# Patient Record
Sex: MALE | Race: WHITE | NOT HISPANIC OR LATINO | Employment: OTHER | URBAN - METROPOLITAN AREA
[De-identification: names, ages, dates, MRNs, and addresses within clinical notes are randomized per-mention and may not be internally consistent; named-entity substitution may affect disease eponyms.]

---

## 2017-04-04 ENCOUNTER — OFFICE VISIT - RIVER FALLS (OUTPATIENT)
Dept: FAMILY MEDICINE | Facility: CLINIC | Age: 72
End: 2017-04-04

## 2017-04-04 ASSESSMENT — MIFFLIN-ST. JEOR: SCORE: 1590.13

## 2017-04-05 ENCOUNTER — OFFICE VISIT - RIVER FALLS (OUTPATIENT)
Dept: FAMILY MEDICINE | Facility: CLINIC | Age: 72
End: 2017-04-05

## 2017-04-05 ASSESSMENT — MIFFLIN-ST. JEOR: SCORE: 1595.57

## 2017-06-15 ENCOUNTER — OFFICE VISIT - RIVER FALLS (OUTPATIENT)
Dept: FAMILY MEDICINE | Facility: CLINIC | Age: 72
End: 2017-06-15

## 2017-06-15 ENCOUNTER — COMMUNICATION - RIVER FALLS (OUTPATIENT)
Dept: FAMILY MEDICINE | Facility: CLINIC | Age: 72
End: 2017-06-15

## 2017-06-15 ASSESSMENT — MIFFLIN-ST. JEOR: SCORE: 1575.61

## 2017-06-16 LAB
CREAT SERPL-MCNC: 1.31 MG/DL (ref 0.7–1.18)
GLUCOSE BLD-MCNC: 104 MG/DL (ref 65–99)

## 2017-08-14 ENCOUNTER — AMBULATORY - RIVER FALLS (OUTPATIENT)
Dept: FAMILY MEDICINE | Facility: CLINIC | Age: 72
End: 2017-08-14

## 2017-08-15 LAB
CREAT SERPL-MCNC: 1.07 MG/DL (ref 0.7–1.18)
GLUCOSE BLD-MCNC: 104 MG/DL (ref 65–99)

## 2017-12-07 ENCOUNTER — OFFICE VISIT - RIVER FALLS (OUTPATIENT)
Dept: FAMILY MEDICINE | Facility: CLINIC | Age: 72
End: 2017-12-07

## 2017-12-07 ASSESSMENT — MIFFLIN-ST. JEOR: SCORE: 1588.31

## 2017-12-08 LAB
CREAT SERPL-MCNC: 1.23 MG/DL (ref 0.7–1.18)
GLUCOSE BLD-MCNC: 91 MG/DL (ref 65–99)
PSA SERPL-MCNC: 2.4 NG/ML

## 2018-03-09 ENCOUNTER — OFFICE VISIT - RIVER FALLS (OUTPATIENT)
Dept: FAMILY MEDICINE | Facility: CLINIC | Age: 73
End: 2018-03-09

## 2018-03-09 ASSESSMENT — MIFFLIN-ST. JEOR: SCORE: 1616.43

## 2018-03-29 ENCOUNTER — OFFICE VISIT - RIVER FALLS (OUTPATIENT)
Dept: FAMILY MEDICINE | Facility: CLINIC | Age: 73
End: 2018-03-29

## 2018-03-29 ASSESSMENT — MIFFLIN-ST. JEOR: SCORE: 1623.69

## 2018-04-18 ENCOUNTER — OFFICE VISIT - RIVER FALLS (OUTPATIENT)
Dept: FAMILY MEDICINE | Facility: CLINIC | Age: 73
End: 2018-04-18

## 2018-04-18 ASSESSMENT — MIFFLIN-ST. JEOR: SCORE: 1616.43

## 2018-04-19 LAB
CREAT SERPL-MCNC: 1.73 MG/DL (ref 0.7–1.18)
GLUCOSE BLD-MCNC: 83 MG/DL (ref 65–99)

## 2018-04-20 ENCOUNTER — OFFICE VISIT - RIVER FALLS (OUTPATIENT)
Dept: FAMILY MEDICINE | Facility: CLINIC | Age: 73
End: 2018-04-20

## 2018-04-20 ASSESSMENT — MIFFLIN-ST. JEOR: SCORE: 1610.99

## 2018-05-22 ENCOUNTER — OFFICE VISIT - RIVER FALLS (OUTPATIENT)
Dept: FAMILY MEDICINE | Facility: CLINIC | Age: 73
End: 2018-05-22

## 2018-05-29 ENCOUNTER — OFFICE VISIT - RIVER FALLS (OUTPATIENT)
Dept: FAMILY MEDICINE | Facility: CLINIC | Age: 73
End: 2018-05-29

## 2018-05-29 ASSESSMENT — MIFFLIN-ST. JEOR: SCORE: 1552.93

## 2018-06-07 ENCOUNTER — AMBULATORY - RIVER FALLS (OUTPATIENT)
Dept: FAMILY MEDICINE | Facility: CLINIC | Age: 73
End: 2018-06-07

## 2018-07-27 ENCOUNTER — OFFICE VISIT - RIVER FALLS (OUTPATIENT)
Dept: FAMILY MEDICINE | Facility: CLINIC | Age: 73
End: 2018-07-27

## 2018-07-27 ASSESSMENT — MIFFLIN-ST. JEOR: SCORE: 1581.96

## 2018-07-28 LAB
CHOLEST SERPL-MCNC: 113 MG/DL
CHOLEST/HDLC SERPL: 2.3 {RATIO}
CREAT SERPL-MCNC: 1.12 MG/DL (ref 0.7–1.18)
GLUCOSE BLD-MCNC: 102 MG/DL (ref 65–99)
HDLC SERPL-MCNC: 49 MG/DL
LDLC SERPL CALC-MCNC: 50 MG/DL
NONHDLC SERPL-MCNC: 64 MG/DL
TRIGL SERPL-MCNC: 65 MG/DL

## 2018-08-02 ENCOUNTER — OFFICE VISIT - RIVER FALLS (OUTPATIENT)
Dept: FAMILY MEDICINE | Facility: CLINIC | Age: 73
End: 2018-08-02

## 2018-09-04 ENCOUNTER — OFFICE VISIT - RIVER FALLS (OUTPATIENT)
Dept: FAMILY MEDICINE | Facility: CLINIC | Age: 73
End: 2018-09-04

## 2018-09-04 ASSESSMENT — MIFFLIN-ST. JEOR: SCORE: 1575.61

## 2018-12-14 ENCOUNTER — OFFICE VISIT - RIVER FALLS (OUTPATIENT)
Dept: FAMILY MEDICINE | Facility: CLINIC | Age: 73
End: 2018-12-14

## 2018-12-14 ASSESSMENT — MIFFLIN-ST. JEOR: SCORE: 1613.71

## 2019-01-03 ENCOUNTER — OFFICE VISIT - RIVER FALLS (OUTPATIENT)
Dept: FAMILY MEDICINE | Facility: CLINIC | Age: 74
End: 2019-01-03

## 2019-01-03 ASSESSMENT — MIFFLIN-ST. JEOR: SCORE: 1620.06

## 2019-01-25 ENCOUNTER — OFFICE VISIT - RIVER FALLS (OUTPATIENT)
Dept: FAMILY MEDICINE | Facility: CLINIC | Age: 74
End: 2019-01-25

## 2019-01-25 ASSESSMENT — MIFFLIN-ST. JEOR: SCORE: 1601.01

## 2019-02-11 ENCOUNTER — OFFICE VISIT - RIVER FALLS (OUTPATIENT)
Dept: FAMILY MEDICINE | Facility: CLINIC | Age: 74
End: 2019-02-11

## 2019-05-21 ENCOUNTER — OFFICE VISIT - RIVER FALLS (OUTPATIENT)
Dept: FAMILY MEDICINE | Facility: CLINIC | Age: 74
End: 2019-05-21

## 2019-06-11 ENCOUNTER — OFFICE VISIT - RIVER FALLS (OUTPATIENT)
Dept: FAMILY MEDICINE | Facility: CLINIC | Age: 74
End: 2019-06-11

## 2019-07-25 ENCOUNTER — OFFICE VISIT - RIVER FALLS (OUTPATIENT)
Dept: FAMILY MEDICINE | Facility: CLINIC | Age: 74
End: 2019-07-25

## 2019-07-25 ASSESSMENT — MIFFLIN-ST. JEOR: SCORE: 1603.73

## 2019-07-26 ENCOUNTER — AMBULATORY - RIVER FALLS (OUTPATIENT)
Dept: FAMILY MEDICINE | Facility: CLINIC | Age: 74
End: 2019-07-26

## 2019-07-26 ENCOUNTER — COMMUNICATION - RIVER FALLS (OUTPATIENT)
Dept: FAMILY MEDICINE | Facility: CLINIC | Age: 74
End: 2019-07-26

## 2019-07-26 LAB
BUN SERPL-MCNC: 27 MG/DL (ref 7–25)
BUN/CREAT RATIO - HISTORICAL: 21 (ref 6–22)
CALCIUM SERPL-MCNC: 9.7 MG/DL (ref 8.6–10.3)
CHLORIDE BLD-SCNC: 107 MMOL/L (ref 98–110)
CHOLEST SERPL-MCNC: 122 MG/DL
CHOLEST/HDLC SERPL: 2.3 {RATIO}
CO2 SERPL-SCNC: 28 MMOL/L (ref 20–32)
CREAT SERPL-MCNC: 1.28 MG/DL (ref 0.7–1.18)
EGFRCR SERPLBLD CKD-EPI 2021: 54 ML/MIN/1.73M2
ERYTHROCYTE [DISTWIDTH] IN BLOOD BY AUTOMATED COUNT: 12.7 % (ref 11–15)
GLUCOSE BLD-MCNC: 105 MG/DL (ref 65–99)
HCT VFR BLD AUTO: 46.4 % (ref 38.5–50)
HDLC SERPL-MCNC: 52 MG/DL
HEMOCCULT STL QL IA: NEGATIVE
HGB BLD-MCNC: 15.1 GM/DL (ref 13.2–17.1)
LDLC SERPL CALC-MCNC: 56 MG/DL
MCH RBC QN AUTO: 28.7 PG (ref 27–33)
MCHC RBC AUTO-ENTMCNC: 32.5 GM/DL (ref 32–36)
MCV RBC AUTO: 88 FL (ref 80–100)
NONHDLC SERPL-MCNC: 70 MG/DL
PLATELET # BLD AUTO: 265 10*3/UL (ref 140–400)
PMV BLD: 11.7 FL (ref 7.5–12.5)
POTASSIUM BLD-SCNC: 4.9 MMOL/L (ref 3.5–5.3)
RBC # BLD AUTO: 5.27 10*6/UL (ref 4.2–5.8)
SODIUM SERPL-SCNC: 142 MMOL/L (ref 135–146)
TRIGL SERPL-MCNC: 65 MG/DL
WBC # BLD AUTO: 7.2 10*3/UL (ref 3.8–10.8)

## 2019-08-29 ENCOUNTER — OFFICE VISIT - RIVER FALLS (OUTPATIENT)
Dept: FAMILY MEDICINE | Facility: CLINIC | Age: 74
End: 2019-08-29

## 2019-10-02 ENCOUNTER — OFFICE VISIT - RIVER FALLS (OUTPATIENT)
Dept: FAMILY MEDICINE | Facility: CLINIC | Age: 74
End: 2019-10-02

## 2019-10-02 ASSESSMENT — MIFFLIN-ST. JEOR: SCORE: 1560.19

## 2019-11-05 ENCOUNTER — OFFICE VISIT - RIVER FALLS (OUTPATIENT)
Dept: FAMILY MEDICINE | Facility: CLINIC | Age: 74
End: 2019-11-05

## 2020-02-04 ENCOUNTER — OFFICE VISIT - RIVER FALLS (OUTPATIENT)
Dept: FAMILY MEDICINE | Facility: CLINIC | Age: 75
End: 2020-02-04

## 2020-04-30 ENCOUNTER — OFFICE VISIT - RIVER FALLS (OUTPATIENT)
Dept: FAMILY MEDICINE | Facility: CLINIC | Age: 75
End: 2020-04-30

## 2020-05-01 ENCOUNTER — OFFICE VISIT - RIVER FALLS (OUTPATIENT)
Dept: FAMILY MEDICINE | Facility: CLINIC | Age: 75
End: 2020-05-01

## 2020-05-01 ASSESSMENT — MIFFLIN-ST. JEOR: SCORE: 1562

## 2020-05-08 ENCOUNTER — AMBULATORY - RIVER FALLS (OUTPATIENT)
Dept: FAMILY MEDICINE | Facility: CLINIC | Age: 75
End: 2020-05-08

## 2020-05-14 ENCOUNTER — AMBULATORY - RIVER FALLS (OUTPATIENT)
Dept: FAMILY MEDICINE | Facility: CLINIC | Age: 75
End: 2020-05-14

## 2020-08-28 ENCOUNTER — OFFICE VISIT - RIVER FALLS (OUTPATIENT)
Dept: FAMILY MEDICINE | Facility: CLINIC | Age: 75
End: 2020-08-28

## 2020-08-28 ENCOUNTER — TRANSFERRED RECORDS (OUTPATIENT)
Dept: MULTI SPECIALTY CLINIC | Facility: CLINIC | Age: 75
End: 2020-08-28

## 2020-08-28 ASSESSMENT — MIFFLIN-ST. JEOR: SCORE: 1566.54

## 2020-08-30 LAB
ANA SER QL IA: NEGATIVE
C REACTIVE PROTEIN LHE: 2.6 MG/L
CYCLIC CITRULLINATED PEPTIDE CCP AB IGG - QUEST: <16
RHEUMATOID FACT SER NEPH-ACNC: <14 IU/ML

## 2020-08-31 ENCOUNTER — COMMUNICATION - RIVER FALLS (OUTPATIENT)
Dept: FAMILY MEDICINE | Facility: CLINIC | Age: 75
End: 2020-08-31

## 2020-09-28 ENCOUNTER — OFFICE VISIT - RIVER FALLS (OUTPATIENT)
Dept: FAMILY MEDICINE | Facility: CLINIC | Age: 75
End: 2020-09-28

## 2020-09-28 ASSESSMENT — MIFFLIN-ST. JEOR: SCORE: 1571.07

## 2020-10-30 ENCOUNTER — OFFICE VISIT - RIVER FALLS (OUTPATIENT)
Dept: FAMILY MEDICINE | Facility: CLINIC | Age: 75
End: 2020-10-30

## 2020-10-30 ASSESSMENT — MIFFLIN-ST. JEOR: SCORE: 1562

## 2020-11-17 ENCOUNTER — OFFICE VISIT - RIVER FALLS (OUTPATIENT)
Dept: FAMILY MEDICINE | Facility: CLINIC | Age: 75
End: 2020-11-17

## 2020-11-18 ENCOUNTER — COMMUNICATION - RIVER FALLS (OUTPATIENT)
Dept: FAMILY MEDICINE | Facility: CLINIC | Age: 75
End: 2020-11-18

## 2020-11-27 ENCOUNTER — COMMUNICATION - RIVER FALLS (OUTPATIENT)
Dept: FAMILY MEDICINE | Facility: CLINIC | Age: 75
End: 2020-11-27

## 2021-03-09 ENCOUNTER — OFFICE VISIT - RIVER FALLS (OUTPATIENT)
Dept: FAMILY MEDICINE | Facility: CLINIC | Age: 76
End: 2021-03-09

## 2021-06-29 ENCOUNTER — OFFICE VISIT - RIVER FALLS (OUTPATIENT)
Dept: FAMILY MEDICINE | Facility: CLINIC | Age: 76
End: 2021-06-29

## 2021-06-29 ASSESSMENT — MIFFLIN-ST. JEOR: SCORE: 1567.68

## 2021-08-24 ENCOUNTER — OFFICE VISIT - RIVER FALLS (OUTPATIENT)
Dept: FAMILY MEDICINE | Facility: CLINIC | Age: 76
End: 2021-08-24

## 2021-12-07 ENCOUNTER — AMBULATORY - RIVER FALLS (OUTPATIENT)
Dept: FAMILY MEDICINE | Facility: CLINIC | Age: 76
End: 2021-12-07

## 2021-12-21 ENCOUNTER — OFFICE VISIT - RIVER FALLS (OUTPATIENT)
Dept: FAMILY MEDICINE | Facility: CLINIC | Age: 76
End: 2021-12-21

## 2021-12-21 ASSESSMENT — MIFFLIN-ST. JEOR: SCORE: 1560.42

## 2021-12-22 ENCOUNTER — COMMUNICATION - RIVER FALLS (OUTPATIENT)
Dept: FAMILY MEDICINE | Facility: CLINIC | Age: 76
End: 2021-12-22

## 2021-12-22 LAB
BUN SERPL-MCNC: 32 MG/DL (ref 7–25)
BUN/CREAT RATIO - HISTORICAL: 27 (ref 6–22)
CALCIUM SERPL-MCNC: 9.5 MG/DL (ref 8.6–10.3)
CHLORIDE BLD-SCNC: 105 MMOL/L (ref 98–110)
CHOLEST SERPL-MCNC: 159 MG/DL
CHOLEST/HDLC SERPL: 3.1 {RATIO}
CO2 SERPL-SCNC: 26 MMOL/L (ref 20–32)
CREAT SERPL-MCNC: 1.17 MG/DL (ref 0.7–1.18)
EGFRCR SERPLBLD CKD-EPI 2021: 59 ML/MIN/1.73M2
ERYTHROCYTE [DISTWIDTH] IN BLOOD BY AUTOMATED COUNT: 12.3 % (ref 11–15)
GLUCOSE BLD-MCNC: 103 MG/DL (ref 65–99)
HCT VFR BLD AUTO: 46 % (ref 38.5–50)
HDLC SERPL-MCNC: 52 MG/DL
HGB BLD-MCNC: 15.3 GM/DL (ref 13.2–17.1)
LDLC SERPL CALC-MCNC: 92 MG/DL
MCH RBC QN AUTO: 28.6 PG (ref 27–33)
MCHC RBC AUTO-ENTMCNC: 33.3 GM/DL (ref 32–36)
MCV RBC AUTO: 86 FL (ref 80–100)
NONHDLC SERPL-MCNC: 107 MG/DL
PLATELET # BLD AUTO: 283 10*3/UL (ref 140–400)
PMV BLD: 11.3 FL (ref 7.5–12.5)
POTASSIUM BLD-SCNC: 4.5 MMOL/L (ref 3.5–5.3)
PSA SERPL-MCNC: 1.94 NG/ML
RBC # BLD AUTO: 5.35 10*6/UL (ref 4.2–5.8)
SODIUM SERPL-SCNC: 140 MMOL/L (ref 135–146)
TRIGL SERPL-MCNC: 64 MG/DL
WBC # BLD AUTO: 7.7 10*3/UL (ref 3.8–10.8)

## 2022-02-12 VITALS
OXYGEN SATURATION: 98 % | WEIGHT: 194 LBS | WEIGHT: 195 LBS | DIASTOLIC BLOOD PRESSURE: 82 MMHG | SYSTOLIC BLOOD PRESSURE: 126 MMHG | TEMPERATURE: 97.1 F | SYSTOLIC BLOOD PRESSURE: 138 MMHG | BODY MASS INDEX: 29.4 KG/M2 | OXYGEN SATURATION: 95 % | HEIGHT: 68 IN | SYSTOLIC BLOOD PRESSURE: 120 MMHG | HEART RATE: 56 BPM | WEIGHT: 193 LBS | HEART RATE: 61 BPM | BODY MASS INDEX: 29.25 KG/M2 | TEMPERATURE: 99.1 F | HEART RATE: 60 BPM | DIASTOLIC BLOOD PRESSURE: 72 MMHG | HEIGHT: 68 IN | BODY MASS INDEX: 29.55 KG/M2 | DIASTOLIC BLOOD PRESSURE: 80 MMHG | HEIGHT: 68 IN

## 2022-02-12 VITALS
BODY MASS INDEX: 30.71 KG/M2 | DIASTOLIC BLOOD PRESSURE: 85 MMHG | SYSTOLIC BLOOD PRESSURE: 135 MMHG | WEIGHT: 199 LBS | OXYGEN SATURATION: 98 % | HEART RATE: 120 BPM | TEMPERATURE: 98.6 F

## 2022-02-12 VITALS
BODY MASS INDEX: 30.19 KG/M2 | HEART RATE: 72 BPM | DIASTOLIC BLOOD PRESSURE: 88 MMHG | SYSTOLIC BLOOD PRESSURE: 138 MMHG | TEMPERATURE: 97.6 F | WEIGHT: 200.4 LBS | TEMPERATURE: 98.8 F | WEIGHT: 199.2 LBS | SYSTOLIC BLOOD PRESSURE: 134 MMHG | HEIGHT: 68 IN | HEART RATE: 84 BPM | BODY MASS INDEX: 30.37 KG/M2 | HEIGHT: 68 IN | DIASTOLIC BLOOD PRESSURE: 86 MMHG

## 2022-02-12 VITALS
SYSTOLIC BLOOD PRESSURE: 134 MMHG | HEART RATE: 60 BPM | HEIGHT: 68 IN | BODY MASS INDEX: 29.92 KG/M2 | WEIGHT: 197.4 LBS | DIASTOLIC BLOOD PRESSURE: 78 MMHG | TEMPERATURE: 97.7 F

## 2022-02-12 VITALS
BODY MASS INDEX: 29.7 KG/M2 | SYSTOLIC BLOOD PRESSURE: 132 MMHG | HEIGHT: 68 IN | TEMPERATURE: 97.9 F | HEART RATE: 64 BPM | WEIGHT: 196 LBS | DIASTOLIC BLOOD PRESSURE: 78 MMHG

## 2022-02-12 VITALS
BODY MASS INDEX: 30.98 KG/M2 | HEIGHT: 68 IN | SYSTOLIC BLOOD PRESSURE: 140 MMHG | HEART RATE: 64 BPM | DIASTOLIC BLOOD PRESSURE: 78 MMHG | WEIGHT: 204.4 LBS | DIASTOLIC BLOOD PRESSURE: 80 MMHG | HEIGHT: 68 IN | BODY MASS INDEX: 31.19 KG/M2 | SYSTOLIC BLOOD PRESSURE: 140 MMHG | SYSTOLIC BLOOD PRESSURE: 136 MMHG | HEART RATE: 60 BPM | DIASTOLIC BLOOD PRESSURE: 78 MMHG | WEIGHT: 201.6 LBS | HEART RATE: 60 BPM | BODY MASS INDEX: 30.55 KG/M2 | HEIGHT: 68 IN | WEIGHT: 205.8 LBS

## 2022-02-12 VITALS
DIASTOLIC BLOOD PRESSURE: 80 MMHG | BODY MASS INDEX: 29.78 KG/M2 | HEART RATE: 58 BPM | TEMPERATURE: 97.9 F | DIASTOLIC BLOOD PRESSURE: 82 MMHG | HEIGHT: 68 IN | SYSTOLIC BLOOD PRESSURE: 158 MMHG | HEART RATE: 80 BPM | BODY MASS INDEX: 29.25 KG/M2 | HEIGHT: 68 IN | DIASTOLIC BLOOD PRESSURE: 78 MMHG | HEIGHT: 68 IN | SYSTOLIC BLOOD PRESSURE: 136 MMHG | HEART RATE: 54 BPM | WEIGHT: 193 LBS | BODY MASS INDEX: 29.78 KG/M2 | SYSTOLIC BLOOD PRESSURE: 142 MMHG

## 2022-02-12 VITALS
DIASTOLIC BLOOD PRESSURE: 70 MMHG | HEIGHT: 67 IN | WEIGHT: 194.4 LBS | TEMPERATURE: 98.2 F | SYSTOLIC BLOOD PRESSURE: 135 MMHG | BODY MASS INDEX: 30.51 KG/M2 | HEART RATE: 76 BPM

## 2022-02-12 VITALS
DIASTOLIC BLOOD PRESSURE: 82 MMHG | TEMPERATURE: 98 F | BODY MASS INDEX: 29.7 KG/M2 | HEART RATE: 92 BPM | HEIGHT: 68 IN | WEIGHT: 196 LBS | SYSTOLIC BLOOD PRESSURE: 130 MMHG

## 2022-02-12 VITALS
BODY MASS INDEX: 30.13 KG/M2 | TEMPERATURE: 98.2 F | HEART RATE: 88 BPM | WEIGHT: 198.8 LBS | DIASTOLIC BLOOD PRESSURE: 82 MMHG | SYSTOLIC BLOOD PRESSURE: 138 MMHG | HEIGHT: 68 IN

## 2022-02-12 VITALS
BODY MASS INDEX: 28.95 KG/M2 | BODY MASS INDEX: 31.07 KG/M2 | WEIGHT: 191 LBS | SYSTOLIC BLOOD PRESSURE: 136 MMHG | BODY MASS INDEX: 30.89 KG/M2 | TEMPERATURE: 98.1 F | SYSTOLIC BLOOD PRESSURE: 130 MMHG | DIASTOLIC BLOOD PRESSURE: 72 MMHG | TEMPERATURE: 98.4 F | DIASTOLIC BLOOD PRESSURE: 85 MMHG | HEIGHT: 68 IN | HEIGHT: 68 IN | HEART RATE: 64 BPM | DIASTOLIC BLOOD PRESSURE: 80 MMHG | HEIGHT: 68 IN | WEIGHT: 203.8 LBS | HEIGHT: 68 IN | SYSTOLIC BLOOD PRESSURE: 112 MMHG | HEIGHT: 68 IN | BODY MASS INDEX: 31.31 KG/M2 | DIASTOLIC BLOOD PRESSURE: 60 MMHG | HEART RATE: 76 BPM | TEMPERATURE: 98.5 F | HEART RATE: 79 BPM | WEIGHT: 205 LBS | SYSTOLIC BLOOD PRESSURE: 130 MMHG | WEIGHT: 205 LBS | SYSTOLIC BLOOD PRESSURE: 114 MMHG | TEMPERATURE: 98.1 F | HEART RATE: 92 BPM | DIASTOLIC BLOOD PRESSURE: 68 MMHG | HEART RATE: 80 BPM | WEIGHT: 206.6 LBS | BODY MASS INDEX: 31.07 KG/M2

## 2022-02-12 VITALS
TEMPERATURE: 97.9 F | DIASTOLIC BLOOD PRESSURE: 74 MMHG | BODY MASS INDEX: 30.76 KG/M2 | WEIGHT: 196 LBS | SYSTOLIC BLOOD PRESSURE: 146 MMHG | HEART RATE: 64 BPM | HEIGHT: 67 IN

## 2022-02-12 VITALS
BODY MASS INDEX: 29.19 KG/M2 | WEIGHT: 192.6 LBS | DIASTOLIC BLOOD PRESSURE: 72 MMHG | HEIGHT: 68 IN | SYSTOLIC BLOOD PRESSURE: 116 MMHG | OXYGEN SATURATION: 98 % | HEART RATE: 95 BPM

## 2022-02-12 VITALS
DIASTOLIC BLOOD PRESSURE: 62 MMHG | WEIGHT: 208.8 LBS | TEMPERATURE: 97.6 F | OXYGEN SATURATION: 98 % | HEART RATE: 61 BPM | SYSTOLIC BLOOD PRESSURE: 124 MMHG | BODY MASS INDEX: 32.22 KG/M2

## 2022-02-12 VITALS
TEMPERATURE: 98.6 F | DIASTOLIC BLOOD PRESSURE: 80 MMHG | WEIGHT: 202.2 LBS | SYSTOLIC BLOOD PRESSURE: 132 MMHG | BODY MASS INDEX: 30.65 KG/M2 | HEART RATE: 80 BPM | HEIGHT: 68 IN

## 2022-02-15 ENCOUNTER — OFFICE VISIT - RIVER FALLS (OUTPATIENT)
Dept: FAMILY MEDICINE | Facility: CLINIC | Age: 77
End: 2022-02-15
Payer: COMMERCIAL

## 2022-02-16 NOTE — LETTER
(Inserted Image. Unable to display)   [address] [place]  [address]  [phone]    July 26, 2019      ZHANE HOLBROOK      [address] [place], [place] [id number]        Dear ZHANE,    Thank you for selecting [facility] for your healthcare needs. Below you will find the result of your recent test(s) done at our clinic.     This was negative. We can discontinue colon screenings unless you have symptoms.      Result Name Current Result   FIT Fecal Occult Blood  Negative 7/26/2019       Please contact my practice at [phone] if you have any questions or concerns.      Sincerely,        Mike Strong MD ,   Nydia Fofana MD,   Rizwan Noe PA-C

## 2022-02-16 NOTE — PROGRESS NOTES
Chief Complaint    Patient presents with CORBY Dx had sleep study in 2014, no machine, and referral from [facility].  History of Present Illness      Patient is here with his wife.  He was diagnosed with severe sleep apnea in 2014 but did not follow-up or have it treated ever.  He snores heroically and has witnessed apneas.  No sleepwalking.  He is retired.  Has profound daytime sleepiness.  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  He has been feeling the orthopnea or edema.  No chest pain.  Denies heartburn or headaches but does have nocturia.  Review of Systems      No recent weight gain.  No symptoms of hypothyroidism.  Physical Exam   Vitals & Measurements    T: 98.5(Tympanic)  HR: 80(Peripheral)  BP: 130/60     HT: 67.5 in  WT: 203.8 lb  BMI: 31.45       Patient is an overweight older gentleman in no distress.  He appears comfortable.  Alert and oriented.  HEENT exam is Mallampati 3.  Neck is without adenopathy or thyromegaly.  Chest is clear.  Cardiac exam is regular no murmur.  No edema.  Cranial nerves normal.  Assessment/Plan       Nonischemic cardiomyopathy (I42.8)         Stable.         Orders:          09328 office outpatient new 30 minutes (Charge), Quantity: 1, CORBY (Obstructive Sleep Apnea)  Nonischemic cardiomyopathy  Obese          Referral (Request), 04/20/18 14:36:00 CDT, Referred to: Other, Referred to: Sleep Center, Reason for referral: Polysomnogram for CPAP titration in a patient with severe CORBY and recent nonischemic cardiomyopathy due to myocarditis, CORBY (Obstructive Sleep Apnea)  Obese ...                Obese (E66.9)         Weight loss is encouraged.         Orders:          00353 office outpatient new 30 minutes (Charge), Quantity: 1, CORBY (Obstructive Sleep Apnea)  Nonischemic cardiomyopathy  Obese          Referral (Request), 04/20/18 14:36:00 CDT, Referred to: Other, Referred to: Sleep Center, Reason for referral:  Polysomnogram for CPAP titration in a patient with severe CORBY and recent nonischemic cardiomyopathy due to myocarditis, CORBY (Obstructive Sleep Apnea)  Obese ...                CORBY (Obstructive Sleep Apnea) (G47.33)         Patient needs an in lab CPAP titration study.  2014 polysomnogram reveals an EKG         Orders:          [id number] office outpatient new 30 minutes (Charge), Quantity: 1, CORBY (Obstructive Sleep Apnea)  Nonischemic cardiomyopathy  Obese          Referral (Request), 04/20/18 14:36:00 CDT, Referred to: Other, Referred to: Sleep Center, Reason for referral: Polysomnogram for CPAP titration in a patient with severe CORBY and recent nonischemic cardiomyopathy due to myocarditis, CORBY (Obstructive Sleep Apnea)  Obese ...           Patient Information     Name:ZHANE HOLBROOK      Address:      [address] [address]     Sex:Male     YOB: 1945     [phone]     MRN:[id number]     FIN:[id number]     Location:[facility]     Date of Service:04/20/2018      Primary Care Physician:       Rios Strong MD, [phone]      Attending Physician:       Jose Ham MD, [phone]  Problem List/Past Medical History    Ongoing     Benign Essential Hypertension     Chronic low back pain     Colon Adenomas     Nonischemic cardiomyopathy     Obese     CORBY (Obstructive Sleep Apnea)       Comments: On 4/30/14 severe CORBY with AHI 81 ans oximetry naidir 64%. Optimal PAP presure not found.    Historical     Inpatient stay       Comments: [facility] - Chest pain.  Procedure/Surgical History     Facetectomy of vertebra (07/10/2017)       Comments: Right L3-4.     Phacoemulsification (02/12/2015)       Comments: Left.     Phacoemulsification (01/30/2015)       Comments: Right.     Colonoscopy (10/23/2013)       Comments: Pedunculated polyp 25 cm, snared, not retrieved. Severe left-sided diverticulosis. Repeat 5 years.     Colonoscopy  (10/22/2008)       Comments: 1 adenoma., left-sided diverticulosis, rectal AVM. Repeat 5 years.     Colonoscopy (2002)     Lower Back Surgery  Medications        aspirin 81 mg oral tablet: 81 mg, 1 tab(s), po, daily, Per [facility] 4/12/2018, 0 Refill(s).        latanoprost 0.005% ophthalmic solution: 1 drop(s), both eyes, qpm, 0 Refill(s).        metoprolol succinate 100 mg oral tablet, extended release: 100 mg, 1 tab(s), po, daily, Per [facility] 4/12/2018, 90 tab(s), 3 Refill(s).        atorvastatin 40 mg oral tablet: 40 mg, 1 tab(s), po, daily, Per [facility] 4/12/2018, 90 tab(s), 3 Refill(s).        lisinopril 10 mg oral tablet: 10 mg, 1 tab(s), PO, Daily, 90 tab(s), 3 Refill(s).        meclizine 25 mg oral tablet: 25 mg, 1 tab(s), po, tid, PRN: as needed for dizziness, 30 tab(s), 2 Refill(s).                Allergies    No Known Medication Allergies  Social History    Smoking Status - 04/20/2018     Former smoker     Alcohol - Past, 02/15/2012      Past, Beer (12 oz), 02/15/2012     Employment and Education      Retired, Work/School description: [employer] 06/16/2017     Exercise and Physical Activity      Exercise frequency: Never., 02/15/2012     Home and Environment      Marital status: . Spouse/Partner name: Josey. Lives with Spouse. 2 children. Living situation: Home/Independent. Smoker in household: No., 02/15/2012     Nutrition and Health      Type of diet: Regular., 02/15/2012     Sexual      Sexually active: No. Sexual orientation: Heterosexual., 11/18/2014     Substance Abuse - Denies Substance Abuse, 11/18/2014      Never, 11/18/2014     Tobacco - Past, 03/28/2013      Past, Pipe, Started age 12 Years. Stopped age 43 Years., 02/15/2012  Family History    Cancer: Brother.  Immunizations      Vaccine Date Status      ZOS, shingles 09/28/2015 Recorded      pneumococcal (PCV13) 08/18/2015 Given      influenza virus vaccine, inactivated 09/25/2009 Recorded      Td 05/06/2005 Recorded  Lab Results           Lab Results (Last 4 results within 90 days)           Sodium Level: 137 mmol/L [135 mmol/L - 146 mmol/L] (04/18/18 15:56:00)          Potassium Level: 4.4 mmol/L [3.5 mmol/L - 5.3 mmol/L] (04/18/18 15:56:00)          Potassium Level TR: 4 mEq/L [6  - 22] (04/12/18 09:36:00)          Chloride Level: 102 mmol/L [98 mmol/L - 110 mmol/L] (04/18/18 15:56:00)          CO2 Level: 28 mmol/L [20 mmol/L - 31 mmol/L] (04/18/18 15:56:00)          Glucose Level: 83 mg/dL [65 mg/dL - 99 mg/dL] (04/18/18 15:56:00)          BUN: 28 mg/dL High [7 mg/dL - 25 mg/dL] (04/18/18 15:56:00)          Creatinine Level: 1.73 mg/dL High [0.7 mg/dL - 1.18 mg/dL] (04/18/18 15:56:00)          BUN/Creat Ratio: 16 [6  - 22] (04/18/18 15:56:00)          eGFR: 38 mL/min/1.73m2 Low [8.6 mg/dL - 10.3 mg/dL] (04/18/18 15:56:00)          eGFR : 44 mL/min/1.73m2 Low [6  - 22] (04/18/18 15:56:00)          Calcium Level: 9.5 mg/dL [8.6 mg/dL - 10.3 mg/dL] (04/18/18 15:56:00)          Magnesium Level TR: 2.1 mg/dL [6  - 22] (04/12/18 09:36:00)          B-Natriuretic Peptide (BNP): 123 pg/mL High [6  - 22] (04/18/18 15:56:00)          Hgb TR: 14.5 g/dL [6  - 22] (04/12/18 09:36:00)          MCV TR: 87 fL [6  - 22] (04/12/18 09:36:00)          Platelet TR: 227 x10^3/uL [6  - 22] (04/12/18 09:36:00)          MPV (Mean Platelet Volume) TR: 11.1 fL [20 mmol/L - 31 mmol/L] (04/12/18 09:36:00)  Diagnostic Results   2014 polysomnogram reveals an AHI of 81 and oximetry erica of 64%.  Pap titration was unsuccessful.  His nose gets so damn long-term today

## 2022-02-16 NOTE — TELEPHONE ENCOUNTER
"---------------------  From: Karlie Tovar CMA (Phone Messages Pool ([id number]_[facility]))   To: Phone Messages Pool ([id number]_[place]);     Sent: 11/27/2020 10:26:30 AM CST  Subject: Phone Message     Phone Message    PCP:   CHT      Time of Call:  0940       Person Calling:  Pt  Phone number:  [phone]    Returned call at: 1015    Note:   Calls for refill of Lisinopril 20mg because the dose was doubled per patient. Checked med list instructions 1 tab daily #90 3 refill sent 8/28/20/ Called [facility] patient has refills available. Called patient left a message for return call to see how many Lisinopril he's taking and let him know he still has refills at the pharmacy.     Last office visit and reason:  10/30/20 CORBY JDL---------------------  From: Silvia Saenz RN (Phone Messages Pool ([id number]_[facility]))   To: [facility] Message Pool (10124_[facility]);     Sent: 11/27/2020 1:04:34 PM CST  Subject: FW: Phone Message     Pt LVM at 1236 stating \"just have Dr. Strong call the pharmacy and fit this with the heart pills because I'm almost out.\"    Call to pt at 1255    Pt state [facility] or [facility] told him \"a few months ago\" to take 2 tabs of Lisinopril daily, so he has.  I looked through charting from August to now and do not see any mention of pt being told to increase dose.    Pt has been taking 40 mg of Lisinopril daily and denies any ill effects.    He currently has 15 pills left (7 days of med at 40 mg daily).  Please advise what dose of med pt is to take.    Pt wants to know be end of day.    OK to LVM on home phone: [phone]---------------------  From: Kailey Quijano CMA ([facility] Message Pool ([id number]_[facility]))   To: Mike Strong MD;     Sent: 11/27/2020 1:11:49 PM CST  Subject: FW: Phone Message  ** Submitted: **  Order:lisinopril (lisinopril 20 mg oral tablet)  2 tab(s)  Oral  daily  Qty:  180 tab(s)        Duration:  90 day(s)        Refills:  3          Substitutions Allowed     " Route To Pharmacy - [facility]    Signed by Mike Strong MD  11/27/2020 8:43:00 PM [facility]---------------------  From: Mike Strong MD   To: [facility] Message Pool ([id number]_[facility]);     Sent: 11/27/2020 2:43:41 PM CST  Subject: RE: Phone Message     KARLEE on identified vm

## 2022-02-16 NOTE — LETTER
(Inserted Image. Unable to display)           [address] [place] [address] [phone]    December 22, 2021      ZHANE YUSEF      [address] [address]        Dear ZHANE,    Thank you for selecting [facility] for your healthcare needs. Below you will find the result of your recent test(s) done at our clinic.     These are stable. Please keep regular visits at the clinic.      Result Name Current Result Previous Result Reference Range   Cholesterol (mg/dL)  159 12/21/2021  105 8/28/2020  - <200   HDL (mg/dL)  52 12/21/2021  49 8/28/2020 > OR = 40 -    Triglyceride (mg/dL)  64 12/21/2021  52 8/28/2020  - <150   LDL  92 12/21/2021  43 8/28/2020    Cholesterol/HDL Ratio  3.1 12/21/2021  2.1 8/28/2020  - <5.0   Non-HDL Cholesterol  107 12/21/2021  56 8/28/2020  - <130   Glucose Level (mg/dL) ((H)) 103 12/21/2021 ((H)) 106 8/28/2020 65 - 99   BUN (mg/dL) ((H)) 32 12/21/2021  20 8/28/2020 7 - 25   Creatinine Level (mg/dL)  1.17 12/21/2021 ((H)) 1.22 8/28/2020 0.70 - 1.18   Sodium Level (mmol/L)  140 12/21/2021  142 8/28/2020 135 - 146   Potassium Level (mmol/L)  4.5 12/21/2021  4.7 8/28/2020 3.5 - 5.3   Chloride Level (mmol/L)  105 12/21/2021  108 8/28/2020 98 - 110   CO2 Level (mmol/L)  26 12/21/2021  27 8/28/2020 20 - 32   Calcium Level (mg/dL)  9.5 12/21/2021  9.3 8/28/2020 8.6 - 10.3   WBC  7.7 12/21/2021  7.8 8/28/2020 3.8 - 10.8   RBC  5.35 12/21/2021  4.93 8/28/2020 4.20 - 5.80   Hgb (gm/dL)  15.3 12/21/2021  14.4 8/28/2020 13.2 - 17.1   Hct (%)  46.0 12/21/2021  42.6 8/28/2020 38.5 - 50.0   MCV (fL)  86.0 12/21/2021  86.4 8/28/2020 80.0 - 100.0   MCH (pg)  28.6 12/21/2021  29.2 8/28/2020 27.0 - 33.0   MCHC (gm/dL)  33.3 12/21/2021  33.8 8/28/2020 32.0 - 36.0   RDW (%)  12.3 12/21/2021  12.4 8/28/2020 11.0 - 15.0   Platelet  283 12/21/2021  266 8/28/2020 140 - 400   MPV (fL)  11.3 12/21/2021  11.2 8/28/2020 7.5 - 12.5   PSA (ng/mL)  1.94  12/21/2021   - < OR = 4.00       Please contact my practice at [phone] if you have any questions or concerns.      Sincerely,        Mike Strong MD ,   Nydia Fofana MD,   Rizwan Noe PA-C                  What do you labs mean?  Below is a glossary of commonly ordered labs:  LDL - Bad Cholesterol HDL - Good Cholesterol  AST/ALT - Liver Function Cr/creatinine - Kidney Function  Microalbumin - Kidney Function  TSH - Thyroid Function PSA- Prostate  Hgb - iron stores/blood count Hgb A1c - Diabetic control

## 2022-02-16 NOTE — PROGRESS NOTES
Patient:   ZHANE HOLBROOK            MRN: [id number]            FIN: [id number]               Age:   75 years     Sex:  Male     :  1945   Associated Diagnoses:   Bilateral hip pain; Pain in left hip   Author:   Mike Strong MD      Chief Complaint   2020 8:49 AM CDT    Pt c/o hip pain. Asking for hip XR.   Chief complaint and symptoms noted above confirmed with patient.      History of Present Illness             The patient presents with hip pain.  The hip pain is located bilaterally.  The hip pain is described as aching.  The severity of the hip pain is moderate.  The hip pain has lasted for 12 month(s).  Radiation of pain to the back.  Has DJD.  Exacerbating factors consist of movement, prolonged sitting and walking.  Relieving factors consist of analgesics.  Associated symptoms consist of none.        Review of Systems   Constitutional:  Negative.       Health Status   Allergies:    Allergic Reactions (Selected)  No Known Medication Allergies   Medications:  (Selected)   Prescriptions  Prescribed  CPAP +7 cm H2o: CPAP +7 cm H2o, See Instructions, Instructions: Heated humidifier, heated tubing, filters, nasal or full face mask of choice with headgear.  Replacement cushions and supplies as needed.  Months = 99 (Lifetime), Supply, # 1 EA, 0 Refill(s), Type: Maint...  Metoprolol Succinate  mg oral tablet, extended release: = 1 tab(s) ( 100 mg ), Oral, daily, # 90 tab(s), 3 Refill(s), Type: Maintenance, Pharmacy: [facility], 1 tab(s) Oral daily, 67.5, in, 20 8:39:00 CDT, Height Measured, 194, lb, 20...  atorvastatin 40 mg oral tablet: = 1 tab(s) ( 40 mg ), Oral, daily, # 90 tab(s), 3 Refill(s), Type: Maintenance, Pharmacy: [facility], 1 tab(s) Oral daily, 67.5, in, 20 8:39:00 CDT, Height Measured, 194, lb, 20 8...  lisinopril 20 mg oral tablet: = 1  tab(s) ( 20 mg ), Oral, daily, # 90 tab(s), 3 Refill(s), Type: Maintenance, Pharmacy: [facility], 1 tab(s) Oral daily, 67.5, in, 20 8:39:00 CDT, Height Measured, 194, lb, 20 8...  meclizine 25 mg oral tablet: = 1 tab(s) ( 25 mg ), po, tid, PRN: as needed for dizziness, # 270 tab(s), 3 Refill(s), Type: Maintenance, Pharmacy: [facility], 1 tab(s) Oral tid,PRN:as needed for dizziness, 67.5, in, ...   Problem list:    All Problems (Selected)  CORBY (Obstructive Sleep Apnea) / SNOMED CT 0597983261 / Confirmed  Nonischemic cardiomyopathy / SNOMED CT 666226415 / Confirmed  Colon adenomas / SNOMED CT 9941275192 / Confirmed  Obesity / SNOMED CT 0619564765 / Probable  Benign Essential Hypertension / SNOMED CT 7317879 / Confirmed  Chronic low back pain / SNOMED CT 101219090 / Confirmed      Histories   Past Medical History:    Active  CORBY (Obstructive Sleep Apnea) (SNOMED CT 1353889028): Onset on 2014 at 69 years.  Comments:  2018 CDT 11:46 AM Jose Rivero MD  On 14 severe CORBY with AHI 81 ans oximetry naidir 64%. Optimal PAP presure not found.    2018 CDT 8:59 AM Jose Rivero MD  On 18 CPAP titration to 7.  Colon adenomas (SNOMED CT 9234952637)  Benign Essential Hypertension (SNOMED CT 4831731)  Chronic low back pain (SNOMED CT 998740205)  Nonischemic cardiomyopathy (SNOMED CT 444360133)  Resolved  Inpatient stay (SNOMED CT 532802312): Onset on 2018 at 73 years.  Resolved on 2019 at 73 years.  Comments:  1/3/2019 CST 10:04 AM CST - Agnes Barth  @[facility], [place] - Left knee hemiarthroplasty  Inpatient stay (SNOMED CT 080559864): Onset on 2018 at 73 years.  Resolved on 2018 at 73 years.  Comments:  2018 CDT 8:24 AM CDT - Ingacia Potter  [facility] - Chest pain.   Family History:    Cancer  Brother ()      Procedure history:    Arthroplasty (SNOMED CT 9046910492) performed by Leon Florentino MD on 12/31/2018 at 73 Years.  Comments:  1/3/2019 10:07 AM GOPAL - Agnes Barth  left knee hemiarthroplasty  Facetectomy of vertebra (SNOMED CT 7747107) performed by Efrain Ramos MD on 7/10/2017 at 72 Years.  Comments:  8/30/2017 2:23 PM CDT - Ignacia Potter  Right L3-4.  Phacoemulsification (SNOMED CT 8669162374) performed by Edmond Munguia MD on 2/12/2015 at 69 Years.  Comments:  2/17/2015 7:56 AM CST - Ignacia Potter  Left.  Phacoemulsification (SNOMED CT 9394088161) performed by Edmond Munguia MD on 1/30/2015 at 69 Years.  Comments:  2/5/2015 9:51 AM GOPAL - Ignacia Potter  Right.  Colonoscopy (SNOMED CT 955895903) performed by Jose Ham MD on 10/23/2013 at 68 Years.  Comments:  10/23/2013 10:42 AM CDT - Jose Ham MD  Pedunculated polyp 25 cm, snared, not retrieved. Severe left-sided diverticulosis. Repeat 5 years.  Colonoscopy (SNOMED CT 199091550) performed by Jose Ham MD on 10/22/2008 at 63 Years.  Comments:  10/23/2013 10:41 AM LEONIDAS - Jose Ham MD  1 adenoma., left-sided diverticulosis, rectal AVM. Repeat 5 years.  Colonoscopy (SNOMED CT 785530763) in 2002 at 57 Years.  Lower Back Surgery.      Physical Examination   Vital Signs   9/28/2020 8:49 AM CDT Temperature Tympanic 97.1 DegF  LOW    Peripheral Pulse Rate 60 bpm    Systolic Blood Pressure 126 mmHg    Diastolic Blood Pressure 80 mmHg    Mean Arterial Pressure 95 mmHg      Measurements from flowsheet : Measurements   9/28/2020 8:49 AM CDT Height Measured - Standard 67.5 in    Weight Measured - Standard 195 lb    BSA 2.05 m2    Body Mass Index 30.09 kg/m2  HI      General:  Alert and oriented.    Respiratory:  Lungs are clear to auscultation, Respirations are non-labored, Breath sounds are equal.    Cardiovascular:  Normal rate, Regular rhythm.    Gastrointestinal:  Soft, Non-tender.    Musculoskeletal:       Lower extremity exam: Hip (  Bilateral, Tenderness, Pain, Range of motion ( Restricted by pain ) ).    Integumentary:  Warm, Dry, Pink.       Review / Management   Radiology results   X-ray, Both hips DJD      Impression and Plan   Diagnosis     Bilateral hip pain (KYV26-II M25.551).     Pain in left hip (QKG09-VE M25.552).     Course:  Worsening.    Orders     Orders   Pharmacy:  [facility] Arthritis Pain 1% topical gel (Prescribe): ( 2 gm ), Topical, qid, # 240 gm, 5 Refill(s), Type: Maintenance, Pharmacy: [facility] [employer] [facility], 2 gm Topical qid, 67.5, in, 9/28/2020 8:49 AM CDT, Height Measured, 195, lb, 9/28/2020 8:49 AM CDT, Weight Measured.     OK to use OTC Tylenol.        Professional Services   Counseling Summary:  This was a 25 minute visit with greater than 50% of that time spent counseling the patient, and coordination of care..    Counseling included differential diagnoses, treatment options, and risks and benefits.    The patient was interactive, attentive, asked questions, and verbalized understanding.

## 2022-02-16 NOTE — TELEPHONE ENCOUNTER
---------------------  From: Jose Ham MD   To: Sleep Message Pool ([id number]_[place] - [place]);     Sent: 10/30/2020 4:12:30 PM CDT  Subject: General Message     Compliance report in one month.reminder set

## 2022-02-16 NOTE — TELEPHONE ENCOUNTER
---------------------  From: Keerthi Nguyễn (Phone Messages Pool ([id number]_[place]  [facility])   To: Mike Strong MD;     Sent: 8/27/2019 9:17:28 AM CDT  Subject: Lisinopril      Phone Message    PCP: T    Phone Number: _      Note: Patient called asking for a refill of lisinopril. Wondering if [facility] would fill this.    Per note on 7/24/19, patient was notified that he will be getting a refill for 30 days and in order to get another refill he needs to be seen.    Please Advise on refill.     Pharmacy: [facility]     Last office visit and reason: 5/21/19 chronic back pain     Transferred to: [facility]---------------------  From: Mike Strong MD   To: Phone Messages [employer] ([id number]_[facility]);     Sent: 8/27/2019 10:34:15 AM CDT  Subject: RE: Lisinopril      Rizwan refilled it in July when he was seen.  He should have a year's worth at the pharmacy.Return Call    Time: 12:07am    Note: Called to let patient know that it was filled for a year in July, patient states that he has no refills left. Called to speak to someone at [facility] to see if patient had refills, per [facility] patient does have refills, called to let patient know that he does have refills at the pharmacy.

## 2022-02-16 NOTE — PROGRESS NOTES
Patient:   ZHANE HOLBROOK            MRN: [id number]            FIN: [id number]               Age:   76 years     Sex:  Male     :  1945   Associated Diagnoses:   Low back pain; SK (seborrheic keratosis)   Author:   Tae COOPER [facility]      Visit Information      Date of Service: 2021 11:20 am  Performing Location: [facility]  Encounter#: [id number]      Chief Complaint   2021 11:34 AM CDT   Would like handicap forms filed out, check ears, check skin lesions, follow up back.      Subjective   Chief complaint 2021 11:34 AM CDT   Would like handicap forms filed out, check ears, check skin lesions, follow up back..     Forms filled out for handicapped permit.         Health Status   Allergies:    Allergic Reactions (Selected)  No Known Medication Allergies   Medications:  (Selected)   Prescriptions  Prescribed  CPAP +7 cm H2o: CPAP +7 cm H2o, See Instructions, Instructions: Heated humidifier, heated tubing, filters, nasal or full face mask of choice with headgear.  Replacement cushions and supplies as needed.  Months = 99 (Lifetime), Supply, # 1 EA, 0 Refill(s), Type: Maint...  Flonase 50 mcg/inh nasal spray: = 2 spray(s), Nasal, daily, # 1 EA, 11 Refill(s), Type: Maintenance, Pharmacy: [facility], 2 spray(s) Nasal daily, 67.5, in, 10/30/20 15:59:00 CDT, Height Measured, 193, lb, 10/30/20 15:59:00...  Metoprolol Succinate  mg oral tablet, extended release: = 1 tab(s) ( 100 mg ), Oral, daily, # 90 tab(s), 3 Refill(s), Type: Maintenance, Pharmacy: [facility], 1 tab(s) Oral daily, 67.5, in, 20 8:39:00 CDT, Height Measured, 194, lb, 20...  Voltaren Arthritis Pain 1% topical gel: ( 2 gm ), Topical, qid, # 240 gm, 5 Refill(s), Type: Maintenance, Pharmacy: [facility], 2 gm Topical qid,  67.5, in, 09/28/20 8:49:00 CDT, Height Measured, 195, lb, 09/28/20 8:49:00 CDT, Weigh...  atorvastatin 40 mg oral tablet: = 1 tab(s) ( 40 mg ), Oral, daily, # 90 tab(s), 3 Refill(s), Type: Maintenance, Pharmacy: [facility], 1 tab(s) Oral daily, 67.5, in, 08/28/20 8:39:00 CDT, Height Measured, 194, lb, 08/28/20 8...  azithromycin 250 mg oral tablet: = 1 packet(s), PO, Once, Instructions: as directed on package labeling, # 6 tab(s), 0 Refill(s), Type: Soft Stop, Pharmacy: [facility], 1 packet(s) Oral once,Instr:as directed on package labe...  fluticasone 50 mcg/inh nasal spray: See Instructions, Instructions: 2 spray(s)   in each nostril daily, # 15.8 mL, 5 Refill(s), Type: Maintenance, Pharmacy: [facility], 2 spray(s) ; in each nostril daily, 67.5, in, 10/30/20 15:...  lisinopril 20 mg oral tablet: = 2 tab(s) ( 40 mg ), Oral, daily, # 180 tab(s), 3 Refill(s), Type: Maintenance, Pharmacy: [facility], 2 tab(s) Oral daily,x90 day(s), 67.5, in, 10/30/20 15:59:00 CDT, Height Measured, 193, l...  meclizine 25 mg oral tablet: = 1 tab(s) ( 25 mg ), po, tid, PRN: as needed for dizziness, # 270 tab(s), 3 Refill(s), Type: Maintenance, Pharmacy: [facility], 1 tab(s) Oral tid,PRN:as needed for dizziness, 67.5, in, 08/28...  predniSONE 20 mg oral tablet: = 2 tab(s) ( 40 mg ), PO, Daily, # 10 tab(s), 0 Refill(s), Type: Maintenance, Pharmacy: [facility], 2 tab(s) Oral daily,x5 day(s), 67.5, in, 10/30/20 15:59:00 CDT, Height Measured, 199, lb, 0...   Problem list:    All Problems (Selected)  Allergic rhinitis / SNOMED CT 027611522 / Confirmed  CORBY (Obstructive Sleep Apnea) /  SNOMED CT 5515085448 / Confirmed  Nonischemic cardiomyopathy / SNOMED CT 884353506 / Confirmed  Colon adenomas / SNOMED CT 9106797308 / Confirmed  Obesity / SNOMED CT 1572237810 / Probable  Benign Essential Hypertension / SNOMED CT 8889164 / Confirmed  Chronic low back pain / SNOMED CT 213426228 / Confirmed      Objective   Vital Signs   6/29/2021 11:34 AM CDT Temperature Tympanic 97.9 DegF    Peripheral Pulse Rate 64 bpm    Pulse Site Radial artery    HR Method Manual    Systolic Blood Pressure 146 mmHg  HI    Diastolic Blood Pressure 74 mmHg    Mean Arterial Pressure 98 mmHg    BP Site Right arm    BP Method Manual      Measurements from flowsheet : Measurements   6/29/2021 11:34 AM CDT Height Measured - Standard 67 in    Height/Length Estimated 67.5 in    Weight Measured - Standard 196 lb    BSA 2.05 m2    Body Mass Index 30.69 kg/m2  HI      General:  Alert and oriented, No acute distress.    HENT:       Ear: Both ears, Canal ( minimal wax ).    Musculoskeletal:       Spine/torso exam: Lumbar ( Tenderness ).    Integumentary:  Warm, Dry, Pink, Multiple SK's  Advised no need to remove..       Impression and Plan   Assessment and Plan:          Diagnosis: Low back pain (SRH90-SD M54.5), SK (seborrheic keratosis) (TRF60-RQ L82.1).         Course: Progressing as expected.    Orders     Orders   Pharmacy:  predniSONE 20 mg oral tablet (Prescribe): = 2 tab(s) ( 40 mg ), PO, Daily, x 5 day(s), # 10 tab(s), 0 Refill(s), Type: Maintenance, Pharmacy: [facility], 2 tab(s) Oral daily,x5 day(s), 67, in, 6/29/2021 11:34 AM CDT, Height Measured, 196, lb, 6/29/2021 11:34 AM CDT, Weight Measured  predniSONE 20 mg oral tablet (Modify): = 2 tab(s) ( 40 mg ), PO, Daily, x 5 day(s), # 10 tab(s), 0 Refill(s), Type: Hard Stop, Pharmacy: [facility], 67.5, in, 10/30/20 15:59:00 CDT, Height Measured, 199, lb, 03/09/21  10:46:00 CST, Weight Measured.       He will check with a back surgeon, Dr. Shah.  F/U PRN.     If prednisone helps will consider injection by Dr. Paez..     .

## 2022-02-16 NOTE — NURSING NOTE
Lisinopril filled for 30 days , called and spoke with patient letting him know he is due to be seen.

## 2022-02-16 NOTE — PROGRESS NOTES
Patient:   SHELDON HOLBROOK            MRN: [id number]            FIN: [id number]               Age:   74 years     Sex:  Male     :  1945   Associated Diagnoses:   Urinary retention; S/P cholecystectomy   Author:   Ana Fofana MD      Visit Information      Date of Service: 10/02/2019 10:59 am  Performing Location: [facility]  Encounter#: [id number]      Primary Care Provider (PCP):  Mike Strong MD    NPI# [id number]      Referring Provider:  Ana Fofana MD    NPI# [id number]      Chief Complaint   10/2/2019 10:42 AM CDT   Remove Catheter- has had x 6 days      History of Present Illness   Sheldon is a 75y/o M who was seen at [facility] 6days ago for chest pain, notes that there was presumed pericarditis but then discovered cholecystitis. Was hospitalized for cholecystectomy. After surgery he was unable to urinate at the time of discharge, and they replaced a Nathan catheter, instructing him to leave in place for follow up removal by his primary doctor this week. He had no complications with it but says it is time to remove it. Normal urine, no fever/chills, flank pain. Additionally, has multiple well-healing abdominal incisions s/p surgery. No other concerns at this time.       Review of Systems   Constitutional:  Negative.    Eye:  Negative.    Ear/Nose/Mouth/Throat:  Negative.    Respiratory:  Negative.    Cardiovascular:  Negative.    Gastrointestinal:  Negative.    Genitourinary:  Negative.    Hematology/Lymphatics:  Negative.    Endocrine:  Negative.    Immunologic:  Negative.    Musculoskeletal:  Negative.    Integumentary:  Skin lesion.    Neurologic:  Negative.    Psychiatric:  Negative.    All other systems reviewed and negative      Health Status   Allergies:    Allergic Reactions (Selected)  No Known Medication Allergies   Problem list:    All Problems  Colon adenomas / SNOMED CT 6437739242 / Confirmed  Benign Essential Hypertension / SNOMED CT 4212934 /  Confirmed  Nonischemic cardiomyopathy / SNOMED CT 800817930 / Confirmed  Chronic low back pain / SNOMED CT 323541473 / Confirmed  Obese / ICD-9-.00 / Probable  CORBY (Obstructive Sleep Apnea) / SNOMED CT 5582935479 / Confirmed  Resolved: Inpatient stay / SNOMED CT 582194877  Resolved: Inpatient stay / SNOMED CT 408164719   Medications:  (Selected)   Prescriptions  Prescribed  CPAP +7 cm H2o: CPAP +7 cm H2o, See Instructions, Instructions: Heated humidifier, heated tubing, filters, nasal or full face mask of choice with headgear.  Replacement cushions and supplies as needed.  Months = 99 (Lifetime), Supply, # 1 EA, 0 Refill(s), Type: Maint...  atorvastatin 40 mg oral tablet: = 1 tab(s) ( 40 mg ), Oral, daily, # 90 tab(s), 3 Refill(s), Type: Maintenance, Pharmacy: [facility] #[id number], 1 tab(s) Oral daily  lisinopril 10 mg oral tablet: = 1 tab(s) ( 10 mg ), PO, Daily, # 90 tab(s), 3 Refill(s), Type: Maintenance, Pharmacy: [facility] #[id number], 1 tab(s) Oral daily  meclizine 25 mg oral tablet: = 1 tab(s) ( 25 mg ), po, tid, PRN: as needed for dizziness, # 90 tab(s), 0 Refill(s), Type: Maintenance, Pharmacy: [id number], 1 tab(s) Oral tid,PRN:as needed for dizziness  metoprolol succinate 100 mg oral tablet, extended release: = 0.5 tab(s) ( 50 mg ), po, daily, Instructions: Per [facility] 4/12/2018, # 90 tab(s), 3 Refill(s), Type: Maintenance, Pharmacy: [facility] #[id number],    Medications          *denotes recorded medication          CPAP +7 cm H2o: See Instructions, Heated humidifier, heated tubing, filters, nasal or full face mask of choice with headgear.  Replacement cushions and supplies as needed.  Months = 99 (Lifetime), 1 EA, 0 Refill(s).          atorvastatin 40 mg oral tablet: 40 mg, 1 tab(s), Oral, daily, 90 tab(s), 3 Refill(s).          lisinopril 10 mg oral tablet: 10 mg, 1 tab(s), PO, Daily, 90 tab(s), 3 Refill(s).          meclizine 25 mg oral tablet: 25 mg, 1 tab(s),  po, tid, PRN: as needed for dizziness, 90 tab(s), 0 Refill(s).          metoprolol succinate 100 mg oral tablet, extended release: 50 mg, 0.5 tab(s), po, daily, Per [facility] 2018, 90 tab(s), 3 Refill(s).          Histories   Past Medical History:    Active  OCRBY (Obstructive Sleep Apnea) (9306075061): Onset on 2014 at 69 years.  Comments:  2018 CDT 11:46 AM Jose Rivero MD  On 14 severe CORBY with AHI 81 ans oximetry naidir 64%. Optimal PAP presure not found.    2018 CDT 8:59 AM Jose Rivero MD  On 18 CPAP titration to 7.  Colon adenomas (2866423034)  Benign Essential Hypertension (7546220)  Chronic low back pain (663853155)  Resolved  Inpatient stay (672425840): Onset on 2018 at 73 years.  Resolved on 2019 at 73 years.  Comments:  1/3/2019 CST 10:04 AM Agnes Rose  @[facility], [place] - Left knee hemiarthroplasty  Inpatient stay (812499709): Onset on 2018 at 73 years.  Resolved on 2018 at 73 years.  Comments:  2018 CDT 8:24 AM Ignacia Sage  [facility] - Chest pain.   Family History:    Cancer  Brother ()     Procedure history:    Arthroplasty (3588380033) on 2018 at 73 Years.  Comments:  1/3/2019 10:07 AM Agnes Rose  left knee hemiarthroplasty  Facetectomy of vertebra (7210177) on 7/10/2017 at 72 Years.  Comments:  2017 2:23 PM Ignacia Sage  Right L3-4.  Phacoemulsification (3542709816) on 2015 at 69 Years.  Comments:  2015 7:56 AM Ignacia Parsons  Left.  Phacoemulsification (2507789371) on 2015 at 69 Years.  Comments:  2015 9:51 AM CST - Ignacia Potter.  Colonoscopy (114448437) on 10/23/2013 at 68 Years.  Comments:  10/23/2013 10:42 AM Jose Rivero MD  Pedunculated polyp 25 cm, snared, not retrieved. Severe left-sided diverticulosis. Repeat 5 years.  Colonoscopy (223923016) on 10/22/2008 at 63 Years.  Comments:  10/23/2013 10:41 AM LEONIDAS Ham  MD, Jose  1 adenoma., left-sided diverticulosis, rectal AVM. Repeat 5 years.  Colonoscopy (981893364) in 2002 at 57 Years.  Lower Back Surgery.   Social History:        Alcohol Assessment: Past            Past, Beer (12 oz)      Tobacco Assessment: Past            Past, Pipe, Started age 12 Years.  Stopped age 43 Years.      Substance Abuse Assessment: Denies Substance Abuse            Never      Employment and Education Assessment            Retired, Work/School description: [employer].      Home and Environment Assessment            Marital status: .  Spouse/Partner name: Blackfoot.  Lives with Spouse.  2 children.  Living situation:               Home/Independent.  Smoker in household: No.      Nutrition and Health Assessment            Type of diet: Regular.      Exercise and Physical Activity Assessment            Exercise frequency: Never.      Sexual Assessment            Sexually active: No.  Sexual orientation: Heterosexual.  ,        Alcohol Assessment: Past            Past, Beer (12 oz)      Tobacco Assessment: Past            Past, Pipe, Started age 12 Years.  Stopped age 43 Years.      Substance Abuse Assessment: Denies Substance Abuse            Never      Employment and Education Assessment            Retired, Work/School description: [employer].      Home and Environment Assessment            Marital status: .  Spouse/Partner name: Blackfoot.  Lives with Spouse.  2 children.  Living situation:               Home/Independent.  Smoker in household: No.      Nutrition and Health Assessment            Type of diet: Regular.      Exercise and Physical Activity Assessment            Exercise frequency: Never.      Sexual Assessment            Sexually active: No.  Sexual orientation: Heterosexual.        Physical Examination   Vital Signs   10/2/2019 10:42 AM CDT Peripheral Pulse Rate 95 bpm    HR Method Electronic    Systolic Blood Pressure 116 mmHg    Diastolic Blood Pressure 72 mmHg     Mean Arterial Pressure 87 mmHg    BP Site Left arm    BP Method Manual    Oxygen Saturation 98 %      Measurements from flowsheet : Measurements   10/2/2019 10:42 AM CDT Height Measured - Standard 67.5 in    Weight Measured - Standard 192.6 lb    BSA 2.04 m2    Body Mass Index 29.72 kg/m2  HI      General:  Alert and oriented, No acute distress.    Neck:  Supple, Non-tender.    Gastrointestinal:  Soft, Non-tender, Non-distended.    Genitourinary:  Nathan Catheter in place. Normal urine color. .    Integumentary:  Warm, Dry, Multiple well-healing post-surgical incisions on abdomen. No signs of infection. .    Neurologic:  Alert, Oriented, No focal deficits.    Psychiatric:  Cooperative, Appropriate mood & affect, Normal judgment.       Review / Management   Catheter removal completed by Dr. Fofana and ERICK Husain.       Impression and Plan   Diagnosis     Urinary retention (LHT81-RY R33.9).     S/P cholecystectomy (ETM03-ND Z90.49).     Nathan Catheter Removal.     Course:  Progressing as expected.    Plan:  Nathan Catheter was removed in the office without complications. Patient was instructed to monitor for normal urination for the next day. If unable to pass urine and develops discomfort, directed to immediately come back to the office.  At that time, we would need to replace the catheter and refer to urology.  Incisions on abdomen healing well without additional treatment or intervention necessary. .      Patient was seen with student ERICK uHsain, and history and exam confirmed personally by me. Agree that documentation reflects findings and plan. I completed medical decision making after discussion with student and with patient.  Ana Fofana MD

## 2022-02-16 NOTE — TELEPHONE ENCOUNTER
Entered by Yulisa Gonzalez MA on August 27, 2020 11:46:10 AM CDT  ---------------------  From: Yulisa Gonzalez MA   To: [facility] [place]    Sent: 8/27/2020 11:46:10 AM CDT  Subject: Medication Management     ** Not Approved: Patient has requested refill too soon, refilled 4/30/2020 for #90 w/ 3 refills, pt also has appt 8/28/2020 **  lisinopril (lisinopril 10 mg tablet)  TAKE ONE TABLET BY MOUTH DAILY  Qty:  90 tab(s)        Days Supply:  90        Refills:  2          Substitutions Allowed     Route To Pharmacy - [facility]   Signed by Yulisa Gonzalez MA            ** Patient matched by Yulisa Gonzalez MA on 8/27/2020 11:45:04 AM CDT **      ------------------------------------------  From: [facility]  To: Rizwan Noe PA-C  Sent: August 27, 2020 8:30:25 AM CDT  Subject: Medication Management  Due: August 12, 2020 4:14:54 PM CDT     ** On Hold Pending Signature **     Drug: lisinopril (lisinopril 10 mg oral tablet), TAKE ONE TABLET BY MOUTH DAILY  Quantity: 90 tab(s)  Days Supply: 90  Refills: 1  Substitutions Allowed  Notes from Pharmacy:     Dispensed Drug: lisinopril (lisinopril 10 mg oral tablet), TAKE ONE TABLET BY MOUTH DAILY  Quantity: 90 tab(s)  Days Supply: 90  Refills: 2  Substitutions Allowed  Notes from Pharmacy:  ------------------------------------------

## 2022-02-16 NOTE — PROGRESS NOTES
Patient:   ZHANE HOLBROOK            MRN: [id number]            FIN: [id number]               Age:   75 years     Sex:  Male     :  1945   Associated Diagnoses:   Hypertension with target organ involvement   Author:   Mike Strong MD      Visit Information      Date of Service: 2020 08:00 am  Performing Location: [facility]  Encounter#: [id number]      Primary Care Provider (PCP):  Mike Strong MD    NPI# [id number]      Referring Provider:  Mike Strong MD    NPI# [id number]   Visit type:  Telephone Encounter.    Source of history:  Patient.    Location of patient: Home  Call Start Time:   10:50  Call End Time:    11:03      Chief Complaint   2020 10:24 AM CDT   Elevated bloodpressure 230/106 yesterday 3pm, Dizzy and  lightheaded this morning, Had not been using Cpap for 3 months, did use it last night, Dr Munguia advised patient to double lisinopril dosage; Verbal permission for telephone visit     _      History of Present Illness   Today's visit was conducted via telephone due to the COVID-19 pandemic. Patient's consent to telephone visit was obtained and documented.      Reason for visit: HTN with stopping CPAP and having [facility] bishop per ophthalmology.  Also restarted CPAP and BP better controlled.           Review of Systems   Constitutional:  Negative.    Eye:  Negative except as documented in history of present illness.    Cardiovascular:  Negative except as documented in history of present illness.       Impression and Plan   Diagnosis     Hypertension with target organ involvement (ZGO76-TV I10).     Course:  Worsening.    Orders     Orders (Selected)   Prescriptions  Prescribed  Metoprolol Succinate  mg oral tablet, extended release: = 1 tab(s) ( 100 mg ), Oral, daily, # 90 tab(s), 3 Refill(s), Type: Maintenance, [facility] , 1 tab(s) Oral daily,x90 day(s)  lisinopril 20 mg oral tablet: = 1 tab(s) ( 20 mg ),  Oral, daily, # 90 tab(s), 3 Refill(s), Type: Maintenance, Pharmacy: [facility] , 1 tab(s) Oral daily.     Note increase in both.  Will see him back in clinic tomorrow with home cuff..        Health Status   Allergies:    Allergic Reactions (Selected)  No Known Medication Allergies   Medications:  (Selected)   Prescriptions  Prescribed  CPAP +7 cm H2o: CPAP +7 cm H2o, See Instructions, Instructions: Heated humidifier, heated tubing, filters, nasal or full face mask of choice with headgear.  Replacement cushions and supplies as needed.  Months = 99 (Lifetime), Supply, # 1 EA, 0 Refill(s), Type: Maint...  atorvastatin 40 mg oral tablet: = 1 tab(s) ( 40 mg ), Oral, daily, # 90 tab(s), 3 Refill(s), Type: Maintenance, Pharmacy: [facility] #[id number], 1 tab(s) Oral daily  lisinopril 10 mg oral tablet: = 1 tab(s) ( 10 mg ), PO, Daily, # 90 tab(s), 3 Refill(s), Type: Maintenance, Pharmacy: [facility] #[id number], 1 tab(s) Oral daily  meclizine 25 mg oral tablet: = 1 tab(s) ( 25 mg ), po, tid, PRN: as needed for dizziness, # 90 tab(s), 0 Refill(s), Type: Maintenance, Pharmacy: [id number], 1 tab(s) Oral tid,PRN:as needed for dizziness  metoprolol succinate 100 mg oral tablet, extended release: = 0.5 tab(s) ( 50 mg ), po, daily, Instructions: Per [facility] 4/12/2018, # 90 tab(s), 3 Refill(s), Type: Maintenance, Pharmacy: [facility] #[id number]   Problem list:    All Problems  Benign Essential Hypertension / SNOMED CT 5312047 / Confirmed  Chronic low back pain / SNOMED CT 196654509 / Confirmed  Colon adenomas / SNOMED CT 4988404049 / Confirmed  Nonischemic cardiomyopathy / SNOMED CT 160102048 / Confirmed  Obese / ICD-9-.00 / Probable  CORBY (Obstructive Sleep Apnea) / SNOMED CT 7820731145 / Confirmed      Histories   Past Medical History:    Active  CORBY (Obstructive Sleep Apnea) (SNOMED CT 9583091210): Onset on 4/30/2014 at 69 years.  Comments:  4/20/2018 CDT 11:46 AM CDT  Jose Alicea MD  On 14 severe CORBY with AHI 81 ans oximetry naidir 64%. Optimal PAP presure not found.    2018 CDT 8:59 AM Jose Rivero MD  On 18 CPAP titration to 7.  Colon adenomas (SNOMED CT 7292150855)  Benign Essential Hypertension (SNOMED CT 5638737)  Chronic low back pain (SNOMED CT 949215079)  Resolved  Inpatient stay (SNOMED CT 569815469): Onset on 2018 at 73 years.  Resolved on 2019 at 73 years.  Comments:  1/3/2019 CST 10:04 AM Agnes Rose  @[place], [place] - Left knee hemiarthroplasty  Inpatient stay (SNOMED CT 495304698): Onset on 2018 at 73 years.  Resolved on 2018 at 73 years.  Comments:  2018 CDT 8:24 AM T - Ignacia Potter  [facility] - Chest pain.   Family History:    Cancer  Brother ()     Procedure history:    Arthroplasty (SNOMED CT 5196296849) performed by Leon Florentino MD on 2018 at 73 Years.  Comments:  1/3/2019 10:07 AM Agnes Rose  left knee hemiarthroplasty  Facetectomy of vertebra (SNOMED CT 3463830) performed by Efrain Ramos MD on 7/10/2017 at 72 Years.  Comments:  2017 2:23 PM CDT - Ignacia Potter  Right L3-4.  Phacoemulsification (SNOMED CT 4296530327) performed by Edmond Munguia MD on 2015 at 69 Years.  Comments:  2015 7:56 AM Ignacia Parsons  Left.  Phacoemulsification (SNOMED CT 1466522317) performed by Edmond Munguia MD on 2015 at 69 Years.  Comments:  2015 9:51 AM Ignacia Parsons  Right.  Colonoscopy (SNOMED CT 610204136) performed by Jose Ham MD on 10/23/2013 at 68 Years.  Comments:  10/23/2013 10:42 AM Jose Rivero MD  Pedunculated polyp 25 cm, snared, not retrieved. Severe left-sided diverticulosis. Repeat 5 years.  Colonoscopy (SNOMED CT 629128613) performed by Jose Ham MD on 10/22/2008 at 63 Years.  Comments:  10/23/2013 10:41 AM CDT - Jose Ham MD  1 adenoma., left-sided diverticulosis, rectal AVM. Repeat  5 years.  Colonoscopy (SNOMED CT 289504314) in 2002 at 57 Years.  Lower Back Surgery.   Social History:        Alcohol Assessment: Past            Past, Beer (12 oz)      Tobacco Assessment: Past            Past, Pipe, Started age 12 Years.  Stopped age 43 Years.      Substance Abuse Assessment: Denies Substance Abuse            Never      Employment and Education Assessment            Retired, Work/School description: [employer].      Home and Environment Assessment            Marital status: .  Spouse/Partner name: Josey.  Lives with Spouse.  2 children.  Living situation:               Home/Independent.  Smoker in household: No.      Nutrition and Health Assessment            Type of diet: Regular.      Exercise and Physical Activity Assessment            Exercise frequency: Never.      Sexual Assessment            Sexually active: No.  Sexual orientation: Heterosexual.

## 2022-02-16 NOTE — LETTER
(Inserted Image. Unable to display)   [address] [place]  [address]  [phone]    August 31, 2020      ZHANE MCLEODE      [address], [place] [id number]        Dear ZHANE,    Thank you for selecting [facility] for your healthcare needs. Below you will find the result of your recent test(s) done at our clinic.     These labs are acceptable. No sign of Rheumatoid Arthritis. X-rays show much osteoarthritis in shoulders and hands. There is some narrowing in the shoulder joint spaces consistent with rotator cuff abnormalities. If you wish to pursue this further, please call my office to discuss. Your lipids are excellent.      Result Name Current Result Previous Result Reference Range   Sodium Level (mmol/L)  142 8/28/2020  142 7/25/2019 135 - 146   Potassium Level (mmol/L)  4.7 8/28/2020  4.9 7/25/2019 3.5 - 5.3   Chloride Level (mmol/L)  108 8/28/2020  107 7/25/2019 98 - 110   CO2 Level (mmol/L)  27 8/28/2020  28 7/25/2019 20 - 32   Glucose Level (mg/dL) ((H)) 106 8/28/2020 ((H)) 105 7/25/2019 65 - 99   BUN (mg/dL)  20 8/28/2020 ((H)) 27 7/25/2019 7 - 25   Creatinine Level (mg/dL) ((H)) 1.22 8/28/2020 ((H)) 1.28 7/25/2019 0.70 - 1.18   Calcium Level (mg/dL)  9.3 8/28/2020  9.7 7/25/2019 8.6 - 10.3   C-Reactive Protein (CRP) (mg/L)  2.6 8/28/2020   - <8.0   Cholesterol (mg/dL)  105 8/28/2020  122 7/25/2019  - <200   Non-HDL Cholesterol  56 8/28/2020  70 7/25/2019  - <130   HDL (mg/dL)  49 8/28/2020  52 7/25/2019 > OR = 40 -    Cholesterol/HDL Ratio  2.1 8/28/2020  2.3 7/25/2019  - <5.0   LDL  43 8/28/2020  56 7/25/2019    Triglyceride (mg/dL)  52 8/28/2020  65 7/25/2019  - <150   WBC  7.8 8/28/2020  7.2 7/25/2019 3.8 - 10.8   RBC  4.93 8/28/2020  5.27 7/25/2019 4.20 - 5.80   Hgb (gm/dL)  14.4 8/28/2020  15.1 7/25/2019 13.2 - 17.1   Hct (%)  42.6 8/28/2020  46.4 7/25/2019 38.5 - 50.0   MCV (fL)  86.4 8/28/2020  88.0 7/25/2019 80.0 - 100.0   MCH (pg)  29.2 8/28/2020  28.7  7/25/2019 27.0 - 33.0   MCHC (gm/dL)  33.8 8/28/2020  32.5 7/25/2019 32.0 - 36.0   RDW (%)  12.4 8/28/2020  12.7 7/25/2019 11.0 - 15.0   Platelet  266 8/28/2020  265 7/25/2019 140 - 400   MPV (fL)  11.2 8/28/2020  11.7 7/25/2019 7.5 - 12.5   MEENA IFA  NEGATIVE 8/28/2020  NEGATIVE - NEGATIVE   Rheumatoid Factor (IU/mL)  <14 8/28/2020   - <14   Cyclic Citrullinated Peptide (CCP)  <16 8/28/2020         Please contact my practice at [phone] if you have any questions or concerns.      Sincerely,        Mike Strong MD ,   Nydia Fofana MD,   Rizwan Noe PA-C

## 2022-02-16 NOTE — CARE COORDINATION
Lucia from [facility] called requesting a copy of patient's pre op px, EKG and labs.  She has not received them and surgery is on Monday.  I faxed to [phone]

## 2022-02-16 NOTE — PROGRESS NOTES
Patient:   ZHANE HOLBROOK            MRN: [id number]            FIN: [id number]               Age:   73 years     Sex:  Male     :  1945   Associated Diagnoses:   Hypertension; Pure hypercholesterolemia; Nonischemic cardiomyopathy; Bilateral knee pain   Author:   Mike Strong MD      Visit Information      Date of Service: 2018 07:53 am  Performing Location: [facility]  Encounter#: [id number]      Primary Care Provider (PCP):  Mike Strong MD    NPI# [id number]      Referring Provider:  Mike Strong MD# [id number]      Chief Complaint   2018 8:00 AM CDT    F/U Heart attack; Knee problems; Check ears     Chief complaint and symptoms noted above confirmed with patient.      History of Present Illness             The patient presents for follow-up evaluation of hypertension.  The quality of hypertension symptom(s) since the patient's last visit is described as being unchanged.  The severity of the hypertension symptom(s) since the last visit is moderate.  Since the patient's last visit, the timing/course of hypertension symptom(s) is constant.  Exacerbating factors consist of none.  Relieving factors consist of medication.        Interval History   Ischemic Heart Disease   The course is progressing as expected.  The effect on daily activities is no change in activity level and no change in household functions.  No anginal episodes.     Cholesterol Management   Total cholesterol results See below.     Exclusions   Adherence Characterized by patient is taking meds as directed.        Review of Systems   Constitutional:  Negative.    Respiratory:  Negative.    Cardiovascular:  Negative except as documented in history of present illness.    Gastrointestinal:  Negative.    Musculoskeletal:  Joint pain, bilateral knee pain left > right.       Health Status   Allergies:    Allergic Reactions (Selected)  No Known Medication Allergies   Medications:  (Selected)    Prescriptions  Prescribed  CPAP +7 cm H2o: CPAP +7 cm H2o, See Instructions, Instructions: Heated humidifier, heated tubing, filters, nasal or full face mask of choice with headgear.  Replacement cushions and supplies as needed.  Months = 99 (Lifetime), Supply, # 1 EA, 0 Refill(s), Type: Maint...  atorvastatin 40 mg oral tablet: 1 tab(s) ( 40 mg ), po, daily, Instructions: Per [facility] 4/12/2018, # 90 tab(s), 3 Refill(s), Type: Maintenance, Pharmacy: [facility] #[id number], 1 tab(s) Oral daily,Instr:Per [facility] 4/12/2018  doxycycline hyclate 100 mg oral tablet: 1 tab(s) ( 100 mg ), PO, bid, # 20 tab(s), 0 Refill(s), Type: Maintenance, Pharmacy: [facility] #[id number], 1 tab(s) Oral bid,x10 day(s)  lisinopril 10 mg oral tablet: 1 tab(s) ( 10 mg ), PO, Daily, # 90 tab(s), 3 Refill(s), Type: Maintenance, Pharmacy: [facility] #[id number], 1 tab(s) Oral daily  meclizine 25 mg oral tablet: 1 tab(s) ( 25 mg ), po, tid, PRN: as needed for dizziness, # 30 tab(s), 2 Refill(s), Type: Maintenance, Pharmacy: [facility] #[id number], 1 tab(s) Oral tid,PRN:as needed for dizziness  metoprolol succinate 100 mg oral tablet, extended release: 1 tab(s) ( 100 mg ), po, daily, Instructions: Per [facility] 4/12/2018, # 90 tab(s), 3 Refill(s), Type: Maintenance, Pharmacy: [facility] #[id number], 1 tab(s) Oral daily,Instr:Per [facility] 4/12/2018  Documented Medications  Documented  aspirin 81 mg oral tablet: 1 tab(s) ( 81 mg ), po, daily, Instructions: Per [facility] 4/12/2018, 0 Refill(s), Type: Maintenance  latanoprost 0.005% ophthalmic solution: 1 drop(s), both eyes, qpm, 0 Refill(s), Type: Maintenance   Problem list:    All Problems (Selected)  Benign Essential Hypertension / SNOMED CT 8347607 / Confirmed  Nonischemic cardiomyopathy / SNOMED CT 288742771 / Confirmed  Chronic low back pain / SNOMED CT 048372867 / Confirmed  Colon Adenomas / ICD-9-.3 / Confirmed  Obese / ICD-9-.00 / Probable  CORBY (Obstructive Sleep Apnea) / SNOMED CT  4302882269 / Confirmed      Histories   Past Medical History:    Active  CORBY (Obstructive Sleep Apnea) (SNOMED CT 7783403556): Onset on 2014 at 69 years.  Comments:  2018 CDT 11:46 AM CDT - Jose Ham MD  On 14 severe CORBY with AHI 81 ans oximetry naidir 64%. Optimal PAP presure not found.    2018 CDT 8:59 AM CDT - Jose Ham MD  On 18 CPAP titration to 7.  Colon Adenomas (ICD-9-.3)  Benign Essential Hypertension (SNOMED CT 2980053)  Chronic low back pain (SNOMED CT 575754443)  Resolved  Inpatient stay (SNOMED CT 359266944): Onset on 2018 at 73 years.  Resolved on 2018 at 73 years.  Comments:  2018 CDT 8:24 AM CDT - Ignacia Potter  [facility] - Chest pain.   Family History:    Cancer  Brother ()     Procedure history:    Facetectomy of vertebra (SNOMED CT 8333221) performed by Efrain Ramos MD on 7/10/2017 at 72 Years.  Comments:  2017 2:23 PM - Ignacia Potter  Right L3-4.  Phacoemulsification (SNOMED CT 5869537654) performed by Edmond Munguia MD on 2015 at 69 Years.  Comments:  2015 7:56 AM - Ignacia Potter  Left.  Phacoemulsification (SNOMED CT 9022010241) performed by Edmond Munguia MD on 2015 at 69 Years.  Comments:  2015 9:51 AM - Ignacia Potter  Right.  Colonoscopy (SNOMED CT 202012502) performed by Jose Ham MD on 10/23/2013 at 68 Years.  Comments:  10/23/2013 10:42 AM - Jose Ham MD  Pedunculated polyp 25 cm, snared, not retrieved. Severe left-sided diverticulosis. Repeat 5 years.  Colonoscopy (SNOMED CT 783431574) performed by Jose Ham MD on 10/22/2008 at 63 Years.  Comments:  10/23/2013 10:41 AM - Jose Ham MD  1 adenoma., left-sided diverticulosis, rectal AVM. Repeat 5 years.  Colonoscopy (SNOMED CT 587395104) in  at 57 Years.  Lower Back Surgery.   Social History:        Alcohol Assessment: Past            Past, Beer (12 oz)      Tobacco Assessment: Past            Past, Pipe, Started  age 12 Years.  Stopped age 43 Years.      Substance Abuse Assessment: Denies Substance Abuse            Never      Employment and Education Assessment            Retired, Work/School description: [employer].      Home and Environment Assessment            Marital status: .  Spouse/Partner name: Josey.  Lives with Spouse.  2 children.  Living situation:               Home/Independent.  Smoker in household: No.      Nutrition and Health Assessment            Type of diet: Regular.      Exercise and Physical Activity Assessment            Exercise frequency: Never.      Sexual Assessment            Sexually active: No.  Sexual orientation: Heterosexual.        Physical Examination   Vital Signs   7/27/2018 8:00 AM CDT Temperature Tympanic 97.7 DegF  LOW    Peripheral Pulse Rate 60 bpm    Pulse Site Radial artery    HR Method Manual    Systolic Blood Pressure 134 mmHg  HI    Diastolic Blood Pressure 78 mmHg    Mean Arterial Pressure 97 mmHg    BP Site Left arm    BP Method Manual      Measurements from flowsheet : Measurements   7/27/2018 8:00 AM CDT Height Measured - Standard 67.5 in    Weight Measured - Standard 197.4 lb    BSA 2.06 m2    Body Mass Index 30.46 kg/m2  HI      General:  Alert and oriented, No acute distress.    HENT:  Normocephalic.    Neck:  Supple.    Respiratory:  Lungs are clear to auscultation, Respirations are non-labored.    Cardiovascular:  Normal rate, Regular rhythm.    Gastrointestinal:  Soft, Non-tender, Non-distended.    Musculoskeletal:       Lower extremity exam: Knee ( Bilateral, Tenderness ).    Integumentary:  Warm, Dry, Pink.    Neurologic:  Alert.    Psychiatric:  Cooperative, Appropriate mood & affect.       Procedure   Joint aspiration/ injection procedure   Date/ Time:  7/27/2018 9:05:00 AM.     Confirmed: patient.     Performed by: self.     Informed consent: Verbally Obtained.     Indication: symptomatic relief.     Location: the left knee.     Preparation and  technique: skin prep alcohol, sterile preparation of site in usual fashion, sterile needle used (size #25 gauge, length 1.5 inch).     Joint injected: with  Celestone 1  ml.     Procedure tolerated: well.     No Complications.        Review / Management   Results review:  Lab results   4/18/2018 3:56 PM CDT Sodium Level 137 mmol/L    Potassium Level 4.4 mmol/L    Chloride Level 102 mmol/L    CO2 Level 28 mmol/L    Glucose Level 83 mg/dL    BUN 28 mg/dL  HI    Creatinine Level 1.73 mg/dL  HI    BUN/Creat Ratio 16    eGFR 38 mL/min/1.73m2  LOW    eGFR African American 44 mL/min/1.73m2  LOW    Calcium Level 9.5 mg/dL    B-Natriuretic Peptide (BNP) 123 pg/mL  HI   12/7/2017 12:08 PM CST WBC 8.1    Hgb 16.0 gm/dL   5/13/2016 10:10 AM CDT Cholesterol 135 mg/dL    Non-HDL Cholesterol 89    HDL 46 mg/dL    Cholesterol/HDL Ratio 2.9    LDL 76    Triglyceride 65 mg/dL   .    Radiology results   X-ray, DJD left knee greater than right      Impression and Plan   Diagnosis     Hypertension (DMQ76-KN I10).     Course:  Progressing as expected.    Orders     Orders (Selected)   Prescriptions  Prescribed  lisinopril 10 mg oral tablet: 1 tab(s) ( 10 mg ), PO, Daily, # 90 tab(s), 3 Refill(s), Type: Maintenance, Pharmacy: [facility] #[id number], 1 tab(s) Oral daily.     Diagnosis   Diagnosis     Pure hypercholesterolemia (HDE08-BA E78.00).     Course:  Progressing as expected.    Orders     Orders (Selected)   Prescriptions  Prescribed  atorvastatin 40 mg oral tablet: 1 tab(s) ( 40 mg ), po, daily, Instructions: [facility] 4/12/2018, # 90 tab(s), 3 Refill(s), Type: Maintenance, Pharmacy: [facility] #[id number], 1 tab(s) Oral daily,Instr:Per [facility] 4/12/2018.     Diagnosis     Nonischemic cardiomyopathy (VYA03-KY I42.8).     Orders     Orders   Lab (Gen Lab  Reference Lab):  Lipid panel with reflex to direct ldl* ([facility]) (Order): Specimen Type: Serum, Collection Date: 7/27/2018 8:36 AM CDT  Comprehensive Metabolic Panel* ([facility])  (Order): Specimen Type: Serum, Collection Date: 7/27/2018 8:36 AM CDT  CBC (includes diff/plt)* ([facility]) (Order): Specimen Type: Blood, Collection Date: 7/27/2018 8:36 AM CDT.     Orders (Selected)   Prescriptions  Prescribed  metoprolol succinate 100 mg oral tablet, extended release: 1 tab(s) ( 100 mg ), po, daily, Instructions: Per [facility] 4/12/2018, # 90 tab(s), 3 Refill(s), Type: Maintenance, Pharmacy: [facility] #[id number], 1 tab(s) Oral daily,Instr:Per [facility] 4/12/2018.     Diagnosis     Bilateral knee pain (KYP43-JT M25.561).     Course:  Progressing as expected.    Counseled:  Patient.    Patient Instructions:  Launch follow-up (if licensed).       Professional Services   Counseling Summary:  This was a 40 minute visit with greater than 50% of that time spent counseling the patient, and coordination of care..    Counseling included treatment options, risks and benefits, lifestyle changes, prognosis, current condition, medications, dose adjustments, and new diagnosis.    The patient was interactive, attentive, asked questions, and verbalized understanding.

## 2022-02-16 NOTE — LETTER
(Inserted Image. Unable to display)   July 24, 2019      ZHANE HOLBROOK   [place], [place] [id number]        Dear ZHANE,      Thank you for selecting [facility] [place] [place] [facility] for your healthcare needs.     Our records indicate you are due for the following services:     Annual Physical  Medication Check    To schedule an appointment or if you have further questions, please contact your primary clinic:   [facility]          [phone]   [facility]    [phone]             [facility]         [phone]      Powered by [employer]    Sincerely,    Mike Strong M.D.

## 2022-02-16 NOTE — NURSING NOTE
Fecal Immunochemistry Test (FIT) POC Entered On:  7/26/2019 11:37 AM CDT    Performed On:  7/26/2019 11:36 AM CDT by Schoenike , Andrea               FIT POC   FIT Fecal Occult Blood :   Negative   POC Test Comments :   FWD to T   Schoenike , Andrea - 7/26/2019 11:36 AM CDT   Details (FIT)   FIT Collection Date :   7/26/2019 8:00 AM CDT   FIT Handling Specimen POC :   Stool   FIT POC Test Comments :   Lab Test Preformed by:   [facility] Office  [address] [place] [address]  Phone: [phone]  Fax: [phone]     Schoenike , Andrea - 7/26/2019 11:36 AM CDT

## 2022-02-16 NOTE — NURSING NOTE
Comprehensive Intake Entered On:  5/1/2020 10:55 AM CDT    Performed On:  5/1/2020 10:53 AM CDT by Liza Stone MA               Summary   Systolic Blood Pressure :   136 mmHg (HI)    Diastolic Blood Pressure :   82 mmHg (HI)    Mean Arterial Pressure :   100 mmHg   Peripheral Pulse Rate :   80 bpm   BP Site :   Right arm   Pulse Site :   Radial artery   BP Method :   Manual   HR Method :   Manual   Liza Stone MA - 5/1/2020 10:56 AM CDT   Chief Complaint :   Elevated blood pressure   Menstrual Status :   N/A   Weight Measured :   193 lb(Converted to: 193 lb 0 oz, 87.54 kg)    Height Measured :   67.5 in(Converted to: 5 ft 7 in, 171.45 cm)    Body Mass Index :   29.78 kg/m2 (HI)    Body Surface Area :   2.04 m2   Temperature Tympanic :   97.9 DegF(Converted to: 36.6 DegC)    Liza Stone MA - 5/1/2020 10:53 AM CDT   Health Status   Allergies Verified? :   Yes   Medication History Verified? :   Yes   Immunizations Current :   Yes   Medical History Verified? :   Yes   Pre-Visit Planning Status :   Completed   Tobacco Use? :   Never smoker   Liza Stone MA - 5/1/2020 10:53 AM CDT   Consents   Consent for Immunization Exchange :   Consent Granted   Consent for Immunizations to Providers :   Consent Granted   Liza Stone MA - 5/1/2020 10:53 AM CDT   Meds / Allergies   (As Of: 5/1/2020 10:55:42 AM CDT)   Allergies (Active)   No Known Medication Allergies  Estimated Onset Date:   Unspecified ; Created By:   Daina Alvarez CMA; Reaction Status:   Active ; Category:   Drug ; Substance:   No Known Medication Allergies ; Type:   Allergy ; Updated By:   Daina Alvarez CMA; Reviewed Date:   5/1/2020 10:55 AM CDT        Medication List   (As Of: 5/1/2020 10:55:42 AM CDT)   Prescription/Discharge Order    lisinopril  :   lisinopril ; Status:   Prescribed ; Ordered As Mnemonic:   lisinopril 20 mg oral tablet ; Simple Display Line:   20 mg, 1 tab(s), Oral, daily, 90 tab(s), 3 Refill(s) ; Ordering Provider:   Tae  Mike COOPER; Catalog Code:   lisinopril ; Order Dt/Tm:   4/30/2020 11:00:45 AM CDT          metoprolol  :   metoprolol ; Status:   Prescribed ; Ordered As Mnemonic:   Metoprolol Succinate  mg oral tablet, extended release ; Simple Display Line:   100 mg, 1 tab(s), Oral, daily, for 90 day(s), 90 tab(s), 3 Refill(s) ; Ordering Provider:   Mike Strong MD; Catalog Code:   metoprolol ; Order Dt/Tm:   4/30/2020 10:58:42 AM CDT          atorvastatin  :   atorvastatin ; Status:   Prescribed ; Ordered As Mnemonic:   atorvastatin 40 mg oral tablet ; Simple Display Line:   40 mg, 1 tab(s), Oral, daily, 90 tab(s), 3 Refill(s) ; Ordering Provider:   Rizwan Noe PA-C; Catalog Code:   atorvastatin ; Order Dt/Tm:   7/25/2019 10:32:18 AM CDT          meclizine  :   meclizine ; Status:   Prescribed ; Ordered As Mnemonic:   meclizine 25 mg oral tablet ; Simple Display Line:   25 mg, 1 tab(s), po, tid, PRN: as needed for dizziness, 90 tab(s), 0 Refill(s) ; Ordering Provider:   Mike Strong MD; Catalog Code:   meclizine ; Order Dt/Tm:   6/25/2019 1:44:32 PM CDT          Miscellaneous Rx Supply  :   Miscellaneous Rx Supply ; Status:   Prescribed ; Ordered As Mnemonic:   CPAP +7 cm H2o ; Simple Display Line:   See Instructions, Heated humidifier, heated tubing, filters, nasal or full face mask of choice with headgear.  Replacement cushions and supplies as needed.  Months = 99 (Lifetime), 1 EA, 0 Refill(s) ; Ordering Provider:   Jose Ham MD; Catalog Code:   Miscellaneous Rx Supply ; Order Dt/Tm:   5/29/2018 9:03:09 AM CDT            ID Risk Screen   Recent Travel History :   No recent travel   Family Member Travel History :   No recent travel   Other Exposure to Infectious Disease :   Unknown   Liza Stone MA - 5/1/2020 10:53 AM CDT

## 2022-02-16 NOTE — TELEPHONE ENCOUNTER
Entered by Rizwan Noe PA-C on November 09, 2021 7:14:31 AM CST  ---------------------  From: Rizwan Noe PA-C   To: [facility] [employer] [facility]    Sent: 11/9/2021 7:14:31 AM CST  Subject: Medication Management     ** Submitted: **  Complete:metoprolol (Metoprolol Succinate  mg oral tablet, extended release)   Signed by Rizwan Noe PA-C  11/9/2021 1:14:00 PM [facility]    ** Approved with modifications: **  metoprolol (metoprolol succinate  mg tablet,extended release 24 hr)  TAKE ONE Tablet BY MOUTH EVERY DAY  Qty:  90 tab(s)        Days Supply:  90        Refills:  3          Substitutions Allowed     Route To Pharmacy - [facility] [employer] [facility]   Note from Pharmacy:  This prescription was filled on 8/19/2021. Any refills authorized will be placed on file.              ------------------------------------------  From: [facility]  To: Rizwan Noe PA-C  Sent: November 5, 2021 4:26:47 PM CDT  Subject: Medication Management  Due: October 21, 2021 8:18:04 PM CDT     ** On Hold Pending Signature **     Drug: metoprolol (Metoprolol Succinate  mg oral tablet, extended release), 1 tab(s) Oral daily  Quantity: 90 tab(s)  Days Supply: 0  Refills: 2  Substitutions Allowed  Notes from Pharmacy:     Dispensed Drug: metoprolol (Metoprolol Succinate  mg oral tablet, extended release), TAKE ONE Tablet BY MOUTH EVERY DAY  Quantity: 90 tab(s)  Days Supply: 90  Refills: 3  Substitutions Allowed  Notes from Pharmacy: This prescription was filled on 8/19/2021. Any refills authorized will be placed on file.  ------------------------------------------

## 2022-02-16 NOTE — PROGRESS NOTES
Patient:   ZHANE HOLBROOK            MRN: [id number]            FIN: [id number]               Age:   72 years     Sex:  Male     :  1945   Associated Diagnoses:   Preop testing; Chronic low back pain; Benign Essential Hypertension; CORBY (Obstructive Sleep Apnea)   Author:   Mike Strong MD      Preoperative Information   Indication for surgery   Accompanied by:  No one.    Source of history:  Self.           Chief Complaint   6/15/2017 8:54 AM CDT    HTN med check, pt fasting for labs; H&P back surgery [place] 7/10/17 [facility]     Chief complaint and symptoms noted above confirmed with patient.      Review of Systems   Constitutional:  Negative.    Eye:  Negative.    Ear/Nose/Mouth/Throat:  Negative.    Respiratory:  Negative.    Cardiovascular:  Negative.    Gastrointestinal:  Negative.    Genitourinary:  Negative.    Hematology/Lymphatics:  Negative.    Endocrine:  Negative.    Immunologic:  Negative.    Musculoskeletal:  Negative.    Integumentary:  Negative.    Neurologic:  Negative.    Psychiatric:  Negative.       Health Status   Allergies:    Allergic Reactions (Selected)  No Known Medication Allergies   Medications:  (Selected)   Prescriptions  Prescribed  lisinopril 10 mg oral tablet: 1 tab(s) ( 10 mg ), PO, Daily, # 30 tab(s), 0 Refill(s), Type: Maintenance, Pharmacy: [facility] #[id number], Needs appt for further refills, 1 tab(s) po daily  Documented Medications  Documented  Excedrin: 2 tab(s), po, q6 hrs, 0 Refill(s), Type: Maintenance  occuvite- eye vitamin: occuvite- eye vitamin, Supply, 0 Refill(s), Type: Maintenance   Problem list:    All Problems  Benign Essential Hypertension / SNOMED CT 4064445 / Confirmed  Chronic low back pain / SNOMED CT 172932434 / Confirmed  Colon Adenomas / ICD-9-.3 / Confirmed  Obese / ICD-9-.00 / Probable  CORBY (Obstructive Sleep Apnea) / SNOMED CT 4952138384 / Confirmed      Histories   Past Medical History:    No active or resolved  past medical history items have been selected or recorded.   Family History:    Cancer  Brother ()     Procedure history:    Phacoemulsification (SNOMED CT 2556113945) performed by Edmond Munguia MD on 2015 at 69 Years.  Comments:  2015 7:56 AM - Ignacia Potter  Left.  Phacoemulsification (SNOMED CT 8167449034) performed by Edmond Munguia MD on 2015 at 69 Years.  Comments:  2015 9:51 AM - Ignacia Potter  Right.  Colonoscopy (SNOMED CT 385522141) performed by Jose Ham MD on 10/23/2013 at 68 Years.  Comments:  10/23/2013 10:42 Jose Pierce MD  Pedunculated polyp 25 cm, snared, not retrieved. Severe left-sided diverticulosis. Repeat 5 years.  Colonoscopy (SNOMED CT 480246850) performed by Jose Ham MD on 10/22/2008 at 63 Years.  Comments:  10/23/2013 10:41 Jose Pierce MD  1 adenoma., left-sided diverticulosis, rectal AVM. Repeat 5 years.  Colonoscopy (SNOMED CT 653470022) in  at 57 Years.  Lower Back Surgery.   Social History:        Alcohol Assessment: Past            Past, Beer (12 oz)      Tobacco Assessment: Past            Past, Pipe, Started age 12 Years.  Stopped age 43 Years.      Substance Abuse Assessment: Denies Substance Abuse            Never      Employment and Education Assessment            Retired, Work/School description: [employer].      Home and Environment Assessment            Marital status: .  Spouse/Partner name: Josey.  Lives with Spouse.  2 children.  Living situation:               Home/Independent.  Smoker in household: No.      Nutrition and Health Assessment            Type of diet: Regular.      Exercise and Physical Activity Assessment            Exercise frequency: Never.      Sexual Assessment            Sexually active: No.  Sexual orientation: Heterosexual.       Has no history of anemia.  Has no history of DVT or pulmonary embolism.  Has no personal history of bleeding problems.   Has no personal or family history  of anesthesia reactions.  Patient does not have active tuberculosis.    S/he has not taken aspirin or aspirin containing products in the last week.     S/he has not taken Plavix (Clopidogrel) in the last 2 weeks.    S/he has not taken warfarin in the past week.    S/he has not been on corticosteroids for more than 2 weeks recently.      S/he is not DNR before, during or after surgery.    Chest pain / SOB walking up 2 flights of steps? NO  Pain in neck or jaw? NO  CAD MI?  NO  Afib? NO  Heart Failure? NO  Asthma  or Bronchitis? NO   Diabetes? NO       Insulin/Orals?   Seizure Disorder? NO  CKD? NO  Thyroid Disease? NO  Liver Disease NO  CVA? NO         Physical Examination   Vital Signs   6/15/2017 8:54 AM CDT Temperature Temporal 98 DegF    Peripheral Pulse Rate 92 bpm    Pulse Site Radial artery    HR Method Manual    Systolic Blood Pressure 130 mmHg    Diastolic Blood Pressure 82 mmHg    Mean Arterial Pressure 98 mmHg    BP Site Left arm    BP Method Manual      General:  Alert and oriented, No acute distress.    Eye:  Normal conjunctiva.    HENT:  Normocephalic, Tympanic membranes are clear.    Neck:  Supple, Non-tender.    Respiratory:  Lungs are clear to auscultation, Respirations are non-labored.    Cardiovascular:  Normal rate, Regular rhythm.    Gastrointestinal:  Soft, Non-tender, Non-distended, Normal bowel sounds.    Musculoskeletal:  Normal range of motion, Normal strength.         Spine/torso exam: Lumbar ( Moderate, Tenderness ).    Integumentary:  Warm, Dry, Pink.    Neurologic:  Alert, Oriented, Normal sensory.    Psychiatric:  Cooperative, Appropriate mood & affect, Normal judgment, Non-suicidal.       Review / Management   ECG interpretation:  Time  6/15/2017 9:28:00 AM, Rate  90  beats per minute, Normal sinus rhythm, RSR(V1), Normal EKG.       Impression and Plan   Diagnosis     Preop testing (JRU83-JY Z01.818).     Chronic low back pain (NRV99-BD M54.5).     Condition:  Stable.    Orders      Orders   Lab (Gen Lab  Reference Lab):  Basic Metabolic Panel* ([facility]) (Order): Specimen Type: Serum, Collection Date: 6/15/2017 9:30 AM CDT  CBC (h/h, RBC, indices, WBC, Plt)* ([facility]) (Order): Specimen Type: Blood, Collection Date: 6/15/2017 9:30 AM CDT.     Diagnosis     Benign Essential Hypertension (AMZ29-HK I10).     CORBY (Obstructive Sleep Apnea) (KUF02-VR G47.33).     Course:  Progressing as expected.    Orders     Orders   Pharmacy:  lisinopril 10 mg oral tablet (Prescribe): 1 tab(s) ( 10 mg ), PO, Daily, # 90 tab(s), 3 Refill(s), Type: Maintenance, Pharmacy: [facility] #[id number], Needs appt for further refills, 1 tab(s) po daily  lisinopril 10 mg oral tablet (Modify): 1 tab(s) ( 10 mg ), PO, Daily, # 30 tab(s), 0 Refill(s), Type: Hard Stop, Pharmacy: [facility] #[id number], Needs appt for further refills.

## 2022-02-16 NOTE — NURSING NOTE
Phone Message    PCP:   CHT      Time of Call:  7:29 am       Person Calling:  pt  Phone number:  [phone]    Returned call at: 8:51 am    Note:   Pt has not heard about scheduling an appt with ortho. Gave pt # to referral dept, he will contact them.     Last office visit and reason:  4/5/17

## 2022-02-16 NOTE — PROGRESS NOTES
Patient:   ZHANE HOLBROOK            MRN: [id number]            FIN: [id number]               Age:   74 years     Sex:  Male     :  1945   Associated Diagnoses:   CORBY (Obstructive Sleep Apnea); Obese; Nonischemic cardiomyopathy; Chronic low back pain; Benign Essential Hypertension   Author:   Rizwan Noe PA-C      Report Summary   Diagnosis  CORBY (Obstructive Sleep Apnea) (PPF65-JP G47.33).  Patient InstructionsOrders   Visit Information   Visit type:  Scheduled follow-up.    Accompanied by:  No one.    Source of history:  Self.    Referral source:  Self.       Chief Complaint   Chief complaint and symptoms noted above confirmed with patient.      Interval History   Cardiomyopathy   Change in activity tolerance able to tolerate walking 1-2 blocks on the level and Chronic low back pain persists. Stable. No chest pain. No PIRES. CC above noted and confirmed with the patient..  The course is improving.  Compliance problems: not with diet.     On CPAP with good results         Review of Systems   Constitutional:  Negative.    Respiratory:  Negative except as documented in history of present illness.    Cardiovascular:  Negative except as documented in history of present illness.    Gastrointestinal:  Negative.    Musculoskeletal:  Joint pain, Decreased range of motion.         Back pain: Bilaterally, In the lower region.    Integumentary:  Negative.       Physical Examination   VS/Measurements   General:  Alert and oriented, No acute distress.    Eye:  Pupils are equal, round and reactive to light, Extraocular movements are intact, Normal conjunctiva.    HENT:  Normocephalic, Tympanic membranes are clear.    Neck:  Supple, Non-tender.    Respiratory:  Lungs are clear to auscultation, Respirations are non-labored.    Cardiovascular:  Normal rate, Regular rhythm, No murmur, No edema.    Gastrointestinal:  Soft, Non-tender, Non-distended.    Genitourinary:  No costovertebral angle tenderness.    Musculoskeletal:  Left  foot dorsiflexors 3.5/5. Rest of lower extremity 5/5 and symmetrical..    Integumentary:  No rash.       Impression and Plan   Diagnosis     CORBY (Obstructive Sleep Apnea) (YGQ83-PI G47.33).     Obese (YFI02-EJ E66.9).     Nonischemic cardiomyopathy (ZHQ34-IC I42.8).     Chronic low back pain (EEE20-UK M54.5).     Benign Essential Hypertension (KAL06-JT I10).     Patient Instructions:       Counseled: Patient, Regarding diagnosis, Regarding treatment, Regarding medications, Diet, Activity, Verbalized understanding.    Orders     Orders (Selected)   Outpatient Orders  Ordered (Dispatched)  Basic Metabolic Panel* ([facility]): Specimen Type: Serum, Collection Date: 07/25/19 10:19:00 CDT  CBC (h/h, RBC, indices, WBC, Plt)* ([facility]): Specimen Type: Blood, Collection Date: 07/25/19 10:19:00 CDT  Lipid panel with reflex to direct ldl* ([facility]): Specimen Type: Serum, Collection Date: 07/25/19 10:19:00 CDT  Prescriptions  Prescribed  atorvastatin 40 mg oral tablet: = 1 tab(s) ( 40 mg ), Oral, daily, # 90 tab(s), 3 Refill(s), Type: Maintenance, Pharmacy: [facility] #[id number], 1 tab(s) Oral daily  lisinopril 10 mg oral tablet: = 1 tab(s) ( 10 mg ), PO, Daily, # 90 tab(s), 3 Refill(s), Type: Maintenance, Pharmacy: [facility] #[id number], 1 tab(s) Oral daily  metoprolol succinate 100 mg oral tablet, extended release: = 1 tab(s) ( 100 mg ), po, daily, Instructions: [facility] 4/12/2018, # 90 tab(s), 3 Refill(s), Type: Maintenance, Pharmacy: [facility] #[id number], 1 tab(s) Oral daily,Instr:[facility] 4/12/2018.        Professional Services   Counseling Summary:  Time summary.    Counseling included treatment options, risks and benefits, lifestyle changes, medications, dose adjustments, and new diagnosis.    The patient was interactive, attentive, and asked questions.    Stool for blood. If negative, may discontinue screenings. If positive, refer for what would probably be one final colonoscopy.

## 2022-02-16 NOTE — NURSING NOTE
Comprehensive Intake Entered On:  3/9/2021 10:54 AM CST    Performed On:  3/9/2021 10:46 AM CST by Kailey Quijano CMA               Summary   Chief Complaint :   Sinus pressure and congestion starting 4 days ago.   Advance Directive :   No   Menstrual Status :   N/A   Weight Measured :   199 lb(Converted to: 199 lb 0 oz, 90.265 kg)    Height/Length Estimated :   67.5 in(Converted to: 5 ft 7 in, 171.45 cm)    Systolic Blood Pressure :   158 mmHg (HI)    Diastolic Blood Pressure :   94 mmHg (HI)    Mean Arterial Pressure :   115 mmHg   Peripheral Pulse Rate :   120 bpm (HI)    BP Site :   Right arm   BP Method :   Manual   Temperature Tympanic :   98.6 DegF(Converted to: 37.0 DegC)    Oxygen Saturation :   98 %   Kailey Quijano CMA - 3/9/2021 10:46 AM CST   Health Status   Allergies Verified? :   Yes   Medication History Verified? :   Yes   Immunizations Current :   Yes   Medical History Verified? :   Yes   Pre-Visit Planning Status :   Completed   Tobacco Use? :   Never smoker   Kailey Quijano CMA - 3/9/2021 10:46 AM CST   Consents   Consent for Immunization Exchange :   Consent Granted   Consent for Immunizations to Providers :   Consent Granted   Kailey Quijano CMA - 3/9/2021 10:46 AM CST   Meds / Allergies   (As Of: 3/9/2021 10:54:44 AM CST)   Allergies (Active)   No Known Medication Allergies  Estimated Onset Date:   Unspecified ; Created By:   Daina Alvarez CMA; Reaction Status:   Active ; Category:   Drug ; Substance:   No Known Medication Allergies ; Type:   Allergy ; Updated By:   Daina Alvarez CMA; Reviewed Date:   3/9/2021 10:50 AM CST        Medication List   (As Of: 3/9/2021 10:54:44 AM CST)   Prescription/Discharge Order    atorvastatin  :   atorvastatin ; Status:   Prescribed ; Ordered As Mnemonic:   atorvastatin 40 mg oral tablet ; Simple Display Line:   40 mg, 1 tab(s), Oral, daily, 90 tab(s), 3 Refill(s) ; Ordering Provider:   Rizwan Noe PA-C; Catalog Code:   atorvastatin ; Order Dt/Tm:   8/28/2020  9:08:41 AM CDT          diclofenac topical  :   diclofenac topical ; Status:   Prescribed ; Ordered As Mnemonic:   Voltaren Arthritis Pain 1% topical gel ; Simple Display Line:   2 gm, Topical, qid, 240 gm, 5 Refill(s) ; Ordering Provider:   Mike Strong MD; Catalog Code:   diclofenac topical ; Order Dt/Tm:   9/28/2020 9:43:49 AM CDT          fluticasone nasal  :   fluticasone nasal ; Status:   Prescribed ; Ordered As Mnemonic:   Flonase 50 mcg/inh nasal spray ; Simple Display Line:   2 spray(s), Nasal, daily, 1 EA, 11 Refill(s) ; Ordering Provider:   Jose Ham MD; Catalog Code:   fluticasone nasal ; Order Dt/Tm:   10/30/2020 4:07:20 PM CDT          lisinopril  :   lisinopril ; Status:   Prescribed ; Ordered As Mnemonic:   lisinopril 20 mg oral tablet ; Simple Display Line:   40 mg, 2 tab(s), Oral, daily, for 90 day(s), 180 tab(s), 3 Refill(s) ; Ordering Provider:   Mike Strong MD; Catalog Code:   lisinopril ; Order Dt/Tm:   11/27/2020 2:43:19 PM CST          meclizine  :   meclizine ; Status:   Prescribed ; Ordered As Mnemonic:   meclizine 25 mg oral tablet ; Simple Display Line:   25 mg, 1 tab(s), po, tid, PRN: as needed for dizziness, 270 tab(s), 3 Refill(s) ; Ordering Provider:   Rizwan Noe PA-C; Catalog Code:   meclizine ; Order Dt/Tm:   8/28/2020 9:08:41 AM CDT          metoprolol  :   metoprolol ; Status:   Prescribed ; Ordered As Mnemonic:   Metoprolol Succinate  mg oral tablet, extended release ; Simple Display Line:   100 mg, 1 tab(s), Oral, daily, 90 tab(s), 3 Refill(s) ; Ordering Provider:   Rizwan Noe PA-C; Catalog Code:   metoprolol ; Order Dt/Tm:   8/28/2020 9:08:39 AM CDT          Miscellaneous Rx Supply  :   Miscellaneous Rx Supply ; Status:   Prescribed ; Ordered As Mnemonic:   CPAP +7 cm H2o ; Simple Display Line:   See Instructions, Heated humidifier, heated tubing, filters, nasal or full face mask of choice with headgear.  Replacement cushions and supplies  as needed.  Months = 99 (Lifetime), 1 EA, 0 Refill(s) ; Ordering Provider:   Jose Ham MD; Catalog Code:   Miscellaneous Rx Supply ; Order Dt/Tm:   10/30/2020 4:00:50 PM CDT            ID Risk Screen   Recent Travel History :   No recent travel   Family Member Travel History :   No recent travel   Other Exposure to Infectious Disease :   Unknown   COVID-19 Testing Status :   No COVID-19 test performed   Kailey Quijano CMA - 3/9/2021 10:46 AM CST   Social History   Social History   (As Of: 3/9/2021 10:54:44 AM CST)   Alcohol:  Past      Quit 1969, Beer (12 oz)   (Last Updated: 9/18/2020 2:22:28 PM CDT by Ignacia Potter)          Tobacco:  Past      Past, Pipe, Started age 12 Years.  Stopped age 43 Years.   (Last Updated: 2/15/2012 11:41:22 AM CST by Janette Waite CMA)   Never (less than 100 in lifetime)   (Last Updated: 3/9/2021 10:46:48 AM CST by Kailey Quijano CMA)          Electronic Cigarette/Vaping:        Electronic Cigarette Use: Never.   (Last Updated: 3/9/2021 10:46:51 AM CST by Kailey Quijano CMA)          Substance Abuse:  Denies Substance Abuse      Never   (Last Updated: 11/18/2014 12:16:12 PM CST by Ignacia Potter)          Employment/School:        Retired, Work/School description: [employer].   (Last Updated: 6/16/2017 11:47:39 AM CDT by Samantha Moody)          Home/Environment:        Marital status: .  Spouse/Partner name: Josey.  Lives with Spouse.  2 children.  Living situation: Home/Independent.  Smoker in household: No.  Injuries/Abuse/Neglect in household: No.  Feels unsafe at home: No.  Family/Friends available for support: Yes.   (Last Updated: 9/18/2020 2:22:10 PM CDT by Ignacia Potter)          Nutrition/Health:        Type of diet: Regular.   (Last Updated: 2/15/2012 11:42:53 AM CST by Janette Waite CMA)          Exercise:        Exercise frequency: Never.   (Last Updated: 2/15/2012 11:43:05 AM CST by Janette Waite CMA)          Sexual:        Sexually active: No.  Sexual  orientation: Heterosexual.   (Last Updated: 11/18/2014 12:16:03 PM CST by Ignacia Potter)   Identifies as male   (Last Updated: 9/18/2020 2:22:52 PM CDT by Ignacia Potter)

## 2022-02-16 NOTE — NURSING NOTE
Comprehensive Intake Entered On:  10/2/2019 10:47 AM CDT    Performed On:  10/2/2019 10:42 AM CDT by Jennifer Miles CMA               Summary   Chief Complaint :   Remove Catheter- has had x 6 days   Menstrual Status :   N/A   Weight Measured :   192.6 lb(Converted to: 192 lb 10 oz, 87.36 kg)    Height Measured :   67.5 in(Converted to: 5 ft 7 in, 171.45 cm)    Body Mass Index :   29.72 kg/m2 (HI)    Body Surface Area :   2.04 m2   Systolic Blood Pressure :   116 mmHg   Diastolic Blood Pressure :   72 mmHg   Mean Arterial Pressure :   87 mmHg   Peripheral Pulse Rate :   95 bpm   BP Site :   Left arm   BP Method :   Manual   HR Method :   Electronic   Oxygen Saturation :   98 %   Jennifer Miles CMA - 10/2/2019 10:42 AM CDT   Health Status   Allergies Verified? :   Yes   Medication History Verified? :   Yes   Immunizations Current :   Yes   Medical History Verified? :   Yes   Pre-Visit Planning Status :   Completed   Tobacco Use? :   Never smoker   Jennifer Miles CMA - 10/2/2019 10:42 AM CDT   Consents   Consent for Immunization Exchange :   Consent Granted   Consent for Immunizations to Providers :   Consent Granted   Jennifer Miles CMA - 10/2/2019 10:42 AM CDT   Meds / Allergies   (As Of: 10/2/2019 10:47:17 AM CDT)   Allergies (Active)   No Known Medication Allergies  Estimated Onset Date:   Unspecified ; Created By:   Daina Alvarez CMA; Reaction Status:   Active ; Category:   Drug ; Substance:   No Known Medication Allergies ; Type:   Allergy ; Updated By:   Daina Alvarez CMA; Reviewed Date:   10/2/2019 10:42 AM CDT        Medication List   (As Of: 10/2/2019 10:47:17 AM CDT)   Prescription/Discharge Order    atorvastatin  :   atorvastatin ; Status:   Prescribed ; Ordered As Mnemonic:   atorvastatin 40 mg oral tablet ; Simple Display Line:   40 mg, 1 tab(s), Oral, daily, 90 tab(s), 3 Refill(s) ; Ordering Provider:   Rizwan Noe PA-C; Catalog Code:   atorvastatin ; Order Dt/Tm:   7/25/2019 10:32:18 AM           lisinopril  :   lisinopril ; Status:   Prescribed ; Ordered As Mnemonic:   lisinopril 10 mg oral tablet ; Simple Display Line:   10 mg, 1 tab(s), PO, Daily, 90 tab(s), 3 Refill(s) ; Ordering Provider:   Rizwan Noe PA-C; Catalog Code:   lisinopril ; Order Dt/Tm:   7/25/2019 10:32:36 AM          meclizine  :   meclizine ; Status:   Prescribed ; Ordered As Mnemonic:   meclizine 25 mg oral tablet ; Simple Display Line:   25 mg, 1 tab(s), po, tid, PRN: as needed for dizziness, 90 tab(s), 0 Refill(s) ; Ordering Provider:   Mike Strong MD; Catalog Code:   meclizine ; Order Dt/Tm:   6/25/2019 1:44:32 PM          metoprolol  :   metoprolol ; Status:   Prescribed ; Ordered As Mnemonic:   metoprolol succinate 100 mg oral tablet, extended release ; Simple Display Line:   50 mg, 0.5 tab(s), po, daily, [facility], 90 tab(s), 3 Refill(s) ; Ordering Provider:   Rizwan Noe PA-C; Catalog Code:   metoprolol ; Order Dt/Tm:   7/25/2019 10:32:37 AM          Miscellaneous Rx Supply  :   Miscellaneous Rx Supply ; Status:   Prescribed ; Ordered As Mnemonic:   CPAP +7 cm H2o ; Simple Display Line:   See Instructions, Heated humidifier, heated tubing, filters, nasal or full face mask of choice with headgear.  Replacement cushions and supplies as needed.  Months = 99 (Lifetime), 1 EA, 0 Refill(s) ; Ordering Provider:   Jose Ham MD; Catalog Code:   Miscellaneous Rx Supply ; Order Dt/Tm:   5/29/2018 9:03:09 AM

## 2022-02-16 NOTE — NURSING NOTE
Quick Intake Entered On:  5/14/2020 9:54 AM CDT    Performed On:  5/14/2020 9:53 AM CDT by Sada Villanueva RN               Summary   Menstrual Status :   N/A   Height Measured :   67.5 in(Converted to: 5 ft 7 in, 171.45 cm)    Systolic Blood Pressure :   141 mmHg (HI)    Diastolic Blood Pressure :   75 mmHg   Mean Arterial Pressure :   97 mmHg   Peripheral Pulse Rate :   55 bpm (LOW)    BP Method :   Electronic   Sada Villanueva RN - 5/14/2020 9:53 AM CDT   More Vitals   Systolic Blood Pressure Repeat :   142 mmHg   Diastolic Blood Pressure Repeat :   78 mmHg   Pulse Sitting :   58 bpm   Sada Villanueva RN - 5/14/2020 9:53 AM CDT

## 2022-02-16 NOTE — NURSING NOTE
Quick Intake Entered On:  5/8/2020 2:06 PM CDT    Performed On:  5/8/2020 2:05 PM CDT by Una Barton LPN               Summary   Chief Complaint :   BP check   Menstrual Status :   N/A   Height Measured :   67.5 in(Converted to: 5 ft 7 in, 171.45 cm)    Systolic Blood Pressure :   162 mmHg (HI)    Diastolic Blood Pressure :   78 mmHg   Mean Arterial Pressure :   106 mmHg   Peripheral Pulse Rate :   54 bpm (LOW)    BP Site :   Right arm   BP Method :   Electronic   HR Method :   Electronic   Una Barton LPN - 5/8/2020 2:05 PM CDT   More Vitals   Systolic Blood Pressure Repeat :   158 mmHg   Diastolic Blood Pressure Repeat :   80 mmHg   BP Site Repeat :   Right arm   Una Barton LPN - 5/8/2020 2:05 PM CDT

## 2022-02-16 NOTE — PROGRESS NOTES
Patient:   ZHANE HOLBROOK            MRN: [id number]            FIN: [id number]               Age:   72 years     Sex:  Male     :  1945   Associated Diagnoses:   Benign positional vertigo   Author:   Mike Strong MD      Chief Complaint   3/9/2018 8:58 AM CST     c/o vertigo; seen in ER on 18; given Rx of meclizine and still having vertigo     Chief complaint and symptoms noted above confirmed with patient.      History of Present Illness             The patient presents with vertigo.  The vertigo is described as feeling lightheaded and sensation of spinning.  The severity of the vertigo is moderate.  The vertigo is episodic.  The vertigo has lasted for 5 day(s).  Exacerbating factors consist of none.  Relieving factors consist of medication and rest.        Review of Systems   Constitutional:  Negative.    Ear/Nose/Mouth/Throat:  Negative except as documented in history of present illness.    Cardiovascular:  Negative.    Gastrointestinal:  Negative.       Health Status   Allergies:    Allergic Reactions (Selected)  No Known Medication Allergies   Problem list:    All Problems  Benign Essential Hypertension / SNOMED CT 6230704 / Confirmed  Chronic low back pain / SNOMED CT 109351876 / Confirmed  Colon Adenomas / ICD-9-.3 / Confirmed  Obese / ICD-9-.00 / Probable  CORBY (Obstructive Sleep Apnea) / SNOMED CT 9394456597 / Confirmed      Histories   Past Medical History:    Active  Colon Adenomas (ICD-9-.3)  Benign Essential Hypertension (SNOMED CT 4643196)  CORBY (Obstructive Sleep Apnea) (SNOMED CT 1995690599)  Chronic low back pain (SNOMED CT 489152103)   Family History:    Cancer  Brother ()     Procedure history:    Facetectomy of vertebra (SNOMED CT 1507099) performed by Efrain Ramos MD on 7/10/2017 at 72 Years.  Comments:  2017 2:23 PM - Ignacia Potter  Right L3-4.  Phacoemulsification (SNOMED CT 2546850357) performed by Edmond Munguia MD on 2015 at 69  Years.  Comments:  2/17/2015 7:56 AM - Ignacia Potter  Left.  Phacoemulsification (SNOMED CT 5460022754) performed by Edmond Munguia MD on 1/30/2015 at 69 Years.  Comments:  2/5/2015 9:51 AM - Ignacia Potter  Right.  Colonoscopy (SNOMED CT 393571284) performed by Jose Ham MD on 10/23/2013 at 68 Years.  Comments:  10/23/2013 10:42 AM - Jose Ham MD  Pedunculated polyp 25 cm, snared, not retrieved. Severe left-sided diverticulosis. Repeat 5 years.  Colonoscopy (SNOMED CT 831562622) performed by Jose Ham MD on 10/22/2008 at 63 Years.  Comments:  10/23/2013 10:41 AM - Jose Ham MD  1 adenoma., left-sided diverticulosis, rectal AVM. Repeat 5 years.  Colonoscopy (SNOMED CT 968084616) in 2002 at 57 Years.  Lower Back Surgery.      Physical Examination   Vital Signs   3/9/2018 8:58 AM CST Temperature Tympanic 98.4 DegF    Peripheral Pulse Rate 76 bpm    Pulse Site Radial artery    HR Method Manual    Systolic Blood Pressure 144 mmHg  HI    Diastolic Blood Pressure 86 mmHg  HI    Mean Arterial Pressure 105 mmHg    BP Site Left arm    BP Method Manual      Measurements from flowsheet : Measurements   3/9/2018 8:58 AM CST Height Measured - Standard 67.5 in    Weight Measured - Standard 205 lb    BSA 2.1 m2    Body Mass Index 31.63 kg/m2  HI      Documented vital signs:       Blood Pressure: Systolic  136  mmHg, Diastolic  85  mmHg.    General:  Alert and oriented, No acute distress.    Eye:  Pupils are equal, round and reactive to light, Extraocular movements are intact, Normal conjunctiva.    HENT:  Normocephalic, Tympanic membranes are clear, Normal hearing, Oral mucosa is moist.    Neck:  Supple, Non-tender.    Respiratory:  Lungs are clear to auscultation, Respirations are non-labored.    Cardiovascular:  Normal rate, Regular rhythm.       Impression and Plan   Diagnosis     Benign positional vertigo (ESQ20-SC H81.10).     Course:  Progressing as expected.    Plan:       Refer to: Declined PT.     Patient Instructions:       Counseled: Patient, Verbalized understanding.    Orders     Orders   Pharmacy:  meclizine 25 mg oral tablet (Prescribe): 1 tab(s) ( 25 mg ), po, tid, PRN: as needed for dizziness, # 30 tab(s), 1 Refill(s), Type: Maintenance, Pharmacy: [id number], 1 tab(s) po tid,PRN:as needed for dizziness  meclizine 25 mg oral tablet (Discontinue).

## 2022-02-16 NOTE — PROGRESS NOTES
Chief Complaint    sleep study results  History of Present Illness      Patient with severe sleep apnea currently not on CPAP here for follow-up of CPAP titration study.  Symptoms are unchanged and consist of profound daytime sleepiness snoring and witnessed apneas.  Review of Systems      No headache, chest pain, dyspnea, edema, weight change.  Physical Exam   Vitals & Measurements    HR: 79(Peripheral)  BP: 112/72       Patient appears comfortable and in no distress.  Assessment/Plan       Nonischemic cardiomyopathy(I42.8)        Stable         Orders:          Miscellaneous Rx Supply, CPAP +7 cm H2o, See Instructions, Instructions: Heated humidifier, heated tubing, filters, nasal or full face mask of choice with headgear. Replacement cushions and supplies as needed. Months = 99 (Lifetime), Supply, # 1 EA, 0 Refill(s), Type: Maint..., (Ordered)          75338 office outpatient visit 15 minutes (Charge), Quantity: 1, CORBY (Obstructive Sleep Apnea)  Nonischemic cardiomyopathy          Return to Clinic (Request), Return in 2-3 months       CORBY (Obstructive Sleep Apnea)(G47.33)        Severe with CPAP titration to 7 cm water pressure.        CPAP 7 cm water pressure, follow-up in 2 3 months.         Orders:          Miscellaneous Rx Supply, CPAP +7 cm H2o, See Instructions, Instructions: Heated humidifier, heated tubing, filters, nasal or full face mask of choice with headgear. Replacement cushions and supplies as needed. Months = 99 (Lifetime), Supply, # 1 EA, 0 Refill(s), Type: Maint..., (Ordered)          [id number] office outpatient visit 15 minutes (Charge), Quantity: 1, CORBY (Obstructive Sleep Apnea)  Nonischemic cardiomyopathy          Return to Clinic (Request), Return in 2-3 months  Patient Information     Name:ZHANE HOLBROOK      Address:      [address]      Sex:Male      YOB: 1945      [phone]     MRN:[id number]     FIN:[id number]     Location:[facility]     Date  of Service:05/29/2018      Primary Care Physician:       Mike Strong MD, [phone]      Attending Physician:       Jose Ham MD, [phone]  Problem List/Past Medical History    Ongoing     Benign Essential Hypertension     Chronic low back pain     Colon Adenomas     Nonischemic cardiomyopathy     Obese     CORBY (Obstructive Sleep Apnea)       Comments: On 5/16/18 CPAP titration to 7. On 4/30/14 severe CORBY with AHI 81 ans oximetry naidir 64%. Optimal PAP presure not found.    Historical     Inpatient stay       Comments: [facility] - Chest pain.  Procedure/Surgical History     Facetectomy of vertebra (07/10/2017)     Phacoemulsification (02/12/2015)     Phacoemulsification (01/30/2015)     Colonoscopy (10/23/2013)     Colonoscopy (10/22/2008)     Colonoscopy (2002)     Lower Back Surgery  Medications        aspirin 81 mg oral tablet: 81 mg, 1 tab(s), po, daily, Per [facility] 4/12/2018, 0 Refill(s).        latanoprost 0.005% ophthalmic solution: 1 drop(s), both eyes, qpm, 0 Refill(s).        metoprolol succinate 100 mg oral tablet, extended release: 100 mg, 1 tab(s), po, daily, Per [facility] 4/12/2018, 90 tab(s), 3 Refill(s).        atorvastatin 40 mg oral tablet: 40 mg, 1 tab(s), po, daily, Per [facility] 4/12/2018, 90 tab(s), 3 Refill(s).        lisinopril 10 mg oral tablet: 10 mg, 1 tab(s), PO, Daily, 90 tab(s), 3 Refill(s).        meclizine 25 mg oral tablet: 25 mg, 1 tab(s), po, tid, PRN: as needed for dizziness, 30 tab(s), 2 Refill(s).        CPAP +7 cm H2o: See Instructions, Heated humidifier, heated tubing, filters, nasal or full face mask of choice with headgear.  Replacement cushions and supplies as needed.  Months = 99 (Lifetime), 1 EA, 0 Refill(s).                Allergies    No Known Medication Allergies  Social History    Smoking Status - 05/29/2018     Former smoker     Alcohol - Past, 02/15/2012      Past, Beer (12 oz), 02/15/2012     Employment and Education      Retired,  Work/School description: [employer] 06/16/2017     Exercise and Physical Activity      Exercise frequency: Never., 02/15/2012     Home and Environment      Marital status: . Spouse/Partner name: Josey. Lives with Spouse. 2 children. Living situation: Home/Independent. Smoker in household: No., 02/15/2012     Nutrition and Health      Type of diet: Regular., 02/15/2012     Sexual      Sexually active: No. Sexual orientation: Heterosexual., 11/18/2014     Substance Abuse - Denies Substance Abuse, 11/18/2014      Never, 11/18/2014     Tobacco - Past, 03/28/2013      Past, Pipe, Started age 12 Years. Stopped age 43 Years., 02/15/2012  Family History    Cancer: Brother.  Immunizations      Vaccine Date Status      ZOS, shingles 09/28/2015 Recorded      pneumococcal (PCV13) 08/18/2015 Given      influenza virus vaccine, inactivated 09/25/2009 Recorded      Td 05/06/2005 Recorded  Lab Results          Lab Results (Last 4 results within 90 days)           Sodium Level: 137 mmol/L [135 mmol/L - 146 mmol/L] (04/18/18 15:56:00)          Potassium Level: 4.4 mmol/L [3.5 mmol/L - 5.3 mmol/L] (04/18/18 15:56:00)          Potassium Level TR: 4 mEq/L [6  - 22] (04/12/18 09:36:00)          Chloride Level: 102 mmol/L [98 mmol/L - 110 mmol/L] (04/18/18 15:56:00)          CO2 Level: 28 mmol/L [20 mmol/L - 31 mmol/L] (04/18/18 15:56:00)          Glucose Level: 83 mg/dL [65 mg/dL - 99 mg/dL] (04/18/18 15:56:00)          BUN: 28 mg/dL High [7 mg/dL - 25 mg/dL] (04/18/18 15:56:00)          Creatinine Level: 1.73 mg/dL High [0.7 mg/dL - 1.18 mg/dL] (04/18/18 15:56:00)          BUN/Creat Ratio: 16 [6  - 22] (04/18/18 15:56:00)          eGFR: 38 mL/min/1.73m2 Low [8.6 mg/dL - 10.3 mg/dL] (04/18/18 15:56:00)          eGFR : 44 mL/min/1.73m2 Low [6  - 22] (04/18/18 15:56:00)          Calcium Level: 9.5 mg/dL [8.6 mg/dL - 10.3 mg/dL] (04/18/18 15:56:00)          Magnesium Level TR: 2.1 mg/dL [6  - 22] (04/12/18  09:36:00)          B-Natriuretic Peptide (BNP): 123 pg/mL High [6  - 22] (04/18/18 15:56:00)          Hgb TR: 14.5 g/dL [6  - 22] (04/12/18 09:36:00)          MCV TR: 87 fL [6  - 22] (04/12/18 09:36:00)          Platelet TR: 227 x10^3/uL [6  - 22] (04/12/18 09:36:00)          MPV (Mean Platelet Volume) TR: 11.1 fL [20 mmol/L - 31 mmol/L] (04/12/18 09:36:00)  Diagnostic Results   CPAP titration study to 7 cm of water pressure

## 2022-02-16 NOTE — NURSING NOTE
Comprehensive Intake Entered On:  7/25/2019 10:17 AM CDT    Performed On:  7/25/2019 10:11 AM CDT by Liza Stone MA               Summary   Chief Complaint :   HTN med check and Fasting for labs    Menstrual Status :   N/A   Weight Measured :   202.2 lb(Converted to: 202 lb 3 oz, 91.72 kg)    Height Measured :   67.5 in(Converted to: 5 ft 7 in, 171.45 cm)    Body Mass Index :   31.2 kg/m2 (HI)    Body Surface Area :   2.09 m2   Systolic Blood Pressure :   132 mmHg (HI)    Diastolic Blood Pressure :   80 mmHg   Mean Arterial Pressure :   97 mmHg   Peripheral Pulse Rate :   80 bpm   BP Site :   Left arm   Pulse Site :   Radial artery   BP Method :   Manual   HR Method :   Manual   Temperature Tympanic :   98.6 DegF(Converted to: 37.0 DegC)    Liza Stone MA - 7/25/2019 10:11 AM CDT   Health Status   Allergies Verified? :   Yes   Medication History Verified? :   Yes   Immunizations Current :   Yes   Medical History Verified? :   Yes   Pre-Visit Planning Status :   Completed   Tobacco Use? :   Never smoker   Liza Stone MA - 7/25/2019 10:11 AM CDT   Consents   Consent for Immunization Exchange :   Consent Granted   Consent for Immunizations to Providers :   Consent Granted   Liza Stone MA - 7/25/2019 10:11 AM CDT   Meds / Allergies   (As Of: 7/25/2019 10:17:28 AM CDT)   Allergies (Active)   No Known Medication Allergies  Estimated Onset Date:   Unspecified ; Created By:   Daina Alvarez CMA; Reaction Status:   Active ; Category:   Drug ; Substance:   No Known Medication Allergies ; Type:   Allergy ; Updated By:   Daina Alvarez CMA; Reviewed Date:   7/25/2019 10:15 AM CDT        Medication List   (As Of: 7/25/2019 10:17:28 AM CDT)   Prescription/Discharge Order    lisinopril  :   lisinopril ; Status:   Prescribed ; Ordered As Mnemonic:   lisinopril 10 mg oral tablet ; Simple Display Line:   10 mg, 1 tab(s), PO, Daily, 30 tab(s), 0 Refill(s) ; Ordering Provider:   Mike Strong MD; Catalog Code:    lisinopril ; Order Dt/Tm:   7/24/2019 2:44:10 PM          meclizine  :   meclizine ; Status:   Prescribed ; Ordered As Mnemonic:   meclizine 25 mg oral tablet ; Simple Display Line:   25 mg, 1 tab(s), po, tid, PRN: as needed for dizziness, 90 tab(s), 0 Refill(s) ; Ordering Provider:   Mike Strong MD; Catalog Code:   meclizine ; Order Dt/Tm:   6/25/2019 1:44:32 PM          lisinopril  :   lisinopril ; Status:   Completed ; Ordered As Mnemonic:   lisinopril 10 mg oral tablet ; Simple Display Line:   10 mg, 1 tab(s), PO, Daily, 30 tab(s), 0 Refill(s) ; Ordering Provider:   Mike Strong MD; Catalog Code:   lisinopril ; Order Dt/Tm:   6/25/2019 1:44:31 PM          metoprolol  :   metoprolol ; Status:   Prescribed ; Ordered As Mnemonic:   metoprolol succinate 100 mg oral tablet, extended release ; Simple Display Line:   100 mg, 1 tab(s), po, daily, Per [facility] 4/12/2018, 90 tab(s), 3 Refill(s) ; Ordering Provider:   Mike Strong MD; Catalog Code:   metoprolol ; Order Dt/Tm:   7/27/2018 8:27:53 AM          Miscellaneous Rx Supply  :   Miscellaneous Rx Supply ; Status:   Prescribed ; Ordered As Mnemonic:   CPAP +7 cm H2o ; Simple Display Line:   See Instructions, Heated humidifier, heated tubing, filters, nasal or full face mask of choice with headgear.  Replacement cushions and supplies as needed.  Months = 99 (Lifetime), 1 EA, 0 Refill(s) ; Ordering Provider:   Jose Ham MD; Catalog Code:   Miscellaneous Rx Supply ; Order Dt/Tm:   5/29/2018 9:03:09 AM

## 2022-02-16 NOTE — NURSING NOTE
Comprehensive Intake Entered On:  12/21/2021 3:21 PM CST    Performed On:  12/21/2021 3:13 PM CST by Keerthi Nguyễn               Summary   Chief Complaint :   Medical f/u, medication refill    Advance Directive :   No   Menstrual Status :   N/A   Weight Measured :   194.4 lb(Converted to: 194 lb 6 oz, 88.178 kg)    Height Measured :   67 in(Converted to: 5 ft 7 in, 170.18 cm)    Body Mass Index :   30.44 kg/m2 (HI)    Body Surface Area :   2.04 m2   Height/Length Estimated :   67.5 in(Converted to: 5 ft 7 in, 171.45 cm)    Systolic Blood Pressure :   156 mmHg (HI)    Diastolic Blood Pressure :   67 mmHg   Mean Arterial Pressure :   97 mmHg   Peripheral Pulse Rate :   76 bpm   BP Site :   Right arm   BP Method :   Electronic   HR Method :   Electronic   Temperature Tympanic :   98.2 DegF(Converted to: 36.8 DegC)    Keerthi Nguyễn - 12/21/2021 3:13 PM CST   Health Status   Allergies Verified? :   Yes   Medication History Verified? :   Yes   Immunizations Current :   Yes   Medical History Verified? :   Yes   Pre-Visit Planning Status :   Completed   Tobacco Use? :   Never smoker   Keerthi Nguyễn - 12/21/2021 3:13 PM CST   Consents   Consent for Immunization Exchange :   Consent Granted   Consent for Immunizations to Providers :   Consent Granted   Keerthi Nguyễn - 12/21/2021 3:13 PM CST   Meds / Allergies   (As Of: 12/21/2021 3:21:02 PM CST)   Allergies (Active)   No Known Medication Allergies  Estimated Onset Date:   Unspecified ; Created By:   Daina Alvarez CMA; Reaction Status:   Active ; Category:   Drug ; Substance:   No Known Medication Allergies ; Type:   Allergy ; Updated By:   Daina Alvarez CMA; Reviewed Date:   12/21/2021 3:18 PM CST        Medication List   (As Of: 12/21/2021 3:21:02 PM CST)   Prescription/Discharge Order    atorvastatin  :   atorvastatin ; Status:   Prescribed ; Ordered As Mnemonic:   atorvastatin 40 mg oral tablet ; Simple Display Line:   1 tab(s), Oral, daily, 90 tab(s), 3  Refill(s) ; Ordering Provider:   Rizwan Noe PA-C; Catalog Code:   atorvastatin ; Order Dt/Tm:   8/20/2021 8:59:35 AM CDT          azithromycin  :   azithromycin ; Status:   Prescribed ; Ordered As Mnemonic:   azithromycin 250 mg oral tablet ; Simple Display Line:   1 packet(s), PO, Once, as directed on package labeling, 6 tab(s), 0 Refill(s) ; Ordering Provider:   Mike Strong MD; Catalog Code:   azithromycin ; Order Dt/Tm:   3/9/2021 11:08:11 AM CST          diclofenac topical  :   diclofenac topical ; Status:   Prescribed ; Ordered As Mnemonic:   Voltaren Arthritis Pain 1% topical gel ; Simple Display Line:   2 gm, Topical, qid, 240 gm, 5 Refill(s) ; Ordering Provider:   Mike Strong MD; Catalog Code:   diclofenac topical ; Order Dt/Tm:   9/28/2020 9:43:49 AM CDT          fluticasone nasal  :   fluticasone nasal ; Status:   Prescribed ; Ordered As Mnemonic:   Flonase 50 mcg/inh nasal spray ; Simple Display Line:   2 spray(s), Nasal, daily, 1 EA, 11 Refill(s) ; Ordering Provider:   Jose Ham MD; Catalog Code:   fluticasone nasal ; Order Dt/Tm:   10/30/2020 4:07:20 PM CDT          fluticasone nasal  :   fluticasone nasal ; Status:   Prescribed ; Ordered As Mnemonic:   fluticasone 50 mcg/inh nasal spray ; Simple Display Line:   See Instructions, 2 spray(s)   in each nostril daily, 15.8 mL, 5 Refill(s) ; Ordering Provider:   Mike Strong MD; Catalog Code:   fluticasone nasal ; Order Dt/Tm:   3/9/2021 11:05:52 AM CST          lisinopril  :   lisinopril ; Status:   Prescribed ; Ordered As Mnemonic:   lisinopril 20 mg oral tablet ; Simple Display Line:   2 tab(s), Oral, daily, 60 tab(s), 0 Refill(s) ; Ordering Provider:   Mike Strong MD; Catalog Code:   lisinopril ; Order Dt/Tm:   11/30/2021 12:01:53 PM CST          meclizine  :   meclizine ; Status:   Prescribed ; Ordered As Mnemonic:   meclizine 25 mg oral tablet ; Simple Display Line:   25 mg, 1 tab(s), po, tid, PRN: as  needed for dizziness, 270 tab(s), 3 Refill(s) ; Ordering Provider:   Rizwan Noe PA-C; Catalog Code:   meclizine ; Order Dt/Tm:   8/28/2020 9:08:41 AM CDT          metoprolol  :   metoprolol ; Status:   Prescribed ; Ordered As Mnemonic:   Metoprolol Succinate  mg oral tablet, extended release ; Simple Display Line:   1 tab(s), Oral, daily, 90 tab(s), 3 Refill(s) ; Ordering Provider:   Rizwan Noe PA-C; Catalog Code:   metoprolol ; Order Dt/Tm:   11/11/2021 8:24:16 AM CST          Miscellaneous Rx Supply  :   Miscellaneous Rx Supply ; Status:   Prescribed ; Ordered As Mnemonic:   CPAP +7 cm H2o ; Simple Display Line:   See Instructions, Heated humidifier, heated tubing, filters, nasal or full face mask of choice with headgear.  Replacement cushions and supplies as needed.  Months = 99 (Lifetime), 1 EA, 0 Refill(s) ; Ordering Provider:   Jose Ham MD; Catalog Code:   Miscellaneous Rx Supply ; Order Dt/Tm:   10/30/2020 4:00:50 PM CDT          predniSONE  :   predniSONE ; Status:   Prescribed ; Ordered As Mnemonic:   predniSONE 20 mg oral tablet ; Simple Display Line:   40 mg, 2 tab(s), PO, Daily, for 5 day(s), 10 tab(s), 0 Refill(s) ; Ordering Provider:   Mike Strong MD; Catalog Code:   predniSONE ; Order Dt/Tm:   6/29/2021 12:05:28 PM CDT

## 2022-02-16 NOTE — PROGRESS NOTES
Patient:   ZHANE HOLBROOK            MRN: [id number]            FIN: [id number]               Age:   75 years     Sex:  Male     :  1945   Associated Diagnoses:   Nonischemic cardiomyopathy; Benign Essential Hypertension; Bilateral shoulder pain; Bilateral hand pain; Obese; Chronic low back pain; CORBY (Obstructive Sleep Apnea)   Author:   Rizwan Noe PA-C      Visit Information      Care Providers  Not recorded for selected visit.  Ancillary Services  Not recorded for selected visit.          Current Visit Date:  2020  Initial AWV Date:  2017          Chief Complaint   Chief complaint and symptoms noted above confirmed with patient.      History of Present Illness             The patient presents for Medicare annual wellness exam.  The patient's general health status is described as unchanged and stable.  The patient's diet is described as balanced.  Exercise occasional.  Associated symptoms consist of denies shortness of breath and denies weight loss.     Shoulder and hand stiffness and soreness. Chronic low back pain. Tolerates medications well. Early morning stiffness      Review of Systems   Constitutional:  Negative except as documented in history of present illness.    Eye:  Negative.    Ear/Nose/Mouth/Throat:  Decreased hearing, Hearing is evaluated.    See completed Health History for Review of Systems   Respiratory:  Apnea.    Cardiovascular:  Negative.    Gastrointestinal:  Negative.    Genitourinary:  Was followed by urology but has not seen in awhile.    Endocrine:  Negative.    Musculoskeletal:  Negative except as documented in history of present illness.    Integumentary:  Negative.    Neurologic:  Negative.    Psychiatric:  Negative.       Health Status   Allergies:    Allergic Reactions (Selected)  No Known Medication Allergies   Medications:  (Selected)   Prescriptions  Prescribed  CPAP +7 cm H2o: CPAP +7 cm H2o, See Instructions, Instructions: Heated humidifier, heated tubing,  filters, nasal or full face mask of choice with headgear.  Replacement cushions and supplies as needed.  Months = 99 (Lifetime), Supply, # 1 EA, 0 Refill(s), Type: Maint...  Metoprolol Succinate  mg oral tablet, extended release: = 1 tab(s) ( 100 mg ), Oral, daily, # 90 tab(s), 3 Refill(s), Type: Maintenance, Pharmacy: [facility] , 1 tab(s) Oral daily,x90 day(s)  atorvastatin 40 mg oral tablet: = 1 tab(s) ( 40 mg ), Oral, daily, # 90 tab(s), 3 Refill(s), Type: Maintenance, Pharmacy: [facility] #[id number], 1 tab(s) Oral daily  lisinopril 20 mg oral tablet: = 1 tab(s) ( 20 mg ), Oral, daily, # 90 tab(s), 3 Refill(s), Type: Maintenance, Pharmacy: [facility] , 1 tab(s) Oral daily  meclizine 25 mg oral tablet: = 1 tab(s) ( 25 mg ), po, tid, PRN: as needed for dizziness, # 90 tab(s), 0 Refill(s), Type: Maintenance, Pharmacy: [facility] , 1 tab(s) Oral tid,PRN:as needed for dizziness, 67.5, in, 05/14/20 9:53:00 CDT, Height Measured, 19...   Problem list:    All Problems (Selected)  CORBY (Obstructive Sleep Apnea) / SNOMED CT 9733913441 / Confirmed  Obese / ICD-9-.00 / Probable  Nonischemic cardiomyopathy / SNOMED CT 537551106 / Confirmed  Colon adenomas / SNOMED CT 3300410680 / Confirmed  Chronic low back pain / SNOMED CT 070655578 / Confirmed  Benign Essential Hypertension / SNOMED CT 6221842 / Confirmed   Medicare Assessments      Merino Fall Risk  (08/28/2020 08:39 am)       History of Fall in Last 3 Months Merino:  No     Functional Assessment  (08/28/2020 08:39 am)       Bathing ADL Index:  Independent (2)       Dressing ADL Index:  Independent (2)       Toileting ADL Index:  Independent (2)       Transferring Bed or Chair ADL Index:  Independent (2)       Feeding ADL Index:  Independent (2)     Depression Screening  Not recorded for selected visit.    Detailed Depression Screening  Not recorded for selected visit.    Geriatric  Depression Screen  (08/28/2020 08:39 am)       Geriatric Depression Satisfied Life:  Yes       Geriatric Depression Dropped Activities:  No       Geriatric Depression Life Empty:  No       Geriatric Depression Bored:  No       Geriatric Depression Good Spirits:  Yes       Geriatric Depression Afraid Bad Things:  No       Geriatric Depression Feel Happy:  Yes       Geriatric Depression Feel Helpless:  No       Geriatric Depression Prefer to Stay Home:  Yes       Geriatric Depression Memory Problems:  No       Geriatric Depression Wonderful Be Alive:  Yes       Geriatric Depression Feel Worthless:  No       Geriatric Depression Situation Hopeless:  No       Geriatric Depression Others Better Off:  No       Geriatric Depression Full of Energy:  Yes       Geriatric Depression Total Score:  1     Hearing Screen  (08/28/2020 08:39 am)       Hearing Screen Comments:  Pt has hearing aids     Home Safety Screen  Not recorded for selected visit.     Vision Screening  Vision Screen Comments: Sees eye doctor yearly- Dr. Munguia (08/28/20 08:39:00)      Histories   Past Medical History:    Active  CORBY (Obstructive Sleep Apnea) (1602479241): Onset on 4/30/2014 at 69 years.  Comments:  4/20/2018 CDT 11:46 AM [facility] - Jose Ham MD  On 4/30/14 severe CORBY with AHI 81 ans oximetry naidir 64%. Optimal PAP presure not found.    5/29/2018 CDT 8:59 AM [facility] - Jose Ham MD  On 5/16/18 CPAP titration to 7.  Colon adenomas (3129039003)  Benign Essential Hypertension (2429955)  Chronic low back pain (964328487)  Resolved  Inpatient stay (725064428): Onset on 12/31/2018 at 73 years.  Resolved on 1/2/2019 at 73 years.  Comments:  1/3/2019 CST 10:04 AM CST - Agnes Barth  @[facility], [place] - Left knee hemiarthroplasty  Inpatient stay (795862292): Onset on 4/9/2018 at 73 years.  Resolved on 4/12/2018 at 73 years.  Comments:  4/19/2018 CDT 8:24 AM CDT - Ignacia Potter  [facility] - Chest pain.   Family History:     Cancer  Brother ()     Procedure history:    Arthroplasty (4404974469) on 2018 at 73 Years.  Comments:  1/3/2019 10:07 AM GOPAL Barth Agnes  left knee hemiarthroplasty  Facetectomy of vertebra (1334840) on 7/10/2017 at 72 Years.  Comments:  2017 2:23 PM Ignacia Sage  Right L3-4.  Phacoemulsification (8108869467) on 2015 at 69 Years.  Comments:  2015 7:56 AM Ignacia Parsons  Left.  Phacoemulsification (1005247680) on 2015 at 69 Years.  Comments:  2015 9:51 AM Ignacia Parsons  Right.  Colonoscopy ([id number]) on 10/23/2013 at 68 Years.  Comments:  10/23/2013 10:42 AM Jose Rivero MD  Pedunculated polyp 25 cm, snared, not retrieved. Severe left-sided diverticulosis. Repeat 5 years.  Colonoscopy ([id number]) on 10/22/2008 at 63 Years.  Comments:  10/23/2013 10:41 AM Jose Rivero MD  1 adenoma., left-sided diverticulosis, rectal AVM. Repeat 5 years.  Colonoscopy ([id number]) in  at 57 Years.  Lower Back Surgery.   Social History:        Alcohol Assessment: Past            Past, Beer (12 oz)      Tobacco Assessment: Past            Past, Pipe, Started age 12 Years.  Stopped age 43 Years.      Substance Abuse Assessment: Denies Substance Abuse            Never      Employment and Education Assessment            Retired, Work/School description: [employer].      Home and Environment Assessment            Marital status: .  Spouse/Partner name: Josey.  Lives with Spouse.  2 children.  Living situation:               Home/Independent.  Smoker in household: No.      Nutrition and Health Assessment            Type of diet: Regular.      Exercise and Physical Activity Assessment            Exercise frequency: Never.      Sexual Assessment            Sexually active: No.  Sexual orientation: Heterosexual.        Physical Examination   Documented vital signs:       Blood Pressure: Systolic  138  mmHg, Diastolic  82  mmHg.    General:  Alert and  oriented, No acute distress.    Eye:  Pupils are equal, round and reactive to light, Normal conjunctiva.    HENT:  Tympanic membranes are clear, Oral mucosa is moist, Failed hearing Screen after flushing ears..    Neck:  Supple, Non-tender, No carotid bruit, No lymphadenopathy.    Respiratory:  Respirations are non-labored.    Cardiovascular:  Normal rate, Regular rhythm, No edema.    Gastrointestinal:  Soft, Non-tender, Non-distended.    Musculoskeletal:  Normal range of motion, No swelling, Normal gait.    Integumentary:  Warm, No rash.    Neurologic:  Alert, Oriented, No focal deficits.    Cognition and Speech:  Oriented, Speech clear and coherent, Functional cognition intact.    Psychiatric:  Cooperative, Appropriate mood & affect, Normal judgment, Patient's GDS and CAGE questionnaire reviewed and discussed with patient. .       Impression and Plan   Diagnosis     Nonischemic cardiomyopathy (KUW39-YP I42.8).     Benign Essential Hypertension (GOW82-TK I10).     Bilateral shoulder pain (XXN73-AP M25.511).     Bilateral hand pain (WPD58-WF M79.641).     Obese (ATT79-MD E66.9).     Chronic low back pain (VYL30-YN M54.5).     CORBY (Obstructive Sleep Apnea) (GOJ42-UM G47.33).     Course:  Progressing as expected.    Orders     Orders (Selected)   Outpatient Orders  Ordered  XR Hand Min 3 Views Left (Request): Priority: Routine, Bilateral shoulder pain  Bilateral hand pain  Chronic low back pain  XR Hand Min 3 Views Right (Request): Priority: Routine, Bilateral shoulder pain  Bilateral hand pain  Chronic low back pain  XR Shoulder Left (Request): Priority: Routine, Bilateral shoulder pain  Bilateral hand pain  Chronic low back pain  XR Shoulder Right (Request): Priority: Routine, Bilateral shoulder pain  Bilateral hand pain  Chronic low back pain  Ordered (Dispatched)  MEENA Screen, IFA, with Reflex to Titer and Pattern* ([facility]): Specimen Type: Serum, Collection Date: 08/28/20 8:57:00 CDT  Basic Metabolic Panel*  ([facility]): Specimen Type: Serum, Collection Date: 08/28/20 8:44:00 CDT  C-Reactive Protein* ([facility]): Specimen Type: Serum, Collection Date: 08/28/20 8:57:00 CDT  CBC (h/h, RBC, indices, WBC, Plt)* ([facility]): Specimen Type: Blood, Collection Date: 08/28/20 8:44:00 CDT  Cyclic citrullinated peptide (ccp) ab (igg)* ([facility]): Specimen Type: Serum, Collection Date: 08/28/20 8:57:00 CDT  Lipid panel with reflex to direct ldl* ([facility]): Specimen Type: Serum, Collection Date: 08/28/20 8:44:00 CDT  Rheumatoid factor* ([facility]): Specimen Type: Serum, Collection Date: 08/28/20 8:57:00 CDT  Completed  81696 periodic preventive med est patient 65yrs+> (Charge): Quantity: 1, Nonischemic cardiomyopathy  Benign Essential Hypertension  Bilateral shoulder pain  Bilateral hand pain  Obese  Chronic low back pain  CORBY (Obstructive Sleep Apnea).

## 2022-02-16 NOTE — PROGRESS NOTES
Patient:   ZHANE HOLBROOK            MRN: [id number]            FIN: [id number]               Age:   75 years     Sex:  Male     :  1945   Associated Diagnoses:   Maxillary sinusitis; Low back pain   Author:   Mike Strong MD      Visit Information      Date of Service: 2021 10:33 am  Performing Location: [facility]  Encounter#: [id number]      Primary Care Provider (PCP):  Mike Strong MD    NPI# [id number]      Referring Provider:  Mike Strong MD    NPI# [id number]   Source of history:  Self.    Referral source   History limitation:  None.       Chief Complaint   3/9/2021 10:46 AM CST    Sinus pressure and congestion starting 4 days ago.     Chief complaint and symptoms noted above confirmed with patient.   Also interested in getting second opinion on back pain      History of Present Illness             The patient presents with facial pain.  The location is around the nose and around the mouth.  The onset was gradual.  There were exacerbating factors including blowing nose.  Radiation around the mouth.  The severity is moderate.  The symptom occurs intermittently.  The course is worsening.  The effect on daily activities is no change in activity level and no change in sleeping patterns.               The patient presents with.               The patient presents with nasal congestion.  There were exacerbating factors including allergen exposure.  It is described as a sensation of pressure and feeling plugged.  The symptom occurs constantly.               The patient presents with rhinorrhea.        Review of Systems   Constitutional:  Negative.    Respiratory:  Negative.    Cardiovascular:  Negative.       Health Status   Allergies:    Allergic Reactions (Selected)  No Known Medication Allergies   Medications:  (Selected)   Prescriptions  Prescribed  CPAP +7 cm H2o: CPAP +7 cm H2o, See Instructions, Instructions: Heated humidifier, heated tubing, filters, nasal  or full face mask of choice with headgear.  Replacement cushions and supplies as needed.  Months = 99 (Lifetime), Supply, # 1 EA, 0 Refill(s), Type: Maint...  Flonase 50 mcg/inh nasal spray: = 2 spray(s), Nasal, daily, # 1 EA, 11 Refill(s), Type: Maintenance, Pharmacy: [facility], 2 spray(s) Nasal daily, 67.5, in, 10/30/20 15:59:00 CDT, Height Measured, 193, lb, 10/30/20 15:59:00...  Metoprolol Succinate  mg oral tablet, extended release: = 1 tab(s) ( 100 mg ), Oral, daily, # 90 tab(s), 3 Refill(s), Type: Maintenance, Pharmacy: [facility], 1 tab(s) Oral daily, 67.5, in, 08/28/20 8:39:00 CDT, Height Measured, 194, lb, 08/28/20...  Voltaren Arthritis Pain 1% topical gel: ( 2 gm ), Topical, qid, # 240 gm, 5 Refill(s), Type: Maintenance, Pharmacy: [facility], 2 gm Topical qid, 67.5, in, 09/28/20 8:49:00 CDT, Height Measured, 195, lb, 09/28/20 8:49:00 CDT, Weigh...  atorvastatin 40 mg oral tablet: = 1 tab(s) ( 40 mg ), Oral, daily, # 90 tab(s), 3 Refill(s), Type: Maintenance, Pharmacy: [facility], 1 tab(s) Oral daily, 67.5, in, 08/28/20 8:39:00 CDT, Height Measured, 194, lb, 08/28/20 8...  lisinopril 20 mg oral tablet: = 2 tab(s) ( 40 mg ), Oral, daily, # 180 tab(s), 3 Refill(s), Type: Maintenance, Pharmacy: [facility], 2 tab(s) Oral daily,x90 day(s), 67.5, in, 10/30/20 15:59:00 CDT, Height Measured, 193, l...  meclizine 25 mg oral tablet: = 1 tab(s) ( 25 mg ), po, tid, PRN: as needed for dizziness, # 270 tab(s), 3 Refill(s), Type: Maintenance, Pharmacy: [facility], 1 tab(s) Oral tid,PRN:as needed for dizziness, 67.5, in, 08/28...   Problem list:    All  Problems  Allergic rhinitis / SNOMED CT 098941633 / Confirmed  CORBY (Obstructive Sleep Apnea) / SNOMED CT 2380019589 / Confirmed  Nonischemic cardiomyopathy / SNOMED CT 878355337 / Confirmed  Colon adenomas / SNOMED CT 5421032773 / Confirmed  Obesity / SNOMED CT 0246039279 / Probable  Benign Essential Hypertension / SNOMED CT 3445966 / Confirmed  Chronic low back pain / SNOMED CT 456092077 / Confirmed  Inactive: Obese / ICD-9-.00  Resolved: Inpatient stay / SNOMED CT 635884346  Resolved: Inpatient stay / SNOMED CT 429263600      Histories   Past Medical History:    Active  CORBY (Obstructive Sleep Apnea) (SNOMED CT 5027449801): Onset on 2014 at 69 years.  Comments:  2018 CDT 11:46 AM Jose Rivero MD  On 14 severe CORBY with AHI 81 ans oximetry naidir 64%. Optimal PAP presure not found.    2018 CDT 8:59 AM [facility] - Jose Ham MD  On 18 CPAP titration to 7.  Colon adenomas (SNOMED CT 3429024313)  Benign Essential Hypertension (SNOMED CT 8126632)  Chronic low back pain (SNOMED CT 394653656)  Nonischemic cardiomyopathy (SNOMED CT 710416976)  Resolved  Inpatient stay (SNOMED CT 220875571): Onset on 2018 at 73 years.  Resolved on 2019 at 73 years.  Comments:  1/3/2019 CST 10:04 AM Agnes Rose  @[place], [place] - Left knee hemiarthroplasty  Inpatient stay (SNOMED CT 251011503): Onset on 2018 at 73 years.  Resolved on 2018 at 73 years.  Comments:  2018 CDT 8:24 AM CDT - Ignacia Potter  [facility] - Chest pain.   Family History:    Cancer  Brother ()     Procedure history:    Arthroplasty (SNOMED CT 1813779889) performed by Leon Florentino MD on 2018 at 73 Years.  Comments:  1/3/2019 10:07 AM Agnes Rose  left knee hemiarthroplasty  Facetectomy of vertebra (SNOMED CT 0544425) performed by Efrain Ramos MD on 7/10/2017 at 72 Years.  Comments:  2017 2:23 PM CDT - Ignacia Potter  L3-4.  Phacoemulsification (SNOMED CT 2097369786) performed by Edmond Munguia MD on 2/12/2015 at 69 Years.  Comments:  2/17/2015 7:56 AM CST - Ignacia Potter  Left.  Phacoemulsification (SNOMED CT 1714604067) performed by Edmond Munguia MD on 1/30/2015 at 69 Years.  Comments:  2/5/2015 9:51 AM Ignacia Parsons  Right.  Colonoscopy (SNOMED CT 580080757) performed by Jose Ham MD on 10/23/2013 at 68 Years.  Comments:  10/23/2013 10:42 AM Jose Rivero MD  Pedunculated polyp 25 cm, snared, not retrieved. Severe left-sided diverticulosis. Repeat 5 years.  Colonoscopy (SNOMED CT 880534986) performed by Jose Ham MD on 10/22/2008 at 63 Years.  Comments:  10/23/2013 10:41 AM Jose Rivero MD  1 adenoma., left-sided diverticulosis, rectal AVM. Repeat 5 years.  Colonoscopy (SNOMED CT 391809720) in 2002 at 57 Years.  Lower Back Surgery.   Social History:        Electronic Cigarette/Vaping Assessment            Electronic Cigarette Use: Never.      Alcohol Assessment: Past            Quit 1969, Beer (12 oz)      Tobacco Assessment: Past            Past, Pipe, Started age 12 Years.  Stopped age 43 Years.            Never (less than 100 in lifetime)      Substance Abuse Assessment: Denies Substance Abuse            Never      Employment and Education Assessment            Retired, Work/School description: [employer].      Home and Environment Assessment            Marital status: .  Spouse/Partner name: Josey.  Lives with Spouse.  2 children.  Living situation:               Home/Independent.  Smoker in household: No.  Injuries/Abuse/Neglect in household: No.  Feels unsafe at home:               No.  Family/Friends available for support: Yes.      Nutrition and Health Assessment            Type of diet: Regular.      Exercise and Physical Activity Assessment            Exercise frequency: Never.      Sexual Assessment            Identifies as male            Sexually active: No.  Sexual  orientation: Heterosexual.        Physical Examination   Vital Signs   3/9/2021 10:46 AM CST Temperature Tympanic 98.6 DegF    Peripheral Pulse Rate 120 bpm  HI    Systolic Blood Pressure 158 mmHg  HI    Diastolic Blood Pressure 94 mmHg  HI    Mean Arterial Pressure 115 mmHg    BP Site Right arm    BP Method Manual    Oxygen Saturation 98 %      Measurements from flowsheet : Measurements   3/9/2021 10:46 AM CST Height/Length Estimated 67.5 in    Weight Measured - Standard 199 lb      Documented vital signs:       Blood Pressure: Systolic  135  mmHg, Diastolic  85  mmHg.    General:  Alert and oriented, No acute distress.    Eye:  Normal conjunctiva.    HENT:  Normocephalic, Tympanic membranes are clear, Normal hearing.         Sinus: Frontal sinus, Tenderness.    Neck:  Supple, Non-tender.    Respiratory:  Lungs are clear to auscultation, Respirations are non-labored.    Cardiovascular:  Normal rate, Regular rhythm, No murmur.    Musculoskeletal:       Spine/torso exam: Lumbar ( Tenderness ).       Impression and Plan   Diagnosis     Maxillary sinusitis (NNF37-FO J32.0).     Course:  Progressing as expected.    Orders     Orders   Pharmacy:  predniSONE 20 mg oral tablet (Prescribe): = 2 tab(s) ( 40 mg ), PO, Daily, x 5 day(s), # 10 tab(s), 0 Refill(s), Type: Maintenance, Pharmacy: [facility], 2 tab(s) Oral daily,x5 day(s), 67.5, in, 10/30/2020 3:59 PM CDT, Height Measured, 199, lb, 3/9/2021 10:46 AM CST, Weight Measured  azithromycin 250 mg oral tablet (Prescribe): = 1 packet(s), PO, Once, Instructions: as directed on package labeling, # 6 tab(s), 0 Refill(s), Type: Soft Stop, Pharmacy: [facility], 1 packet(s) Oral once,Instr:as directed on package labeling, 67.5, in, 10/30/2020 3:59 PM CDT, Height Measured, 199, lb, 3/9/2021 10:46 AM CST, Weight Measured.     Orders (Selected)    Prescriptions  Prescribed  fluticasone 50 mcg/inh nasal spray: See Instructions, Instructions: 2 spray(s)   in each nostril daily, # 15.8 mL, 5 Refill(s), Type: Maintenance, Pharmacy: [facility], 2 spray(s) ; in each nostril daily, 67.5, in, 10/30/20 15:....     Diagnosis     Low back pain (SXQ47-QQ M54.5).     Course:  Progressing as expected.    Plan:  MRI  Reviewed 2017.    Orders     Will send to ortho when he is ready..

## 2022-02-16 NOTE — PROGRESS NOTES
Patient:   ZHANE HOLBROOK            MRN: [id number]            FIN: [id number]               Age:   72 years     Sex:  Male     :  1945   Associated Diagnoses:   Foreign body of right orbit   Author:   Rizwan Noe PA-C      Subjective   Chief complaint     2017 2:20 PM CDT pt went in for Lumbar MRI, unable to do scan due to object in RT eye   2017 10:23 AM CDT c/o back pain worsening x 4 months; PT has not helped   .     Scheduled for MRI L spine today.  film of orbits shows probable metallic FB right orbit. Works with metal grinding etc. No specific injury.      Health Status   Allergies:    Allergic Reactions (All)  No Known Medication Allergies  Canceled/Inactive Reactions (All)  No known allergies   Medications:  (Selected)   Prescriptions  Prescribed  lisinopril 10 mg oral tablet: 1 tab(s) ( 10 mg ), PO, Daily, # 90 tab(s), 3 Refill(s), Type: Maintenance, Pharmacy: [id number], 1 tab(s) po daily  Documented Medications  Documented  Excedrin: 2 tab(s), po, q6 hrs, 0 Refill(s), Type: Maintenance  occuvite- eye vitamin: occuvite- eye vitamin, Supply, 0 Refill(s), Type: Maintenance   Problem list:    All Problems  Benign Essential Hypertension / SNOMED CT 4957371 / Confirmed  Chronic low back pain / SNOMED CT 641070965 / Confirmed  Colon Adenomas / ICD-9-.3 / Confirmed  Obese / ICD-9-.00 / Probable  CORBY (Obstructive Sleep Apnea) / SNOMED CT 0534645840 / Confirmed      Objective   Vital Signs   2017 2:20 PM CDT Temperature Temporal 97.6 DegF    Peripheral Pulse Rate 84 bpm    Pulse Site Radial artery    Systolic Blood Pressure 134 mmHg    Diastolic Blood Pressure 88 mmHg    Mean Arterial Pressure 103 mmHg    BP Site Left arm    BP Method Manual   2017 10:23 AM CDT Temperature Tympanic 98.8 DegF    Peripheral Pulse Rate 72 bpm    Pulse Site Radial artery    HR Method Manual    Systolic Blood Pressure 138 mmHg    Diastolic Blood Pressure 86 mmHg    Mean Arterial  Pressure 103 mmHg    BP Site Left arm    BP Method Manual      Measurements from flowsheet : Measurements   4/5/2017 2:20 PM CDT Height Measured - Standard 67.5 in    Weight Measured - Standard 200.4 lb    BSA 2.08 m2    Body Mass Index 30.92 kg/m2   4/4/2017 10:23 AM CDT Height Measured - Standard 67.5 in    Weight Measured - Standard 199.2 lb    BSA 2.07 m2    Body Mass Index 30.74 kg/m2      Eye:  Pupils are equal, round and reactive to light, Extraocular movements are intact, Normal conjunctiva.       Impression and Plan   Assessment and Plan:       Diagnosis: Foreign body of right orbit (PQA13-OV H05.51).    In to Dr. Munguia. Report reviewed and sent to Dr. Munguia.

## 2022-02-16 NOTE — NURSING NOTE
Comprehensive Intake Entered On:  6/29/2021 11:42 AM CDT    Performed On:  6/29/2021 11:34 AM CDT by Jennifer Ruiz LPN               Summary   Chief Complaint :   Would like handicap forms filed out, check ears, check skin lesions, follow up back.    Advance Directive :   No   Menstrual Status :   N/A   Weight Measured :   196 lb(Converted to: 196 lb 0 oz, 88.904 kg)    Height Measured :   67 in(Converted to: 5 ft 7 in, 170.18 cm)    Body Mass Index :   30.69 kg/m2 (HI)    Body Surface Area :   2.05 m2   Height/Length Estimated :   67.5 in(Converted to: 5 ft 7 in, 171.45 cm)    Systolic Blood Pressure :   146 mmHg (HI)    Diastolic Blood Pressure :   74 mmHg   Mean Arterial Pressure :   98 mmHg   Peripheral Pulse Rate :   64 bpm   BP Site :   Right arm   Pulse Site :   Radial artery   BP Method :   Manual   HR Method :   Manual   Temperature Tympanic :   97.9 DegF(Converted to: 36.6 DegC)    Jennifer Ruiz LPN - 6/29/2021 11:34 AM CDT   Health Status   Allergies Verified? :   Yes   Medication History Verified? :   Yes   Immunizations Current :   Yes   Pre-Visit Planning Status :   Completed   Tobacco Use? :   Never smoker   Jennifer Ruiz LPN - 6/29/2021 11:34 AM CDT   Consents   Consent for Immunization Exchange :   Consent Granted   Consent for Immunizations to Providers :   Consent Granted   Jennifer Ruiz LPN - 6/29/2021 11:34 AM CDT   Meds / Allergies   (As Of: 6/29/2021 11:42:06 AM CDT)   Allergies (Active)   No Known Medication Allergies  Estimated Onset Date:   Unspecified ; Created By:   Daina Alvarez CMA; Reaction Status:   Active ; Category:   Drug ; Substance:   No Known Medication Allergies ; Type:   Allergy ; Updated By:   Daina Alvarez CMA; Reviewed Date:   6/29/2021 11:34 AM CDT        Medication List   (As Of: 6/29/2021 11:42:06 AM CDT)   Prescription/Discharge Order    atorvastatin  :   atorvastatin ; Status:   Prescribed ; Ordered As Mnemonic:   atorvastatin 40 mg oral tablet ;  Simple Display Line:   40 mg, 1 tab(s), Oral, daily, 90 tab(s), 3 Refill(s) ; Ordering Provider:   Rizwan Noe PA-C; Catalog Code:   atorvastatin ; Order Dt/Tm:   8/28/2020 9:08:41 AM CDT          azithromycin  :   azithromycin ; Status:   Prescribed ; Ordered As Mnemonic:   azithromycin 250 mg oral tablet ; Simple Display Line:   1 packet(s), PO, Once, as directed on package labeling, 6 tab(s), 0 Refill(s) ; Ordering Provider:   Mike Strong MD; Catalog Code:   azithromycin ; Order Dt/Tm:   3/9/2021 11:08:11 AM CST          diclofenac topical  :   diclofenac topical ; Status:   Prescribed ; Ordered As Mnemonic:   Voltaren Arthritis Pain 1% topical gel ; Simple Display Line:   2 gm, Topical, qid, 240 gm, 5 Refill(s) ; Ordering Provider:   iMke Strong MD; Catalog Code:   diclofenac topical ; Order Dt/Tm:   9/28/2020 9:43:49 AM CDT          fluticasone nasal  :   fluticasone nasal ; Status:   Prescribed ; Ordered As Mnemonic:   Flonase 50 mcg/inh nasal spray ; Simple Display Line:   2 spray(s), Nasal, daily, 1 EA, 11 Refill(s) ; Ordering Provider:   Jose Ham MD; Catalog Code:   fluticasone nasal ; Order Dt/Tm:   10/30/2020 4:07:20 PM CDT          fluticasone nasal  :   fluticasone nasal ; Status:   Prescribed ; Ordered As Mnemonic:   fluticasone 50 mcg/inh nasal spray ; Simple Display Line:   See Instructions, 2 spray(s)   in each nostril daily, 15.8 mL, 5 Refill(s) ; Ordering Provider:   Mike Strong MD; Catalog Code:   fluticasone nasal ; Order Dt/Tm:   3/9/2021 11:05:52 AM CST          lisinopril  :   lisinopril ; Status:   Prescribed ; Ordered As Mnemonic:   lisinopril 20 mg oral tablet ; Simple Display Line:   40 mg, 2 tab(s), Oral, daily, for 90 day(s), 180 tab(s), 3 Refill(s) ; Ordering Provider:   Mike Strong MD; Catalog Code:   lisinopril ; Order Dt/Tm:   11/27/2020 2:43:19 PM CST          meclizine  :   meclizine ; Status:   Prescribed ; Ordered As Mnemonic:    meclizine 25 mg oral tablet ; Simple Display Line:   25 mg, 1 tab(s), po, tid, PRN: as needed for dizziness, 270 tab(s), 3 Refill(s) ; Ordering Provider:   Rizwan Noe PA-C; Catalog Code:   meclizine ; Order Dt/Tm:   8/28/2020 9:08:41 AM CDT          metoprolol  :   metoprolol ; Status:   Prescribed ; Ordered As Mnemonic:   Metoprolol Succinate  mg oral tablet, extended release ; Simple Display Line:   100 mg, 1 tab(s), Oral, daily, 90 tab(s), 3 Refill(s) ; Ordering Provider:   Rizwan Noe PA-C; Catalog Code:   metoprolol ; Order Dt/Tm:   8/28/2020 9:08:39 AM CDT          Miscellaneous Rx Supply  :   Miscellaneous Rx Supply ; Status:   Prescribed ; Ordered As Mnemonic:   CPAP +7 cm H2o ; Simple Display Line:   See Instructions, Heated humidifier, heated tubing, filters, nasal or full face mask of choice with headgear.  Replacement cushions and supplies as needed.  Months = 99 (Lifetime), 1 EA, 0 Refill(s) ; Ordering Provider:   Joes Ham MD; Catalog Code:   Miscellaneous Rx Supply ; Order Dt/Tm:   10/30/2020 4:00:50 PM CDT          predniSONE  :   predniSONE ; Status:   Prescribed ; Ordered As Mnemonic:   predniSONE 20 mg oral tablet ; Simple Display Line:   40 mg, 2 tab(s), PO, Daily, for 5 day(s), 10 tab(s), 0 Refill(s) ; Ordering Provider:   Mike Strong MD; Catalog Code:   predniSONE ; Order Dt/Tm:   3/9/2021 11:08:18 AM CST

## 2022-02-16 NOTE — TELEPHONE ENCOUNTER
Entered by Rizwan Noe PA-C on August 20, 2021 8:59:39 AM CDT  ---------------------  From: Rizwan Noe PA-C   To: [facility] [employer] [facility] Thomas Cannon    Sent: 8/20/2021 8:59:39 AM CDT  Subject: Medication Management     ** Submitted: **  Complete:atorvastatin (atorvastatin 40 mg oral tablet)   Signed by Rizwan Noe PA-C  8/20/2021 1:59:00 PM [facility]    ** Approved with modifications: **  atorvastatin (atorvastatin 40 mg tablet)  TAKE ONE Tablet BY MOUTH EVERY DAY  Qty:  90 tab(s)        Days Supply:  90        Refills:  3          Substitutions Allowed     Route To Pharmacy - [facility]   Note from Pharmacy:  This prescription was filled on 6/3/2021. Any refills authorized will be placed on file.              ------------------------------------------  From: [facility]  To: Rizwan Noe PA-C  Sent: August 19, 2021 5:43:40 PM CDT  Subject: Medication Management  Due: August 20, 2021 11:17:32 AM CDT     ** On Hold Pending Signature **     Drug: atorvastatin (atorvastatin 40 mg oral tablet), 1 tab(s) Oral daily  Quantity: 90 tab(s)  Days Supply: 0  Refills: 2  Substitutions Allowed  Notes from Pharmacy:     Dispensed Drug: atorvastatin (atorvastatin 40 mg oral tablet), TAKE ONE Tablet BY MOUTH EVERY DAY  Quantity: 90 tab(s)  Days Supply: 90  Refills: 3  Substitutions Allowed  Notes from Pharmacy: This prescription was filled on 6/3/2021. Any refills authorized will be placed on file.  ------------------------------------------

## 2022-02-16 NOTE — PROGRESS NOTES
Patient:   ZHANE HOLBROOK            MRN: [id number]            FIN: [id number]               Age:   73 years     Sex:  Male     :  1945   Associated Diagnoses:   H/O total knee replacement   Author:   Mike Strong MD      Chief Complaint   1/3/2019 10:38 AM CST    Follow up knee, Change bandage     Chief complaint and symptoms noted above confirmed with patient.      Subjective   Chief complaint 1/3/2019 10:38 AM CST    Follow up knee, Change bandage  .     Doing well.  Improving every day.  No fever, chills or sweats.      Health Status   Allergies:    Allergic Reactions (Selected)  No Known Medication Allergies   Medications:  (Selected)   Prescriptions  Prescribed  CPAP +7 cm H2o: CPAP +7 cm H2o, See Instructions, Instructions: Heated humidifier, heated tubing, filters, nasal or full face mask of choice with headgear.  Replacement cushions and supplies as needed.  Months = 99 (Lifetime), Supply, # 1 EA, 0 Refill(s), Type: Maint...  atorvastatin 40 mg oral tablet: 1 tab(s) ( 40 mg ), po, daily, Instructions: Per [facility] [place] [facility], # 90 tab(s), 3 Refill(s), Type: Maintenance, Pharmacy: [id number], 1 tab(s) Oral daily,Instr:Per [facility] [place] 2018  lisinopril 10 mg oral tablet: 1 tab(s) ( 10 mg ), PO, Daily, # 90 tab(s), 3 Refill(s), Type: Maintenance, Pharmacy: [id number], 1 tab(s) Oral daily  meclizine 25 mg oral tablet: 1 tab(s) ( 25 mg ), po, tid, PRN: as needed for dizziness, # 30 tab(s), 2 Refill(s), Type: Maintenance, Pharmacy: [id number], 1 tab(s) Oral tid,PRN:as needed for dizziness  metoprolol succinate 100 mg oral tablet, extended release: 1 tab(s) ( 100 mg ), po, daily, Instructions: Per [facility] [place] 2018, # 90 tab(s), 3 Refill(s), Type: Maintenance, Pharmacy: [id number], 1 tab(s) Oral daily,Instr:Per [facility] [place] 2018  Documented Medications  Documented  CeleBREX: Oral, 0 Refill(s), Type: Maintenance  Concerta: Oral, qam, 0 Refill(s), Type:  Maintenance  Xarelto: Oral, 0 Refill(s), Type: Maintenance  oxyCODONE: Oral, 0 Refill(s), Type: Maintenance   Problem list:    All Problems (Selected)  Colon adenomas / SNOMED CT 6001318734 / Confirmed  Benign Essential Hypertension / SNOMED CT 4999032 / Confirmed  Nonischemic cardiomyopathy / SNOMED CT 472059259 / Confirmed  Chronic low back pain / SNOMED CT 769795155 / Confirmed  Obese / ICD-9-.00 / Probable  CORBY (Obstructive Sleep Apnea) / SNOMED CT 4784903222 / Confirmed      Objective   Vital Signs   1/3/2019 10:38 AM CST Peripheral Pulse Rate 64 bpm    Pulse Site Radial artery    HR Method Manual    Systolic Blood Pressure 140 mmHg  HI    Diastolic Blood Pressure 80 mmHg    Mean Arterial Pressure 100 mmHg    BP Site Right arm    BP Method Manual      Measurements from flowsheet : Measurements   1/3/2019 10:38 AM CST Height Measured - Standard 67.5 in    Weight Measured - Standard 205.8 lb    BSA 2.11 m2    Body Mass Index 31.75 kg/m2  HI      General:  Alert and oriented, No acute distress.    Neck:  Supple.    Respiratory:  Lungs are clear to auscultation.    Cardiovascular:  Normal rate, Regular rhythm.    Integumentary:  Warm, Dry, Pink, Wound healing, dressing replaced..       Impression and Plan   Assessment and Plan:          Diagnosis: H/O total knee replacement (DYF84-IF Z96.652).    Orders     Continue with PT and OT..     .    Assessment and Plan:  Orders     More than half of a 15 minute visit spent on counseling the above problems..     .

## 2022-02-16 NOTE — PROGRESS NOTES
Patient:   ZHANE HOLBROOK            MRN: [id number]            FIN: [id number]               Age:   73 years     Sex:  Male     :  1945   Associated Diagnoses:   Cardiomyopathy, dilated, nonischemic; CORBY (Obstructive Sleep Apnea)   Author:   Mike Strong MD      Chief Complaint   2018 3:10 PM CDT    f/u hospital - elevated troponin     Chief complaint and symptoms noted above confirmed with patient.      Interval History   Cardiomyopathy   Change in activity tolerance able to tolerate walking 1-2 blocks on the level.  The course is improving.  Compliance problems: not with diet.     Also was told to get CORBY reevaluated .  Had exam in  show severe CORBY.      Review of Systems   Constitutional:  Negative.    Respiratory:  Negative except as documented in history of present illness.    Cardiovascular:  Negative except as documented in history of present illness.       Physical Examination   Vital Signs   2018 3:10 PM CDT Temperature Tympanic 98.1 DegF    Peripheral Pulse Rate 64 bpm    Pulse Site Radial artery    HR Method Manual    Systolic Blood Pressure 114 mmHg    Diastolic Blood Pressure 68 mmHg    Mean Arterial Pressure 83 mmHg    BP Site Left arm    BP Method Manual      General:  Alert and oriented, No acute distress.    Eye:  Normal conjunctiva.    HENT:  Normocephalic, Tympanic membranes are clear.    Neck:  Supple, Non-tender.    Respiratory:  Lungs are clear to auscultation, Respirations are non-labored.    Cardiovascular:  Normal rate, Regular rhythm, No murmur, No edema.    Gastrointestinal:  Soft, Non-tender, Non-distended.       Impression and Plan   Diagnosis     Cardiomyopathy, dilated, nonischemic (RDT20-WH I42.0).     Course:  Improving.    Orders     Orders   Lab (Gen Lab  Reference Lab):  B type natriuretic peptide (BNP)* ([facility]) (Order): Specimen Type: Plasma, Collection Date: 2018 3:31 PM CDT  Basic Metabolic Panel* ([facility]) (Order): Specimen Type: Serum,  Collection Date: 4/18/2018 3:31 PM CDT.     Orders   Requests (Return to Office):  Return to Clinic (Request) (Order): Return in 2 months.     Diagnosis     CORBY (Obstructive Sleep Apnea) (FGN73-OD G47.33).     Course:  Improving: none.    Orders     Orders   Requests (Consults / Referrals):  Referral (Request) (Order): 4/18/2018 3:40 PM CDT, Referred to: Internal Medicine, Referred to: CECILIA, Reason for referral: Follow up 2014 Sleep Study, CORBY (Obstructive Sleep Apnea).        Professional Services   Counseling Summary:  This was a 25 minute visit with greater than 50% of that time spent counseling the patient, and coordination of care..    Counseling included treatment options, risks and benefits, lifestyle changes, medications, dose adjustments, and new diagnosis.    The patient was interactive, attentive, and asked questions.    Family present included spouse.

## 2022-02-16 NOTE — TELEPHONE ENCOUNTER
Entered by Liza Stone MA on August 26, 2020 11:00:24 AM CDT  ---------------------  From: Liza Stone MA   To: [facility]    Sent: 8/26/2020 11:00:24 AM CDT  Subject: Medication Management     ** Not Approved: Refill not appropriate, Will fill at visit 8/28/2020 **  lisinopril (lisinopril 10 mg tablet)  TAKE ONE TABLET BY MOUTH DAILY  Qty:  90 tab(s)        Days Supply:  90        Refills:  2          Substitutions Allowed     Route To [facility]   Signed by Liza Stone MA    ** Not Approved: Refill not appropriate, Will fill at visit 8/28/2020 **  atorvastatin (atorvastatin 40 mg tablet)  TAKE ONE TABLET BY MOUTH DAILY  Qty:  90 tab(s)        Days Supply:  90        Refills:  0          Substitutions Allowed     Route To [facility]   Signed by Liza Stone MA            ** Patient matched by Liza Stone MA on 8/26/2020 10:59:51 AM CDT **      ------------------------------------------  From: [facility]  To: Rizwan Noe PA-C  Sent: August 26, 2020 9:33:17 AM CDT  Subject: Medication Management  Due: August 12, 2020 4:17:26 PM CDT     ** On Hold Pending Signature **     Drug: lisinopril (lisinopril 10 mg oral tablet), TAKE ONE TABLET BY MOUTH DAILY  Quantity: 90 tab(s)  Days Supply: 90  Refills: 1  Substitutions Allowed  Notes from Pharmacy:     Dispensed Drug: lisinopril (lisinopril 10 mg oral tablet), TAKE ONE TABLET BY MOUTH DAILY  Quantity: 90 tab(s)  Days Supply: 90  Refills: 2  Substitutions Allowed  Notes from Pharmacy:     ** On Hold Pending Signature **     Drug: atorvastatin (atorvastatin 40 mg oral tablet), TAKE ONE TABLET BY MOUTH DAILY  Quantity: 90 tab(s)  Days Supply: 90  Refills: 0  Substitutions Allowed  Notes from Pharmacy:     Dispensed Drug: atorvastatin  (atorvastatin 40 mg oral tablet), TAKE ONE TABLET BY MOUTH DAILY  Quantity: 90 tab(s)  Days Supply: 90  Refills: 0  Substitutions Allowed  Notes from Pharmacy:  ------------------------------------------

## 2022-02-16 NOTE — PROGRESS NOTES
Patient:   ZHANE HOLBROOK            MRN: [id number]            FIN: [id number]               Age:   72 years     Sex:  Male     :  1945   Associated Diagnoses:   Benign positional vertigo   Author:   Mike Strong MD      Chief Complaint   3/29/2018 9:07 AM CDT    Discuss vertigo - meclizine refill     Chief complaint and symptoms noted above confirmed with patient.      History of Present Illness             The patient presents with vertigo.  The vertigo is described as feeling lightheaded and sensation of spinning.  The severity of the vertigo is moderate.  The vertigo is episodic.  The vertigo has lasted for 5 day(s).  Exacerbating factors consist of none.  Relieving factors consist of medication and rest.        Review of Systems   Constitutional:  Negative.    Ear/Nose/Mouth/Throat:  Negative except as documented in history of present illness.    Cardiovascular:  Negative.    Gastrointestinal:  Negative.       Health Status   Allergies:    Allergic Reactions (Selected)  No Known Medication Allergies   Problem list:    All Problems  Benign Essential Hypertension / SNOMED CT 5806947 / Confirmed  Chronic low back pain / SNOMED CT 308040602 / Confirmed  Colon Adenomas / ICD-9-.3 / Confirmed  Obese / ICD-9-.00 / Probable  CORBY (Obstructive Sleep Apnea) / SNOMED CT 1153113280 / Confirmed      Histories   Past Medical History:    Active  Colon Adenomas (ICD-9-.3)  Benign Essential Hypertension (SNOMED CT 6624223)  CORBY (Obstructive Sleep Apnea) (SNOMED CT 4930944051)  Chronic low back pain (SNOMED CT 537282034)   Family History:    Cancer  Brother ()     Procedure history:    Facetectomy of vertebra (SNOMED CT 8718447) performed by Efrain Ramos MD on 7/10/2017 at 72 Years.  Comments:  2017 2:23 PM - Ignacia Potter  Right L3-4.  Phacoemulsification (SNOMED CT 3600730944) performed by Edmond Munguia MD on 2015 at 69 Years.  Comments:  2015 7:56 AM - Tariq  Ignacia  Left.  Phacoemulsification (SNOMED CT 7264650973) performed by Edmond Munguia MD on 1/30/2015 at 69 Years.  Comments:  2/5/2015 9:51 AM - Ignacia Potter  Right.  Colonoscopy (SNOMED CT 761028450) performed by Jose Ham MD on 10/23/2013 at 68 Years.  Comments:  10/23/2013 10:42 AM - Jose Ham MD  Pedunculated polyp 25 cm, snared, not retrieved. Severe left-sided diverticulosis. Repeat 5 years.  Colonoscopy (SNOMED CT 452321702) performed by Jose Ham MD on 10/22/2008 at 63 Years.  Comments:  10/23/2013 10:41 AM - Jose Ham MD  1 adenoma., left-sided diverticulosis, rectal AVM. Repeat 5 years.  Colonoscopy (SNOMED CT 068063335) in 2002 at 57 Years.  Lower Back Surgery.      Physical Examination   Vital Signs   3/29/2018 9:07 AM CDT Temperature Tympanic 98.1 DegF    Peripheral Pulse Rate 92 bpm    Pulse Site Radial artery    HR Method Manual    Systolic Blood Pressure 146 mmHg  HI    Diastolic Blood Pressure 88 mmHg  HI    Mean Arterial Pressure 107 mmHg    BP Site Left arm    BP Method Manual      Measurements from flowsheet : Measurements   3/29/2018 9:07 AM CDT Height Measured - Standard 67.5 in    Weight Measured - Standard 206.6 lb    BSA 2.11 m2    Body Mass Index 31.88 kg/m2  HI      Documented vital signs:       Blood Pressure: Systolic  130  mmHg, Diastolic  80  mmHg.    General:  Alert and oriented, No acute distress.    Eye:  Pupils are equal, round and reactive to light, Extraocular movements are intact, Normal conjunctiva.    HENT:  Normocephalic, Tympanic membranes are clear, Normal hearing, Oral mucosa is moist.    Neck:  Supple, Non-tender.    Respiratory:  Lungs are clear to auscultation, Respirations are non-labored.    Cardiovascular:  Normal rate, Regular rhythm.       Impression and Plan   Diagnosis     Benign positional vertigo (CHI33-NA H81.10).     Course:  Not improving.    Plan:       Refer to: Declined PT.    Patient Instructions:       Counseled: Patient,  Verbalized understanding.    Orders     Orders   Pharmacy:  meclizine 25 mg oral tablet (Prescribe): 1 tab(s) ( 25 mg ), po, tid, PRN: as needed for dizziness, # 30 tab(s), 2 Refill(s), Type: Maintenance, Pharmacy: [facility] #[id number], 1 tab(s) po tid,PRN:as needed for dizziness  meclizine 25 mg oral tablet (Modify): 1 tab(s) ( 25 mg ), po, tid, PRN: as needed for dizziness, # 30 tab(s), 1 Refill(s), Type: Hard Stop, Pharmacy: [facility] #[id number].     Orders   Requests (Consults / Referrals):  Referral (Request) (Order): 3/29/2018 9:43 AM CDT, Referred to: Physical Therapy, Referred to: PT, Reason for referral: Eval and treat for BPV,  NECK IS SENSITIVE, Benign positional vertigo.        Professional Services   Counseling Summary:  This was a 25 minute visit with greater than 50% of that time spent counseling the patient, and coordination of care..    Counseling included treatment options, risks and benefits, current condition, medications, and dose adjustments.    The patient was interactive, attentive, and asked questions.

## 2022-02-16 NOTE — NURSING NOTE
Comprehensive Intake Entered On:  9/28/2020 8:56 AM CDT    Performed On:  9/28/2020 8:49 AM CDT by Kailey Morales               Summary   Chief Complaint :   Pt c/o hip pain. Asking for hip XR.    Advance Directive :   No   Menstrual Status :   N/A   Weight Measured :   195 lb(Converted to: 195 lb 0 oz, 88.45 kg)    Height Measured :   67.5 in(Converted to: 5 ft 7 in, 171.45 cm)    Body Mass Index :   30.09 kg/m2 (HI)    Body Surface Area :   2.05 m2   Systolic Blood Pressure :   126 mmHg   Diastolic Blood Pressure :   80 mmHg   Mean Arterial Pressure :   95 mmHg   Peripheral Pulse Rate :   60 bpm   Temperature Tympanic :   97.1 DegF(Converted to: 36.2 DegC)  (LOW)    Kailey Morales - 9/28/2020 8:49 AM CDT   Health Status   Allergies Verified? :   Yes   Medication History Verified? :   Yes   Immunizations Current :   Yes   Medical History Verified? :   Yes   Pre-Visit Planning Status :   Completed   Tobacco Use? :   Never smoker   Kailey Morales - 9/28/2020 8:49 AM CDT   Consents   Consent for Immunization Exchange :   Consent Granted   Consent for Immunizations to Providers :   Consent Granted   Kailey Morales - 9/28/2020 8:49 AM CDT   Meds / Allergies   (As Of: 9/28/2020 8:56:45 AM CDT)   Allergies (Active)   No Known Medication Allergies  Estimated Onset Date:   Unspecified ; Created By:   Daina Alvarez CMA; Reaction Status:   Active ; Category:   Drug ; Substance:   No Known Medication Allergies ; Type:   Allergy ; Updated By:   Daina Alvarez CMA; Reviewed Date:   9/28/2020 8:53 AM CDT        Medication List   (As Of: 9/28/2020 8:56:45 AM CDT)   Prescription/Discharge Order    atorvastatin  :   atorvastatin ; Status:   Prescribed ; Ordered As Mnemonic:   atorvastatin 40 mg oral tablet ; Simple Display Line:   40 mg, 1 tab(s), Oral, daily, 90 tab(s), 3 Refill(s) ; Ordering Provider:   Rizwan Noe PA-C; Catalog Code:   atorvastatin ; Order Dt/Tm:   8/28/2020 9:08:41 AM CDT           lisinopril  :   lisinopril ; Status:   Prescribed ; Ordered As Mnemonic:   lisinopril 20 mg oral tablet ; Simple Display Line:   20 mg, 1 tab(s), Oral, daily, 90 tab(s), 3 Refill(s) ; Ordering Provider:   Rizwan Noe PA-C; Catalog Code:   lisinopril ; Order Dt/Tm:   8/28/2020 9:08:40 AM CDT          meclizine  :   meclizine ; Status:   Prescribed ; Ordered As Mnemonic:   meclizine 25 mg oral tablet ; Simple Display Line:   25 mg, 1 tab(s), po, tid, PRN: as needed for dizziness, 270 tab(s), 3 Refill(s) ; Ordering Provider:   Rizwan Noe PA-C; Catalog Code:   meclizine ; Order Dt/Tm:   8/28/2020 9:08:41 AM CDT          metoprolol  :   metoprolol ; Status:   Prescribed ; Ordered As Mnemonic:   Metoprolol Succinate  mg oral tablet, extended release ; Simple Display Line:   100 mg, 1 tab(s), Oral, daily, 90 tab(s), 3 Refill(s) ; Ordering Provider:   Rizwan Noe PA-C; Catalog Code:   metoprolol ; Order Dt/Tm:   8/28/2020 9:08:39 AM CDT          Miscellaneous Rx Supply  :   Miscellaneous Rx Supply ; Status:   Prescribed ; Ordered As Mnemonic:   CPAP +7 cm H2o ; Simple Display Line:   See Instructions, Heated humidifier, heated tubing, filters, nasal or full face mask of choice with headgear.  Replacement cushions and supplies as needed.  Months = 99 (Lifetime), 1 EA, 0 Refill(s) ; Ordering Provider:   Jose Ham MD; Catalog Code:   Miscellaneous Rx Supply ; Order Dt/Tm:   5/29/2018 9:03:09 AM CDT            ID Risk Screen   Recent Travel History :   No recent travel   Family Member Travel History :   No recent travel   Other Exposure to Infectious Disease :   Unknown   Kailey Morales - 9/28/2020 8:49 AM CDT

## 2022-02-16 NOTE — PROGRESS NOTES
Patient:   ZHANE HOLBROOK            MRN: [id number]            FIN: [id number]               Age:   73 years     Sex:  Male     :  1945   Associated Diagnoses:   Cardiomyopathy, dilated, nonischemic; CORBY (Obstructive Sleep Apnea)   Author:   Mike Strong MD      Chief Complaint   Chief complaint and symptoms noted above confirmed with patient.      Interval History   Cardiomyopathy   Change in activity tolerance able to tolerate walking 1-2 blocks on the level and Healing well from knee surgery.  The course is improving.  Compliance problems: not with diet.     On CPAP with good results         Review of Systems   Constitutional:  Negative.    Respiratory:  Negative except as documented in history of present illness.    Cardiovascular:  Negative except as documented in history of present illness.       Physical Examination   Vital Signs   2019 8:55 AM CST Peripheral Pulse Rate 60 bpm    Pulse Site Radial artery    HR Method Manual    Systolic Blood Pressure 136 mmHg  HI    Diastolic Blood Pressure 78 mmHg    Mean Arterial Pressure 97 mmHg    BP Site Right arm    BP Method Electronic      Measurements from flowsheet : Measurements   2019 8:55 AM CST Height Measured 67.5 in    Weight Measured 201.6 lb    BSA 2.08 m2    Body Mass Index 31.11 kg/m2  HI      General:  Alert and oriented, No acute distress.    Eye:  Normal conjunctiva.    HENT:  Normocephalic, Tympanic membranes are clear.    Neck:  Supple, Non-tender.    Respiratory:  Lungs are clear to auscultation, Respirations are non-labored.    Cardiovascular:  Normal rate, Regular rhythm, No murmur, No edema.    Gastrointestinal:  Soft, Non-tender, Non-distended.       Impression and Plan   Diagnosis     Cardiomyopathy, dilated, nonischemic (RKE28-QL I42.0).     Course:  Improving, Progressing as expected.    Plan:          Diet: Heart healthy diet, Sodium restricted.         Refer to: Cardiologist.    Orders   Diagnosis     CORBY (Obstructive  Sleep Apnea) (LRT47-YV G47.33).     Course:  Improving, Progressing as expected.    Orders      Professional Services   Counseling Summary:  This was a 25 minute visit with greater than 50% of that time spent counseling the patient, and coordination of care..    Counseling included treatment options, risks and benefits, lifestyle changes, medications, dose adjustments, and new diagnosis.    The patient was interactive, attentive, and asked questions.    Family present included spouse.

## 2022-02-16 NOTE — PROGRESS NOTES
Chief Complaint    CPAP compliance  History of Present Illness       Mr. Weems is a 75-year-old with severe obstructive sleep apnea and nonischemic cardiomyopathy who presents for follow-up.  He has had some trouble this fall with nasal stuffiness limiting his CPAP mask use.  His profound daytime sleepiness is improved.  Review of Systems       No headache, chest pain, dyspnea, edema, weight change.  Nocturia.  No heartburn.  Physical Exam   Vitals & Measurements    T: 99.1  F (Tympanic)  HR: 61 (Peripheral)  BP: 120/72  SpO2: 95%     HT: 67.5 in  WT: 193 lb  BMI: 29.78        Patient appears comfortable and in no distress.  Alert and oriented.  HEENT exam is Mallampati 3.  Chest clear.  Heart exam regular.  No edema.  Cranial nerves normal.  Assessment/Plan       Allergic rhinitis (J30.9)          Symptoms seem to be limiting his CPAP use we will start him on Flonase 2 puffs each nostril daily as needed.         Ordered:          fluticasone nasal, = 2 spray(s), Nasal, daily, # 1 EA, 11 Refill(s), Type: Maintenance, Pharmacy: [facility], 2 spray(s) Nasal daily, 67.5, in, 10/30/20 15:59:00 CDT, Height Measured, 193, lb, 10/30/20 15:59:00..., (Ordered)                Nonischemic cardiomyopathy (I42.8)          Stable         Ordered:          fluticasone nasal, = 2 spray(s), Nasal, daily, # 1 EA, 11 Refill(s), Type: Maintenance, Pharmacy: [facility], 2 spray(s) Nasal daily, 67.5, in, 10/30/20 15:59:00 CDT, Height Measured, 193, lb, 10/30/20 15:59:00..., (Ordered)          Miscellaneous Rx Supply, CPAP +7 cm H2o, See Instructions, Instructions: Heated humidifier, heated tubing, filters, nasal or full face mask of choice with headgear. Replacement cushions and supplies as needed. Months = 99 (Lifetime), Supply, # 1 EA, 0 Refill(s), Type: Maint..., (Ordered)          27727 office outpatient visit 15  minutes (Charge), Quantity: 1, CORBY (Obstructive Sleep Apnea)  Nonischemic cardiomyopathy                CORBY (Obstructive Sleep Apnea) (G47.33)          Severe obstructive sleep apnea with partial response to therapy.  Having some trouble due to nasal stuffiness which we will treat.  Repeat a device download in a month.         Ordered:          fluticasone nasal, = 2 spray(s), Nasal, daily, # 1 EA, 11 Refill(s), Type: Maintenance, Pharmacy: [facility], 2 spray(s) Nasal daily, 67.5, in, 10/30/20 15:59:00 CDT, Height Measured, 193, lb, 10/30/20 15:59:00..., (Ordered)          Miscellaneous Rx Supply, CPAP +7 cm H2o, See Instructions, Instructions: Heated humidifier, heated tubing, filters, nasal or full face mask of choice with headgear. Replacement cushions and supplies as needed. Months = 99 (Lifetime), Supply, # 1 EA, 0 Refill(s), Type: Maint..., (Ordered)          91874 office outpatient visit 15 minutes (Charge), Quantity: 1, CORBY (Obstructive Sleep Apnea)  Nonischemic cardiomyopathy           Patient Information     Name:ZHANE HOLBROOK      Address:      [address] [id number]     Sex:Male     YOB: 1945     [phone]     Emergency Contact:[facility] EMERGENCY, CONTACT     MRN:[id number]     FIN:[id number]     Location:[facility]     Date of Service:10/30/2020      Primary Care Physician:       Mike Strong MD, [phone]      Attending Physician:       Jose Ham MD, [phone]  Problem List/Past Medical History    Ongoing     Allergic rhinitis     Benign Essential Hypertension     Chronic low back pain     Colon adenomas     Nonischemic cardiomyopathy     Obese     Obesity     CORBY (Obstructive Sleep Apnea)       Comments: On 5/16/18 CPAP titration to 7. On 4/30/14 severe CORBY with AHI 81 ans oximetry naidir 64%. Optimal PAP presure not found.    Historical     Inpatient  stay       Comments: [facility] - Chest pain.     Inpatient stay       Comments: @[facility] [place], [place] - Left knee hemiarthroplasty  Procedure/Surgical History     Arthroplasty (12/31/2018)      Comments: left knee hemiarthroplasty.     Facetectomy of vertebra (07/10/2017)      Comments: Right L3-4..     Phacoemulsification (02/12/2015)      Comments: Left..     Phacoemulsification (01/30/2015)      Comments: Right..     Colonoscopy (10/23/2013)      Comments: Pedunculated polyp 25 cm, snared, not retrieved. Severe left-sided diverticulosis. Repeat 5 years..     Colonoscopy (10/22/2008)      Comments: 1 adenoma., left-sided diverticulosis, rectal AVM. Repeat 5 years..     Colonoscopy (2002)     Lower Back Surgery  Medications    atorvastatin 40 mg oral tablet, 40 mg= 1 tab(s), Oral, daily, 3 refills    CPAP +7 cm H2o, See Instructions    Flonase 50 mcg/inh nasal spray, 2 spray(s), Nasal, daily, 11 refills    lisinopril 20 mg oral tablet, 20 mg= 1 tab(s), Oral, daily, 3 refills    meclizine 25 mg oral tablet, 25 mg= 1 tab(s), Oral, tid, PRN, 3 refills    Metoprolol Succinate  mg oral tablet, extended release, 100 mg= 1 tab(s), Oral, daily, 3 refills    Voltaren Arthritis Pain 1% topical gel, 2 gm, Topical, qid, 5 refills  Allergies    No Known Medication Allergies  Social History    Smoking Status     Never smoker     Alcohol - Past      Quit 1969, Beer (12 oz)     Employment/School      Retired, Work/School description: [employer].     Exercise      Exercise frequency: Never.     Home/Environment      Marital status: . Spouse/Partner name: Josey. Lives with Spouse. 2 children. Living situation: Home/Independent. Smoker in household: No. Injuries/Abuse/Neglect in household: No. Feels unsafe at home: No. Family/Friends available for support: Yes.     Nutrition/Health      Type of diet: Regular.     Sexual      Identifies as male     Substance Abuse - Denies Substance Abuse      Never      Tobacco - Past      Past, Pipe, Started age 12 Years. Stopped age 43 Years.  Family History    Cancer: Brother.  Immunizations      Vaccine Date Status          ZOS, shingles 09/28/2015 Recorded          pneumococcal (PCV13) 08/18/2015 Given          influenza virus vaccine, inactivated 09/25/2009 Recorded          Td 05/06/2005 Recorded  Lab Results          Lab Results (Last 4 results within 90 days)           Sodium Level: 142 mmol/L [135 mmol/L - 146 mmol/L] (08/28/20 09:21:00)          Potassium Level: 4.7 mmol/L [3.5 mmol/L - 5.3 mmol/L] (08/28/20 09:21:00)          Chloride Level: 108 mmol/L [98 mmol/L - 110 mmol/L] (08/28/20 09:21:00)          CO2 Level: 27 mmol/L [20 mmol/L - 32 mmol/L] (08/28/20 09:21:00)          Glucose Level: 106 mg/dL High [65 mg/dL - 99 mg/dL] (08/28/20 09:21:00)          BUN: 20 mg/dL [7 mg/dL - 25 mg/dL] (08/28/20 09:21:00)          Creatinine Level: 1.22 mg/dL High [0.7 mg/dL - 1.18 mg/dL] (08/28/20 09:21:00)          BUN/Creat Ratio: 16 [6  - 22] (08/28/20 09:21:00)          eGFR: 57 mL/min/1.73m2 Low (08/28/20 09:21:00)          eGFR African American: 66 mL/min/1.73m2 (08/28/20 09:21:00)          Calcium Level: 9.3 mg/dL [8.6 mg/dL - 10.3 mg/dL] (08/28/20 09:21:00)          C-Reactive Protein (CRP): 2.6 mg/L (08/28/20 09:21:00)          Cholesterol: 105 mg/dL (08/28/20 09:21:00)          Non-HDL Cholesterol: 56 (08/28/20 09:21:00)          HDL: 49 mg/dL (08/28/20 09:21:00)          Cholesterol/HDL Ratio: 2.1 (08/28/20 09:21:00)          LDL: 43 (08/28/20 09:21:00)          Triglyceride: 52 mg/dL (08/28/20 09:21:00)          WBC: 7.8 [3.8  - 10.8] (08/28/20 09:21:00)          RBC: 4.93 [4.2  - 5.8] (08/28/20 09:21:00)          Hgb: 14.4 gm/dL [13.2 gm/dL - 17.1 gm/dL] (08/28/20 09:21:00)          Hct: 42.6 % [38.5 % - 50 %] (08/28/20 09:21:00)          MCV: 86.4 fL [80 fL - 100 fL] (08/28/20 09:21:00)          MCH: 29.2 pg [27 pg - 33 pg] (08/28/20 09:21:00)          MCHC: 33.8  gm/dL [32 gm/dL - 36 gm/dL] (08/28/20 09:21:00)          RDW: 12.4 % [11 % - 15 %] (08/28/20 09:21:00)          Platelet: 266 [140  - 400] (08/28/20 09:21:00)          MPV: 11.2 fL [7.5 fL - 12.5 fL] (08/28/20 09:21:00)          MEENA IFA: NEGATIVE [NEGATIVE  - NEGATIVE] (08/28/20 09:21:00)          Rheumatoid Factor: <14 (08/28/20 09:21:00)          Cyclic Citrullinated Peptide (CCP): <16 (08/28/20 09:21:00)  Diagnostic Results    CPAP compliance report reveals usage on 100% of nights with an average of 3 hours and 41 minutes.  Average AHI 35.  Pressure setting 7.

## 2022-02-16 NOTE — TELEPHONE ENCOUNTER
Entered by Liza Stone MA on July 24, 2019 3:31:41 PM CDT  ---------------------  From: Liza Stone MA   To: [facility] #[id number]    Sent: 7/24/2019 3:31:41 PM CDT  Subject: Medication Management     ** Not Approved: Refill not appropriate, Patient is due to be seen, only gets 30 day supply **  lisinopril (LISINOPRIL 10MG TABLETS)  TAKE 1 TABLET BY MOUTH DAILY  Qty:  90 tab(s)        Days Supply:  30        Refills:  0          Substitutions Allowed     Route To Pharmacy - [id number]   Note from Pharmacy:  **Patient requests 90 days supply**  Signed by Liza Stone MA            ------------------------------------------  From: [facility] #[id number]  To: Mike Strong MD  Sent: July 24, 2019 2:49:13 PM CDT  Subject: Medication Management  Due: July 25, 2019 2:49:13 PM CDT    ** On Hold Pending Signature **  Drug: lisinopril (lisinopril 10 mg oral tablet)  1 TAB(S) ORAL DAILY  Quantity: 30 tab(s)     Days Supply: 0         Refills: 0  Substitutions Allowed  Notes from Pharmacy: **Patient requests 90 days supply**    Dispensed Drug: lisinopril (lisinopril 10 mg oral tablet)  TAKE 1 TABLET BY MOUTH DAILY  Quantity: 90 tab(s)     Days Supply: 30        Refills: 0  Substitutions Allowed  Notes from Pharmacy: **Patient requests 90 days supply**  ------------------------------------------

## 2022-02-16 NOTE — LETTER
(Inserted Image. Unable to display)   [address] [place]  [address]  [phone]    July 26, 2019      ZHANE MCLEODE      [address], [place] [id number]        Dear ZHANE,    Thank you for selecting [facility] for your healthcare needs. Below you will find the result of your recent test(s) done at our clinic.     These are stable. See you in one year.      Result Name Current Result Previous Result Reference Range   Sodium Level (mmol/L)  142 7/25/2019  140 7/27/2018 135 - 146   Potassium Level (mmol/L)  4.9 7/25/2019  4.9 7/27/2018 3.5 - 5.3   Chloride Level (mmol/L)  107 7/25/2019  105 7/27/2018 98 - 110   CO2 Level (mmol/L)  28 7/25/2019  27 7/27/2018 20 - 32   Glucose Level (mg/dL) ((H)) 105 7/25/2019 ((H)) 102 7/27/2018 65 - 99   BUN (mg/dL) ((H)) 27 7/25/2019  23 7/27/2018 7 - 25   Creatinine Level (mg/dL) ((H)) 1.28 7/25/2019  1.12 7/27/2018 0.70 - 1.18   Calcium Level (mg/dL)  9.7 7/25/2019  9.6 7/27/2018 8.6 - 10.3   Cholesterol (mg/dL)  122 7/25/2019  113 7/27/2018  - <200   Non-HDL Cholesterol  70 7/25/2019  64 7/27/2018  - <130   HDL (mg/dL)  52 7/25/2019  49 7/27/2018 >40 -    Cholesterol/HDL Ratio  2.3 7/25/2019  2.3 7/27/2018  - <5.0   LDL  56 7/25/2019  50 7/27/2018    Triglyceride (mg/dL)  65 7/25/2019  65 7/27/2018  - <150   WBC  7.2 7/25/2019  7.5 7/27/2018 3.8 - 10.8   RBC  5.27 7/25/2019  5.05 7/27/2018 4.20 - 5.80   Hgb (gm/dL)  15.1 7/25/2019  14.4 7/27/2018 13.2 - 17.1   Hct (%)  46.4 7/25/2019  44.0 7/27/2018 38.5 - 50.0   MCV (fL)  88.0 7/25/2019  87.1 7/27/2018 80.0 - 100.0   MCH (pg)  28.7 7/25/2019  28.5 7/27/2018 27.0 - 33.0   MCHC (gm/dL)  32.5 7/25/2019  32.7 7/27/2018 32.0 - 36.0   RDW (%)  12.7 7/25/2019  12.8 7/27/2018 11.0 - 15.0   Platelet  265 7/25/2019  284 7/27/2018 140 - 400   MPV (fL)  11.7 7/25/2019  11.1 7/27/2018 7.5 - 12.5       Please contact my practice at [phone] if you have any questions or concerns.       Sincerely,        Mike Strong MD ,   Nydia Fofana MD,   Rizwan Noe PA-C

## 2022-02-16 NOTE — PROGRESS NOTES
Patient:   ZHANE HOLBROOK            MRN: [id number]            FIN: [id number]               Age:   72 years     Sex:  Male     :  1945   Associated Diagnoses:   Chronic low back pain   Author:   Mike Strong MD      Visit Information      Date of Service: 2017 10:14 am  Performing Location: [facility]  Encounter#: [id number]      Primary Care Provider (PCP):  Mike Strong MD    NPI# [id number]      Referring Provider:  No referring provider recorded for selected visit.      Chief Complaint   2017 10:23 AM CDT    c/o back pain worsening x 4 months; PT has not helped     Chief complaint and symptoms noted above confirmed with patient.      History of Present Illness             The patient presents with back pain.  The back pain is generalized and lumbar region.  The back pain is described as aching and burning.  The severity of the back pain is moderate.  The back pain is episodic.  The back pain has lasted for 99 month(s).  Radiation of pain: to the left upper extremity and to the right upper extremity.  The context of the back pain: occurred after exertion.  Exacerbating factors consist of none.  Relieving factors consist of analgesics and rest.  Associated symptoms consist of denies bladder dysfunction and denies bowel dysfunction.        Review of Systems   Constitutional:  Negative.    Respiratory:  Negative.    Musculoskeletal:  Negative except as documented in history of present illness.    Integumentary:  Negative except as documented in history of present illness.       Health Status   Allergies:    Allergic Reactions (Selected)  No Known Medication Allergies   Medications:  (Selected)   Prescriptions  Prescribed  lisinopril 10 mg oral tablet: 1 tab(s) ( 10 mg ), PO, Daily, # 90 tab(s), 3 Refill(s), Type: Maintenance, Pharmacy: [id number], 1 tab(s) po daily  Documented Medications  Documented  Excedrin: 2 tab(s), po, q6 hrs, 0 Refill(s), Type:  Maintenance  occuvite- eye vitamin: occuvite- eye vitamin, Supply, 0 Refill(s), Type: Maintenance   Problem list:    All Problems  Benign Essential Hypertension / SNOMED CT 1160656 / Confirmed  Colon Adenomas / ICD-9-.3 / Confirmed  Obese / ICD-9-.00 / Probable  CORBY (Obstructive Sleep Apnea) / SNOMED CT 2734870216 / Confirmed      Histories   Past Medical History:    No active or resolved past medical history items have been selected or recorded.   Family History:    Cancer  Brother ()     Procedure history:    Phacoemulsification (SNOMED CT 1231412706) performed by Edmond Munguia MD on 2015 at 69 Years.  Comments:  2015 7:56 AM - Ignacia Potter  Left.  Phacoemulsification (SNOMED CT 0745985825) performed by Edmond Munguia MD on 2015 at 69 Years.  Comments:  2015 9:51 AM - Ignacia Potter  Right.  Colonoscopy (SNOMED CT 559425471) performed by Jose Ham MD on 10/23/2013 at 68 Years.  Comments:  10/23/2013 10:42 [facility] - Jose Ham MD  Pedunculated polyp 25 cm, snared, not retrieved. Severe left-sided diverticulosis. Repeat 5 years.  Colonoscopy (SNOMED CT 259464505) performed by Jose Ham MD on 10/22/2008 at 63 Years.  Comments:  10/23/2013 10:41 Jose Pierce MD  1 adenoma., left-sided diverticulosis, rectal AVM. Repeat 5 years.  Colonoscopy (SNOMED CT 597016827) in  at 57 Years.  Lower Back Surgery.   Social History:        Alcohol Assessment: Past            Past, Beer (12 oz)      Tobacco Assessment: Past            Past, Pipe, Started age 12 Years.  Stopped age 43 Years.      Substance Abuse Assessment: Denies Substance Abuse            Never      Employment and Education Assessment            Retired, Work/School description: [employer].      Home and Environment Assessment            Marital status: .  Spouse/Partner name: Josey.  Lives with Spouse.  2 children.  Living situation:               Home/Independent.  Smoker in household: No.       Nutrition and Health Assessment            Type of diet: Regular.      Exercise and Physical Activity Assessment            Exercise frequency: Never.      Sexual Assessment            Sexually active: No.  Sexual orientation: Heterosexual.        Physical Examination   Vital Signs   4/4/2017 10:23 AM CDT Temperature Tympanic 98.8 DegF    Peripheral Pulse Rate 72 bpm    Pulse Site Radial artery    HR Method Manual    Systolic Blood Pressure 138 mmHg    Diastolic Blood Pressure 86 mmHg    Mean Arterial Pressure 103 mmHg    BP Site Left arm    BP Method Manual      General:  Alert and oriented, No acute distress.    Respiratory:  Lungs are clear to auscultation.    Cardiovascular:  Normal rate.    Gastrointestinal:  Soft, Non-tender.    Musculoskeletal:       Mobility/ gait: Able to walk with moderate assistance, Able to walk with a cane.         Spine/torso exam: Lumbar ( Bilateral, Tenderness, Straight leg raising, sitting/distracted ( Positive ) ).       Impression and Plan   Diagnosis     Chronic low back pain (ELZ53-DX M54.5).     Course:  Not improving.    Orders     Orders   Requests (Consults / Referrals):  Referral (Request) (Order): 4/4/2017 10:57 AM CDT, Referred to: Orthopaedics, Referred to: Dr. Rogers, Reason for referral: Persistent Back Pain not responsive to conservative RX, Chronic low back pain.     Orders (Selected)   Outpatient Orders  Ordered  MRI Lumbar Spine (Request): Instructions: W/O CONTRAST, Chronic low back pain.     Diagnosis     More than half of a 25 minute visit spent on counseling on above problem..

## 2022-02-16 NOTE — PROGRESS NOTES
Patient:   ZHANE HOLBROOK            MRN: [id number]            FIN: [id number]               Age:   73 years     Sex:  Male     :  1945   Associated Diagnoses:   Left knee DJD   Author:   Mike Strong MD      Visit Information      Date of Service: 2018 11:19 am  Performing Location: [facility]  Encounter#: [id number]      Primary Care Provider (PCP):  Mike Strong MD    NPI# [id number]      Referring Provider:  Mike Strong MD    NPI# [id number]      Chief Complaint   2018 11:20 AM CDT    Follow up left knee issues     Chief complaint and symptoms noted above confirmed with patient.      History of Present Illness             The patient presents with a knee problem.  The location of the knee problem is the left knee.  The knee problem is characterized by aching.  The severity of the knee problem is moderate.  The timing/course of symptom(s) associated with the knee problem is constant.  Had good improvement with injection for only a day or 2..  Exacerbating factors consist of none.  Relieving factors consist of medication.        Review of Systems   Constitutional:  Negative.    Musculoskeletal:  Negative except as documented in history of present illness.       Health Status   Allergies:    Allergic Reactions (Selected)  No Known Medication Allergies      Physical Examination   Vital Signs   2018 11:20 AM CDT Temperature Tympanic 97.9 DegF    Peripheral Pulse Rate 64 bpm    Pulse Site Radial artery    HR Method Manual    Systolic Blood Pressure 132 mmHg  HI    Diastolic Blood Pressure 78 mmHg    Mean Arterial Pressure 96 mmHg    BP Site Right arm    BP Method Manual      Measurements from flowsheet : Measurements   2018 11:20 AM CDT Height Measured - Standard 67.5 in    Weight Measured - Standard 196 lb    BSA 2.06 m2    Body Mass Index 30.24 kg/m2  HI      General:  Alert and oriented, No acute distress.    Musculoskeletal:       Lower extremity exam:  Knee ( Left, Tenderness ).       Review / Management   Radiology results   X-ray, Reviewed      Impression and Plan   Diagnosis     Left knee DJD (LCZ18-LB M17.12).     Course:  Progressing as expected.    Orders     Orders   Requests (Consults / Referrals):  Referral (Request) (Order): 9/4/2018 11:36 AM CDT, Referred to: Orthopaedics, Referred to: [facility], Reason for referral: Left knee pain, looking for TKA vs Synvisc, Left knee DJD.

## 2022-02-16 NOTE — PROGRESS NOTES
Patient:   ZHANE HOLBROOK            MRN: [id number]            FIN: [id number]               Age:   72 years     Sex:  Male     :  1945   Associated Diagnoses:   Impacted cerumen   Author:   Tae COOPER, Mike      Procedure   Ear foreign body removal procedure   Date:  2017.     Confirmed: patient.     Performed by: self.     Informed consent: Verbally obtained.     Indication: ear fullness, hearing disturbance.     Location: left ear, right ear.     Preparation and technique: positioned sitting upright, method including (cerumen loop, irrigation with warm tap water).     Procedure tolerated: well.     Impacted Cerumen Removed.     No Complications.        Impression and Plan   Diagnosis     Impacted cerumen (MZN01-YQ H61.23).     Orders     F/U  if not improving, sooner if getting worse.

## 2022-02-16 NOTE — TELEPHONE ENCOUNTER
---------------------  From: Keerthi Nguyễn (Phone [employer] Pool ([id number])   To: Mike Strong MD;     Sent: 6/25/2019 1:37:48 PM CDT  Subject: Rx refills     Phone Message    PCP: [facility]    Time of Call: 1230    Phone Number: [phone]      Note: pt called wondering if [facility] would refill his lisinopril and Meclizine    Last office visit and reason: 5/21/19 back pain    Transferred to: T  ** Submitted: **  Order:meclizine (meclizine 25 mg oral tablet)  1 tab(s)  po  tid  Qty:  90 tab(s)        Refills:  0          Substitutions Allowed     PRN  as needed for dizziness      Route To [facility] 80756    Signed by Mike Strong MD  6/25/2019 1:44:00 PM    ** Submitted: **  Order:lisinopril (lisinopril 10 mg oral tablet)  1 tab(s)  PO  Daily  Qty:  30 tab(s)        Refills:  0          Substitutions Allowed     Route To [facility] 36721    Signed by Mike Strong MD  6/25/2019 1:44:00 PM---------------------  From: Mike Strong MD   To: Phone Messages Pool ([id number] [facility];     Sent: 6/25/2019 1:45:00 PM CDT  Subject: RE: Rx refills     OK for a month.  Needs to be seen.Called pt back at 1439 to notify them. No VM left due to no VM.

## 2022-02-16 NOTE — PROGRESS NOTES
Patient:   ZHANE HOLBROOK            MRN: [id number]            FIN: [id number]               Age:   72 years     Sex:  Male     :  1945   Associated Diagnoses:   Medicare annual wellness visit, initial; Hearing loss   Author:   Tae COOPER [place]      Visit Information      Care Providers  (2017 10:58 am)  Dr. Ramos, Surgeon  Dr. Hsieh, Dentist, [place]  Dr. Marilee De La Cruz, Eye doctor, [place]  Ancillary Services  (2017 10:58 am)  [facility], Pharmacy, [place]        Current Visit Date:  2017          Chief Complaint   2017 10:58 AM CST   AWV, fasting for labs; c/o R ear sharp pain x 2 weeks, additional concerns     Chief complaint and symptoms noted above confirmed with patient.      History of Present Illness             The patient presents for Medicare annual wellness exam.  The patient's general health status is described as unchanged and stable.  The patient's diet is described as balanced.  Exercise occasional.  Associated symptoms consist of denies shortness of breath and denies weight loss.        Review of Systems   Constitutional:  Negative except as documented in history of present illness.    Eye:  Negative.    Ear/Nose/Mouth/Throat:  Decreased hearing, Hearing is evaluated.    See completed Health History for Review of Systems   Respiratory:  Apnea.    Cardiovascular:  Negative.    Gastrointestinal:  Negative.    Genitourinary:  Was followed by urology but has not seen in awhile.    Endocrine:  Negative.    Musculoskeletal:  Negative.    Integumentary:  Negative.    Neurologic:  Negative.    Psychiatric:  Negative.       Health Status   Allergies:    Allergic Reactions (Selected)  No Known Medication Allergies   Medications:  (Selected)   Prescriptions  Prescribed  lisinopril 10 mg oral tablet: 1 tab(s) ( 10 mg ), PO, Daily, # 90 tab(s), 3 Refill(s), Type: Maintenance, [id number]  Documented Medications  Documented  Excedrin: 2 tab(s), po, q6 hrs, 0  Refill(s), Type: Maintenance   Problem list:    All Problems  Benign Essential Hypertension / SNOMED CT 3267355 / Confirmed  Chronic low back pain / SNOMED CT 363960316 / Confirmed  Colon Adenomas / ICD-9-.3 / Confirmed  Obese / ICD-9-.00 / Probable  CORBY (Obstructive Sleep Apnea) / SNOMED CT 2382446047 / Confirmed   Medicare Assessments      Fall Risk Screen  Not recorded for selected visit.     Functional Assessment  Not recorded for selected visit.       Geriatric Depression Screen  Not recorded for selected visit.     Hearing Screen  Not recorded for selected visit.     Home Safety Screen  Not recorded for selected visit.     Vision Screen  (2017 10:58 am)       Eye, Right Visual Acuity Evaluated:  20/30       Eye, Left Visual Acuity Evaluated:  20/30       Vision Screen Comments:  20/25 bothcorrected     ADL's and Safety reviewed with patient.      Histories   Past Medical History:    Active  Colon Adenomas (ICD-9-.3)  Benign Essential Hypertension (SNOMED CT 7664451)  CORBY (Obstructive Sleep Apnea) (SNOMED CT 7978969996)  Chronic low back pain (SNOMED CT 265179743)   Family History:    Cancer  Brother ()     Procedure history:    Facetectomy of vertebra (SNOMED CT 0801840) performed by Efrain Ramos MD on 7/10/2017 at 72 Years.  Comments:  2017 2:23 PM - Ignacia Potter  Right L3-4.  Phacoemulsification (SNOMED CT 3921442934) performed by Edmond Munguia MD on 2015 at 69 Years.  Comments:  2015 7:56 AM - Ignacia Potter  Left.  Phacoemulsification (SNOMED CT 3047869304) performed by Edmond Munguia MD on 2015 at 69 Years.  Comments:  2015 9:51 AM - Ignacia Potter  Right.  Colonoscopy (SNOMED CT 742419953) performed by Jose Ham MD on 10/23/2013 at 68 Years.  Comments:  10/23/2013 10:42 AM - Jose Ham MD  Pedunculated polyp 25 cm, snared, not retrieved. Severe left-sided diverticulosis. Repeat 5 years.  Colonoscopy (SNOMED CT 678872482) performed by  Jose Ham MD on 10/22/2008 at 63 Years.  Comments:  10/23/2013 10:41 AM - Jose Ham MD  1 adenoma., left-sided diverticulosis, rectal AVM. Repeat 5 years.  Colonoscopy (SNOMED CT 263777753) in 2002 at 57 Years.  Lower Back Surgery.   Social History:        Alcohol Assessment: Past            Past, Beer (12 oz)      Tobacco Assessment: Past            Past, Pipe, Started age 12 Years.  Stopped age 43 Years.      Substance Abuse Assessment: Denies Substance Abuse            Never      Employment and Education Assessment            Retired, Work/School description: [employer].      Home and Environment Assessment            Marital status: .  Spouse/Partner name: Josey.  Lives with Spouse.  2 children.  Living situation:               Home/Independent.  Smoker in household: No.      Nutrition and Health Assessment            Type of diet: Regular.      Exercise and Physical Activity Assessment            Exercise frequency: Never.      Sexual Assessment            Sexually active: No.  Sexual orientation: Heterosexual.        Physical Examination   Vital Signs   12/7/2017 10:58 AM CST Temperature Tympanic 98.2 DegF    Peripheral Pulse Rate 88 bpm    Pulse Site Radial artery    HR Method Manual    Systolic Blood Pressure 150 mmHg  HI    Diastolic Blood Pressure 86 mmHg    Mean Arterial Pressure 107 mmHg    BP Site Left arm    BP Method Manual      Measurements from flowsheet : Measurements   12/7/2017 10:58 AM CST Height Measured 67.5 in    Weight Measured 198.8 lb    BSA 2.07 m2    Body Mass Index 30.67 kg/m2      Documented vital signs:       Blood Pressure: Systolic  138  mmHg, Diastolic  82  mmHg.    General:  Alert and oriented, No acute distress.    Eye:  Pupils are equal, round and reactive to light, Normal conjunctiva.    HENT:  Tympanic membranes are clear, Oral mucosa is moist, Failed hearing Screen after flushing ears..    Neck:  Supple, Non-tender, No carotid bruit, No lymphadenopathy.     Respiratory:  Respirations are non-labored.    Cardiovascular:  Normal rate, Regular rhythm, No edema.    Gastrointestinal:  Soft, Non-tender, Non-distended.    Musculoskeletal:  Normal range of motion, Normal gait.    Integumentary:  Warm, No rash.    Neurologic:  Alert, Oriented, No focal deficits.    Cognition and Speech:  Oriented, Speech clear and coherent, Functional cognition intact.    Psychiatric:  Cooperative, Appropriate mood & affect, Normal judgment, Patient's GDS and CAGE questionnaire reviewed and discussed with patient. .       Impression and Plan   Course:  Progressing as expected.    Plan:  Discussed  preventative services.  Appropriate weight and diet discussed.  Discussed Advance Life Directives.    Preventative services needed::  Preventative services checklist reviewed, updated and copy given to patient.  Please see scanned document.         HIV risk screening: No.    Patient Instructions:          Limitations: Limit caffeine intake, Limit alcohol consumption.         Counseled: Patient, Regarding treatment, Regarding medications, Diet, Activity, Verbalized understanding.    Orders     Orders   Lab (Gen Lab  Reference Lab):  Basic Metabolic Panel* ([facility]) (Order): Specimen Type: Serum, Collection Date: 12/7/2017 11:55 AM CST  CBC (h/h, RBC, indices, WBC, Plt)* ([facility]) (Order): Specimen Type: Blood, Collection Date: 12/7/2017 11:55 AM CST  PSA, Total (Room)* ([facility]) (Order): Specimen Type: Serum, Collection Date: 12/7/2017 11:55 AM CST.     Diagnosis     Medicare annual wellness visit, initial (TXK98-CF Z00.00).     Diagnosis     Hearing loss (FUB91-GW H91.90).     Orders     Orders   Requests (Consults / Referrals):  Referral (Request) (Order): 12/7/2017 11:49 AM CST, Referred to: Audiology, Hearing loss.

## 2022-02-16 NOTE — PROGRESS NOTES
Chief Complaint    Patient is here today with lower back pain. Hx of back pain. Concerned about kidney stones.  History of Present Illness      Patient is here with chronic back pain for many years duration somewhat worse in the past couple of weeks.  He has chronic pain in the left leg and foot drop on the left.  He has had a couple of back operations.  He has a history of a nonischemic cardiomyopathy which resolved.  Recently saw his cardiologist.  No fever or history of cancer.  No falls.  Chronically walks with a cane.  Patient is very sedentary spends a great deal of his time in a chair.  Is been more active in the past couple of weeks which she thinks may have provoked his increased pain.  Review of Systems      No edema, PND, orthopnea.  Physical Exam   Vitals & Measurements    T: 97.6   F (Tympanic)  HR: 61(Peripheral)  BP: 124/62  SpO2: 98%     WT: 208.8 lb       Patient appears comfortable.  Walks well with his cane though he does have a foot drop on the left.  Alert and oriented.  Chest clear.  Cardiac exam regular.  No edema.  Back has a scar in the lumbar area but no tenderness.  Weakness of the left foot dorsiflexor strength is otherwise intact in both legs.  Sensation is absent to vibration in both feet but present in both feet to monofilament.  Assessment/Plan       Chronic low back pain (M54.5)         Chronic low back pain with chronic radiculopathy on the left including foot drop.  Patient is chronically sedentary and I think this contributes to his symptoms.  Patient can continue his Excedrin as needed for pain.  Physical therapy.  Follow-up if not improving.         Ordered:          31962 office outpatient visit 15 minutes (Charge), Quantity: 1, Chronic low back pain          Physical Therapy (Request), Instructions: Eval and treat, Chronic low back pain                Nonischemic cardiomyopathy (I42.8)           Patient Information     Name:ZHANE HOLBROOK      Address:      [address] [place] [address]     Sex:Male     YOB: 1945     [phone]     Emergency Contact:[facility], CONTACT     MRN:[id number]     FIN:[id number]     Location:[facility]     Date of Service:05/21/2019      Primary Care Physician:       Mike Strong MD, [phone]      Attending Physician:       Jose Ham MD, [phone]  Problem List/Past Medical History    Ongoing     Benign Essential Hypertension     Chronic low back pain     Colon adenomas     Nonischemic cardiomyopathy     Obese     CORBY (Obstructive Sleep Apnea)       Comments: On 5/16/18 CPAP titration to 7. On 4/30/14 severe CORBY with AHI 81 ans oximetry naidir 64%. Optimal PAP presure not found.    Historical     Inpatient stay       Comments: [facility] - Chest pain.     Inpatient stay       Comments: @[place], [place] - Left knee hemiarthroplasty  Procedure/Surgical History     Arthroplasty (12/31/2018)      Comments: left knee hemiarthroplasty.     Facetectomy of vertebra (07/10/2017)      Comments: Right L3-4..     Phacoemulsification (02/12/2015)      Comments: Left..     Phacoemulsification (01/30/2015)      Comments: Right..     Colonoscopy (10/23/2013)      Comments: Pedunculated polyp 25 cm, snared, not retrieved. Severe left-sided diverticulosis. Repeat 5 years..     Colonoscopy (10/22/2008)      Comments: 1 adenoma., left-sided diverticulosis, rectal AVM. Repeat 5 years..     Colonoscopy (2002)     Lower Back Surgery  Medications        CPAP +7 cm H2o: See Instructions, Heated humidifier, heated tubing, filters, nasal or full face mask of choice with headgear.  Replacement cushions and supplies as needed.  Months = 99 (Lifetime), 1 EA, 0 Refill(s).        lisinopril 10 mg oral tablet: 10 mg, 1 tab(s), PO, Daily, 90 tab(s), 3 Refill(s).        meclizine 25 mg oral tablet: 25 mg, 1 tab(s), po, tid, PRN: as needed for dizziness, 30 tab(s), 2 Refill(s).         metoprolol succinate 100 mg oral tablet, extended release: 100 mg, 1 tab(s), po, daily, Per [facility] 4/12/2018, 90 tab(s), 3 Refill(s).         Allergies    No Known Medication Allergies  Social History    Smoking Status - 05/21/2019     Never smoker     Alcohol - Past, 02/15/2012      Past, Beer (12 oz), 02/15/2012     Employment and Education      Retired, Work/School description: [employer] 06/16/2017     Exercise and Physical Activity      Exercise frequency: Never., 02/15/2012     Home and Environment      Marital status: . Spouse/Partner name: Josey. Lives with Spouse. 2 children. Living situation: Home/Independent. Smoker in household: No., 02/15/2012     Nutrition and Health      Type of diet: Regular., 02/15/2012     Sexual      Sexually active: No. Sexual orientation: Heterosexual., 11/18/2014     Substance Abuse - Denies Substance Abuse, 11/18/2014      Never, 11/18/2014     Tobacco - Past, 03/28/2013      Past, Pipe, Started age 12 Years. Stopped age 43 Years., 02/15/2012  Family History    Cancer: Brother.  Immunizations      Vaccine Date Status      ZOS, shingles 09/28/2015 Recorded      pneumococcal (PCV13) 08/18/2015 Given      influenza virus vaccine, inactivated 09/25/2009 Recorded      Td 05/06/2005 Recorded

## 2022-02-16 NOTE — NURSING NOTE
Comprehensive Intake Entered On:  1/3/2019 10:46 AM CST    Performed On:  1/3/2019 10:38 AM CST by Liza Stone MA               Summary   Chief Complaint :   Follow up knee, Change bandage    Menstrual Status :   N/A   Weight Measured :   205.8 lb(Converted to: 205 lb 13 oz, 93.35 kg)    Height Measured :   67.5 in(Converted to: 5 ft 7 in, 171.45 cm)    Body Mass Index :   31.75 kg/m2 (HI)    Body Surface Area :   2.11 m2   Systolic Blood Pressure :   140 mmHg (HI)    Diastolic Blood Pressure :   80 mmHg   Mean Arterial Pressure :   100 mmHg   Peripheral Pulse Rate :   64 bpm   BP Site :   Right arm   Pulse Site :   Radial artery   BP Method :   Manual   HR Method :   Manual   Liza Stone MA - 1/3/2019 10:38 AM CST   Health Status   Allergies Verified? :   Yes   Medication History Verified? :   Yes   Immunizations Current :   Yes   Medical History Verified? :   Yes   Pre-Visit Planning Status :   Completed   Tobacco Use? :   Former smoker   Liza Stone MA - 1/3/2019 10:38 AM CST   Demographics   Last Name :   Faustina   Address :    [address]   First Name :   Sheldon Vazquez Initial :   [facility]   Responsible Party Date of Birth () :   1945 CST   City :   [place]   State :   [place]   Zip Code :   [address]   Liza Stone MA - 1/3/2019 10:38 AM CST   Providers Grid   Provider Name :    Dr. Richard De La Cruz        Provider Specialty :    Surgeon   Dentist   Eye doctor        Reason for Seeing Provider :       Liza Flores MA - 1/3/2019 10:38 AM CST  Liza Stone MA - 1/3/2019 10:38 AM CST  Liza Stone MA - 1/3/2019 10:38 AM CST       Ancillary Services Grid   Name :    [facility]              Type of Service :    Pharmacy              Contact Information :    Liza France MA - 1/3/2019 10:38 AM CST         Consents   Consent for Immunization Exchange :   Consent Granted   Consent for Immunizations to Providers :   Consent  Granted   Liza Stone MA - 1/3/2019 10:38 AM CST   Problems   (As Of: 1/3/2019 10:46:13 AM CST)   Problems(Active)    Benign Essential Hypertension (SNOMED CT  :5662750 )  Name of Problem:   Benign Essential Hypertension ; Recorder:   Rizwan Noe PA-C; Confirmation:   Confirmed ; Classification:   Medical ; Code:   8355542 ; Contributor System:   PowerChart ; Last Updated:   7/28/2017 12:06 PM CDT ; Life Cycle Date:   5/27/2014 ; Life Cycle Status:   Active ; Responsible Provider:   Rizwan Noe PA-C; Vocabulary:   SNOMED CT        Chronic low back pain (SNOMED CT  :201026076 )  Name of Problem:   Chronic low back pain ; Recorder:   Mike Strong MD; Confirmation:   Confirmed ; Classification:   Medical ; Code:   754174847 ; Contributor System:   PowerChart ; Last Updated:   7/28/2017 12:06 PM CDT ; Life Cycle Status:   Active ; Responsible Provider:   Mike Strong MD; Vocabulary:   SNOMED CT        Colon adenomas (SNOMED CT  :2541254654 )  Name of Problem:   Colon adenomas ; Recorder:   Jose Ham MD; Confirmation:   Confirmed ; Classification:   Medical ; Code:   7391784090 ; Contributor System:   PowerChart ; Last Updated:   1/3/2019 10:03 AM CST ; Life Cycle Status:   Active ; Responsible Provider:   Jose Ham MD; Vocabulary:   SNOMED CT        Nonischemic cardiomyopathy (SNOMED CT  :566394977 )  Name of Problem:   Nonischemic cardiomyopathy ; Recorder:   Jose Ham MD; Confirmation:   Confirmed ; Classification:   Medical ; Code:   666684500 ; Contributor System:   PowerChart ; Last Updated:   4/20/2018 2:35 PM CDT ; Life Cycle Date:   4/20/2018 ; Life Cycle Status:   Active ; Responsible Provider:   Jose Ham MD; Vocabulary:   SNOMED CT        Obese (ICD-9-CM  :278.00 )  Name of Problem:   Obese ; Recorder:   SYSTEM; Confirmation:   Probable ; Classification:   Medical ; Code:   278.00 ; Last Updated:   2/28/2014 7:11 PM CST ; Life Cycle Date:   2/4/2011 ; Life  Cycle Status:   Active ; Vocabulary:   ICD-9-CM        CORBY (Obstructive Sleep Apnea) (SNOMED CT  :5323874588 )  Name of Problem:   CORBY (Obstructive Sleep Apnea) ; Onset Date:   4/30/2014 ; Recorder:   Rizwan Noe PA-C; Confirmation:   Confirmed ; Classification:   Medical ; Code:   1585004958 ; Contributor System:   [employer] ; Last Updated:   4/20/2018 11:46 AM CDT ; Life Cycle Status:   Active ; Responsible Provider:   Rizwan Noe PA-C; Vocabulary:   SNOMED CT   ; Comments:        4/20/2018 11:46 AM - Jose Ham MD  On 4/30/14 severe CORBY with AHI 81 ans oximetry naidir 64%. Optimal PAP presure not found.    5/29/2018 8:59 AM - Jose Ham MD  On 5/16/18 CPAP titration to 7.        Meds / Allergies   (As Of: 1/3/2019 10:46:13 AM CST)   Allergies (Active)   No Known Medication Allergies  Estimated Onset Date:   Unspecified ; Created By:   Daina Alvarez CMA; Reaction Status:   Active ; Category:   Drug ; Substance:   No Known Medication Allergies ; Type:   Allergy ; Updated By:   Daina Alvarez CMA; Reviewed Date:   1/3/2019 10:44 AM CST        Medication List   (As Of: 1/3/2019 10:46:13 AM CST)   Prescription/Discharge Order    metoprolol  :   metoprolol ; Status:   Prescribed ; Ordered As Mnemonic:   metoprolol succinate 100 mg oral tablet, extended release ; Simple Display Line:   100 mg, 1 tab(s), po, daily, Per [facility] 4/12/2018, 90 tab(s), 3 Refill(s) ; Ordering Provider:   Mike Storng MD; Catalog Code:   metoprolol ; Order Dt/Tm:   7/27/2018 8:27:53 AM          atorvastatin  :   atorvastatin ; Status:   Prescribed ; Ordered As Mnemonic:   atorvastatin 40 mg oral tablet ; Simple Display Line:   40 mg, 1 tab(s), po, daily, Per [facility] 4/12/2018, 90 tab(s), 3 Refill(s) ; Ordering Provider:   Mike Strong MD; Catalog Code:   atorvastatin ; Order Dt/Tm:   7/27/2018 8:27:52 AM          meclizine  :   meclizine ; Status:   Prescribed ; Ordered As Mnemonic:   meclizine 25 mg oral tablet  ; Simple Display Line:   25 mg, 1 tab(s), po, tid, PRN: as needed for dizziness, 30 tab(s), 2 Refill(s) ; Ordering Provider:   Mike Strong MD; Catalog Code:   meclizine ; Order Dt/Tm:   7/27/2018 8:27:51 AM          lisinopril  :   lisinopril ; Status:   Prescribed ; Ordered As Mnemonic:   lisinopril 10 mg oral tablet ; Simple Display Line:   10 mg, 1 tab(s), PO, Daily, 90 tab(s), 3 Refill(s) ; Ordering Provider:   Mike Strong MD; Catalog Code:   lisinopril ; Order Dt/Tm:   7/27/2018 8:27:50 AM          Miscellaneous Rx Supply  :   Miscellaneous Rx Supply ; Status:   Prescribed ; Ordered As Mnemonic:   CPAP +7 cm H2o ; Simple Display Line:   See Instructions, Heated humidifier, heated tubing, filters, nasal or full face mask of choice with headgear.  Replacement cushions and supplies as needed.  Months = 99 (Lifetime), 1 EA, 0 Refill(s) ; Ordering Provider:   Jose Ham MD; Catalog Code:   Miscellaneous Rx Supply ; Order Dt/Tm:   5/29/2018 9:03:09 AM            Home Meds    celecoxib  :   celecoxib ; Status:   Documented ; Ordered As Mnemonic:   CeleBREX ; Simple Display Line:   Oral, 0 Refill(s) ; Catalog Code:   celecoxib ; Order Dt/Tm:   1/3/2019 10:42:10 AM          methylphenidate  :   methylphenidate ; Status:   Documented ; Ordered As Mnemonic:   Con[place]a ; Simple Display Line:   Oral, qam, 0 Refill(s) ; Catalog Code:   methylphenidate ; Order Dt/Tm:   1/3/2019 10:43:23 AM          oxyCODONE  :   oxyCODONE ; Status:   Documented ; Ordered As Mnemonic:   oxyCODONE ; Simple Display Line:   Oral, 0 Refill(s) ; Catalog Code:   oxyCODONE ; Order Dt/Tm:   1/3/2019 10:43:16 AM          rivaroxaban  :   rivaroxaban ; Status:   Documented ; Ordered As Mnemonic:   Xarelto ; Simple Display Line:   Oral, 0 Refill(s) ; Catalog Code:   rivaroxaban ; Order Dt/Tm:   1/3/2019 10:42:29 AM

## 2022-02-16 NOTE — NURSING NOTE
Comprehensive Intake Entered On:  4/30/2020 10:30 AM CDT    Performed On:  4/30/2020 10:24 AM CDT by Liza Stone MA               Summary   Chief Complaint :   Elevated bloodpressure 230/106 yesterday 3pm, Dizzy and  lightheaded this morning, Had not been using Cpap for 3 months, did use it last night, Dr Munguia advised patient to double lisinopril dosage; Verbal permission for telephone visit   Menstrual Status :   N/A   Liza Stone MA - 4/30/2020 10:24 AM CDT   Health Status   Allergies Verified? :   Yes   Medication History Verified? :   Yes   Immunizations Current :   Yes   Medical History Verified? :   Yes   Pre-Visit Planning Status :   Completed   Tobacco Use? :   Never smoker   Liza Stone MA - 4/30/2020 10:24 AM CDT   Consents   Consent for Immunization Exchange :   Consent Granted   Consent for Immunizations to Providers :   Consent Granted   Liza Stone MA - 4/30/2020 10:24 AM CDT   Meds / Allergies   (As Of: 4/30/2020 10:30:09 AM CDT)   Allergies (Active)   No Known Medication Allergies  Estimated Onset Date:   Unspecified ; Created By:   Daina Alvarez CMA; Reaction Status:   Active ; Category:   Drug ; Substance:   No Known Medication Allergies ; Type:   Allergy ; Updated By:   Daina Alvarez CMA; Reviewed Date:   4/30/2020 10:29 AM CDT        Medication List   (As Of: 4/30/2020 10:30:09 AM CDT)   Prescription/Discharge Order    metoprolol  :   metoprolol ; Status:   Prescribed ; Ordered As Mnemonic:   metoprolol succinate 100 mg oral tablet, extended release ; Simple Display Line:   50 mg, 0.5 tab(s), po, daily, Per [facility] 4/12/2018, 90 tab(s), 3 Refill(s) ; Ordering Provider:   Rizwan Noe PA-C; Catalog Code:   metoprolol ; Order Dt/Tm:   7/25/2019 10:32:37 AM CDT          lisinopril  :   lisinopril ; Status:   Prescribed ; Ordered As Mnemonic:   lisinopril 10 mg oral tablet ; Simple Display Line:   10 mg, 1 tab(s), PO, Daily, 90 tab(s), 3 Refill(s) ; Ordering Provider:   Kusum  Rizwan GO; Catalog Code:   lisinopril ; Order Dt/Tm:   7/25/2019 10:32:36 AM CDT          atorvastatin  :   atorvastatin ; Status:   Prescribed ; Ordered As Mnemonic:   atorvastatin 40 mg oral tablet ; Simple Display Line:   40 mg, 1 tab(s), Oral, daily, 90 tab(s), 3 Refill(s) ; Ordering Provider:   Rizwan Noe PA-C; Catalog Code:   atorvastatin ; Order Dt/Tm:   7/25/2019 10:32:18 AM CDT          meclizine  :   meclizine ; Status:   Prescribed ; Ordered As Mnemonic:   meclizine 25 mg oral tablet ; Simple Display Line:   25 mg, 1 tab(s), po, tid, PRN: as needed for dizziness, 90 tab(s), 0 Refill(s) ; Ordering Provider:   Mike Strong MD; Catalog Code:   meclizine ; Order Dt/Tm:   6/25/2019 1:44:32 PM CDT          Miscellaneous Rx Supply  :   Miscellaneous Rx Supply ; Status:   Prescribed ; Ordered As Mnemonic:   CPAP +7 cm H2o ; Simple Display Line:   See Instructions, Heated humidifier, heated tubing, filters, nasal or full face mask of choice with headgear.  Replacement cushions and supplies as needed.  Months = 99 (Lifetime), 1 EA, 0 Refill(s) ; Ordering Provider:   Jose Ham MD; Catalog Code:   Miscellaneous Rx Supply ; Order Dt/Tm:   5/29/2018 9:03:09 AM CDT            ID Risk Screen   Recent Travel History :   No recent travel   Family Member Travel History :   No recent travel   Other Exposure to Infectious Disease :   Unknown   Liza Stone MA - 4/30/2020 10:24 AM CDT

## 2022-02-16 NOTE — TELEPHONE ENCOUNTER
Entered by Rizwan Noe PA-C on November 11, 2021 8:24:20 AM CST  ---------------------  From: Rizwan Noe PA-C   To: [facility]    Sent: 11/11/2021 8:24:20 AM CST  Subject: Medication Management     ** Submitted: **  Complete:metoprolol (Metoprolol Succinate  mg oral tablet, extended release)   Signed by Rizwan Noe PA-C  11/11/2021 2:24:00 PM [facility]    ** Approved with modifications: **  metoprolol (metoprolol succinate  mg tablet,extended release 24 hr)  TAKE ONE Tablet BY MOUTH EVERY DAY  Qty:  90 tab(s)        Days Supply:  90        Refills:  3          Substitutions Allowed     Route To Pharmacy - [facility]   Note from Pharmacy:  This prescription was filled on 8/19/2021. Any refills authorized will be placed on file.              ------------------------------------------  From: [facility]  To: Rizwan Noe PA-C  Sent: November 11, 2021 8:04:33 AM CST  Subject: Medication Management  Due: October 21, 2021 8:18:04 PM CDT     ** On Hold Pending Signature **     Drug: metoprolol (Metoprolol Succinate  mg oral tablet, extended release), 1 tab(s) Oral daily  Quantity: 90 tab(s)  Days Supply: 0  Refills: 2  Substitutions Allowed  Notes from Pharmacy:     Dispensed Drug: metoprolol (Metoprolol Succinate  mg oral tablet, extended release), TAKE ONE Tablet BY MOUTH EVERY DAY  Quantity: 90 tab(s)  Days Supply: 90  Refills: 3  Substitutions Allowed  Notes from Pharmacy: This prescription was filled on 8/19/2021. Any refills authorized will be placed on file.  ------------------------------------------

## 2022-02-16 NOTE — PROGRESS NOTES
Patient:   ZHANE HOLBROOK            MRN: [id number]            FIN: [id number]               Age:   75 years     Sex:  Male     :  1945   Associated Diagnoses:   Hypertension   Author:   Mike Strong MD      Visit Information      Date of Service: 2020 10:52 am  Performing Location: [facility]  Encounter#: [id number]      Primary Care Provider (PCP):  Mike Strong MD    NPI# [id number]      Referring Provider:  Mike Strong MD    NPI# [id number]      Chief Complaint       2020 10:53 AM CDT Elevated blood pressure   2020 10:24 AM CDT Elevated bloodpressure 230/106 yesterday 3pm, Dizzy and  lightheaded this morning, Had not been using Cpap for 3 months, did use it last night, Dr Munguia advised patient to double lisinopril dosage; Verbal permission for telephone visit      Chief complaint and symptoms noted above confirmed with patient.      History of Present Illness             The patient presents for follow-up evaluation of hypertension.  The quality of hypertension symptom(s) since the patient's last visit is described as decreased, after restarting CPAP and increasing medications..  The severity of the hypertension symptom(s) since the last visit is moderate.  Since the patient's last visit, the timing/course of hypertension symptom(s) is constant.  Exacerbating factors consist of none.  Relieving factors consist of medication and CPAP.  Associated symptoms consist of vision changes.  Medical encounters: Was at Ophthalmology see above.        Review of Systems   Constitutional:  Negative.    Respiratory:  Negative.    Cardiovascular:  Negative except as documented in history of present illness.       Health Status   Allergies:    Allergic Reactions (Selected)  No Known Medication Allergies   Medications:  (Selected)   Prescriptions  Prescribed  CPAP +7 cm H2o: CPAP +7 cm H2o, See Instructions, Instructions: Heated humidifier, heated tubing, filters,  nasal or full face mask of choice with headgear.  Replacement cushions and supplies as needed.  Months = 99 (Lifetime), Supply, # 1 EA, 0 Refill(s), Type: Maint...  Metoprolol Succinate  mg oral tablet, extended release: = 1 tab(s) ( 100 mg ), Oral, daily, # 90 tab(s), 3 Refill(s), Type: Maintenance, Pharmacy: [facility] , 1 tab(s) Oral daily,x90 day(s)  atorvastatin 40 mg oral tablet: = 1 tab(s) ( 40 mg ), Oral, daily, # 90 tab(s), 3 Refill(s), Type: Maintenance, Pharmacy: [id number], 1 tab(s) Oral daily  lisinopril 20 mg oral tablet: = 1 tab(s) ( 20 mg ), Oral, daily, # 90 tab(s), 3 Refill(s), Type: Maintenance, Pharmacy: [facility] , 1 tab(s) Oral daily  meclizine 25 mg oral tablet: = 1 tab(s) ( 25 mg ), po, tid, PRN: as needed for dizziness, # 90 tab(s), 0 Refill(s), Type: Maintenance, Pharmacy: [id number], 1 tab(s) Oral tid,PRN:as needed for dizziness   Problem list:    All Problems (Selected)  Benign Essential Hypertension / SNOMED CT 9228981 / Confirmed  Chronic low back pain / SNOMED CT 404160164 / Confirmed  Colon adenomas / SNOMED CT 6273467646 / Confirmed  Nonischemic cardiomyopathy / SNOMED CT 869491211 / Confirmed  Obese / ICD-9-.00 / Probable  CORBY (Obstructive Sleep Apnea) / SNOMED CT 1907234998 / Confirmed      Histories   Past Medical History:    Active  CORBY (Obstructive Sleep Apnea) (SNOMED CT 9831877052): Onset on 4/30/2014 at 69 years.  Comments:  4/20/2018 CDT 11:46 AM Jose Rivero MD  On 4/30/14 severe CORBY with AHI 81 ans oximetry naidir 64%. Optimal PAP presure not found.    5/29/2018 CDT 8:59 AM Jose Rivero MD  On 5/16/18 CPAP titration to 7.  Colon adenomas (SNOMED CT 0465583581)  Benign Essential Hypertension (SNOMED CT 3973542)  Chronic low back pain (SNOMED CT 594264050)  Resolved  Inpatient stay (SNOMED CT 138845422): Onset on 12/31/2018 at 73 years.  Resolved on 1/2/2019 at 73  years.  Comments:  1/3/2019 CST 10:04 AM CST - Tab Agnes  @[facility] [place] - Left knee hemiarthroplasty  Inpatient stay (SNOMED CT 197642526): Onset on 2018 at 73 years.  Resolved on 2018 at 73 years.  Comments:  2018 CDT 8:24 AM CDT - Ignacia Potter  [facility] - Chest pain.   Family History:    Cancer  Brother ()     Procedure history:    Arthroplasty (SNOMED CT 4784559111) performed by Leon Florentino MD on 2018 at 73 Years.  Comments:  1/3/2019 10:07 AM GOPAL Barth  Agnes  left knee hemiarthroplasty  Facetectomy of vertebra (SNOMED CT 5806187) performed by Efrain Ramos MD on 7/10/2017 at 72 Years.  Comments:  2017 2:23 PM CDT - Ignacia Potter  Right L3-4.  Phacoemulsification (SNOMED CT 2091643016) performed by Edmond Munguia MD on 2015 at 69 Years.  Comments:  2015 7:56 AM Ignacia Parsons  Left.  Phacoemulsification (SNOMED CT 6337428526) performed by Edmond Munguia MD on 2015 at 69 Years.  Comments:  2015 9:51 AM Ignacia Parsons  Right.  Colonoscopy (SNOMED CT 632513025) performed by Jose Ham MD on 10/23/2013 at 68 Years.  Comments:  10/23/2013 10:42 AM CDT - Jose Ham MD  Pedunculated polyp 25 cm, snared, not retrieved. Severe left-sided diverticulosis. Repeat 5 years.  Colonoscopy (SNOMED CT 098251976) performed by Jose Ham MD on 10/22/2008 at 63 Years.  Comments:  10/23/2013 10:41 AM SHERIT - Jose Ham MD  1 adenoma., left-sided diverticulosis, rectal AVM. Repeat 5 years.  Colonoscopy (SNOMED CT 970909086) in  at 57 Years.  Lower Back Surgery.   Social History:        Alcohol Assessment: Past            Past, Beer (12 oz)      Tobacco Assessment: Past            Past, Pipe, Started age 12 Years.  Stopped age 43 Years.      Substance Abuse Assessment: Denies Substance Abuse            Never      Employment and Education Assessment            Retired, Work/School description: [employer].       Home and Environment Assessment            Marital status: .  Spouse/Partner name: Josey.  Lives with Spouse.  2 children.  Living situation:               Home/Independent.  Smoker in household: No.      Nutrition and Health Assessment            Type of diet: Regular.      Exercise and Physical Activity Assessment            Exercise frequency: Never.      Sexual Assessment            Sexually active: No.  Sexual orientation: Heterosexual.        Physical Examination   Vital Signs   5/1/2020 10:53 AM CDT Temperature Tympanic 97.9 DegF    Peripheral Pulse Rate 80 bpm    Pulse Site Radial artery    HR Method Manual    Systolic Blood Pressure 136 mmHg  HI    Diastolic Blood Pressure 82 mmHg  HI    Mean Arterial Pressure 100 mmHg    BP Site Right arm    BP Method Manual      Measurements from flowsheet : Measurements   5/1/2020 10:53 AM CDT Height Measured - Standard 67.5 in    Weight Measured - Standard 193 lb    BSA 2.04 m2    Body Mass Index 29.78 kg/m2  HI      HENT:  Normocephalic, Tympanic membranes are clear.    Neck:  Supple, Non-tender, No carotid bruit.    Respiratory:  Lungs are clear to auscultation, Respirations are non-labored, Breath sounds are equal.    Cardiovascular:  Normal rate, Regular rhythm, No murmur.    Gastrointestinal:  Soft, Non-tender, Non-distended.       Impression and Plan   Diagnosis     Hypertension (UFR90-FY I10).     Course:  Improving.    Orders     Orders (Selected)   Prescriptions  Prescribed  Metoprolol Succinate  mg oral tablet, extended release: = 1 tab(s) ( 100 mg ), Oral, daily, # 90 tab(s), 3 Refill(s), Type: Maintenance, Pharmacy: [facility] , 1 tab(s) Oral daily,x90 day(s)  lisinopril 20 mg oral tablet: = 1 tab(s) ( 20 mg ), Oral, daily, # 90 tab(s), 3 Refill(s), Type: Maintenance, Pharmacy: [facility] , 1 tab(s) Oral daily.     Will monitor BP and f/u in a month.  Reviewed how to use home BP machine and  calibrated it vs ours..        Professional Services   Counseling Summary:  This was a 25 minute visit with greater than 50% of that time spent counseling the patient, and coordination of care..    Counseling included differential diagnoses, treatment options, risks and benefits, lifestyle changes, current condition, medications, and dose adjustments.    The patient was interactive, attentive, asked questions, and verbalized understanding.

## 2022-02-16 NOTE — NURSING NOTE
Hearing and Vision Screening Entered On:  10/2/2019 3:41 PM CDT    Performed On:  10/2/2019 3:41 PM CDT by Jennifer Miles CMA               Hearing and Vision Screening   Hearing Screen Comments :   Pt has hearing aids   eJnnifer Miles CMA - 10/2/2019 3:41 PM CDT

## 2022-02-16 NOTE — PROGRESS NOTES
Patient:   ZHANE HOLBROOK            MRN: [id number]            FIN: [id number]               Age:   76 years     Sex:  Male     :  1945   Associated Diagnoses:   Pure hypercholesterolemia; Hypertension   Author:   Mike Strong MD      Visit Information      Date of Service: 2021 03:07 pm  Performing Location: [facility]  Encounter#: [id number]      Primary Care Provider (PCP):  Mike Strong MD    NPI# [id number]      Referring Provider:  Mike Strong MD    NPI# [id number]      Chief Complaint   2021 3:13 PM CST   Medical f/u, medication refill     Chief complaint and symptoms noted above confirmed with patient.      History of Present Illness             The patient presents for follow-up evaluation of hypertension.  The quality of hypertension symptom(s) since the patient's last visit is described as being unchanged.  The severity of the hypertension symptom(s) since the last visit is moderate.  Since the patient's last visit, the timing/course of hypertension symptom(s) is constant.  Exacerbating factors consist of none.  Relieving factors consist of medication.        Interval History   Cholesterol Management   Total cholesterol results Pending.  Associated symptoms characterized by no fatigue, chest pain, joint pain, muscle weakness or myalgias.        Review of Systems   Constitutional:  Negative.    Respiratory:  Negative.    Cardiovascular:  Negative.              Health Status   Allergies:    Allergic Reactions (Selected)  No Known Medication Allergies   Medications:  (Selected)   Prescriptions  Prescribed  CPAP +7 cm H2o: CPAP +7 cm H2o, See Instructions, Instructions: Heated humidifier, heated tubing, filters, nasal or full face mask of choice with headgear.  Replacement cushions and supplies as needed.  Months = 99 (Lifetime), Supply, # 1 EA, 0 Refill(s), Type: Maint...  Flonase 50 mcg/inh nasal spray: = 2 spray(s), Nasal, daily, # 1 EA, 11  Refill(s), Type: Maintenance, Pharmacy: [facility], 2 spray(s) Nasal daily, 67.5, in, 10/30/20 15:59:00 CDT, Height Measured, 193, lb, 10/30/20 15:59:00...  Metoprolol Succinate  mg oral tablet, extended release: = 1 tab(s), Oral, daily, # 90 tab(s), 3 Refill(s), Pharmacy: [facility], TAKE ONE Tablet BY MOUTH EVERY DAY, 67, in, 06/29/21 11:34:00 CDT, Height Measured, 196, lb, 06/29/21 11:34:00 CDT, Weight Measured  Voltaren Arthritis Pain 1% topical gel: ( 2 gm ), Topical, qid, # 240 gm, 5 Refill(s), Type: Maintenance, Pharmacy: [facility], 2 gm Topical qid, 67.5, in, 09/28/20 8:49:00 CDT, Height Measured, 195, lb, 09/28/20 8:49:00 CDT, Weigh...  atorvastatin 40 mg oral tablet: = 1 tab(s), Oral, daily, # 90 tab(s), 3 Refill(s), Type: Maintenance, Pharmacy: [facility], TAKE ONE Tablet BY MOUTH EVERY DAY, 67, in, 06/29/21 11:34:00 CDT, Height Measured, 196, lb, 06/29/21 11:34:00 CDT, Weight Measured  azithromycin 250 mg oral tablet: = 1 packet(s), PO, Once, Instructions: as directed on package labeling, # 6 tab(s), 0 Refill(s), Type: Soft Stop, Pharmacy: [facility], 1 packet(s) Oral once,Instr:as directed on package labe...  fluticasone 50 mcg/inh nasal spray: See Instructions, Instructions: 2 spray(s)   in each nostril daily, # 15.8 mL, 5 Refill(s), Type: Maintenance, Pharmacy: [facility], 2 spray(s) ; in each nostril daily, 67.5, in, 10/30/20 15:...  lisinopril 20 mg oral tablet: = 2 tab(s), Oral, daily, # 60 tab(s), 0 Refill(s), Type: Maintenance, Pharmacy: [facility], Overdue for labs/annual exam., 2 tab(s) Oral daily, 67, in, 06/29/21 11:34:00 CDT, Height Measured,...  meclizine 25  mg oral tablet: = 1 tab(s) ( 25 mg ), po, tid, PRN: as needed for dizziness, # 270 tab(s), 3 Refill(s), Type: Maintenance, Pharmacy: [facility], 1 tab(s) Oral tid,PRN:as needed for dizziness, 67.5, in, 08/28...  predniSONE 20 mg oral tablet: = 2 tab(s) ( 40 mg ), PO, Daily, # 10 tab(s), 0 Refill(s), Type: Maintenance, Pharmacy: [facility], 2 tab(s) Oral daily,x5 day(s), 67, in, 06/29/21 11:34:00 CDT, Height Measured, 196, lb, 06/...   Problem list:    All Problems  Allergic rhinitis / SNOMED CT 826111896 / Confirmed  CORBY (Obstructive Sleep Apnea) / SNOMED CT 4245108362 / Confirmed  Nonischemic cardiomyopathy / SNOMED CT 448084652 / Confirmed  Colon adenomas / SNOMED CT 7323482260 / Confirmed  Obesity / SNOMED CT 7617579163 / Probable  Benign Essential Hypertension / SNOMED CT 9567778 / Confirmed  Chronic low back pain / SNOMED CT 311981094 / Confirmed  Inactive: Obese / ICD-9-.00  Resolved: Inpatient stay / SNOMED CT 766496766  Resolved: Inpatient stay / SNOMED CT 644691236      Histories   Past Medical History:    Active  CORBY (Obstructive Sleep Apnea) (SNOMED CT 4017392765): Onset on 4/30/2014 at 69 years.  Comments:  4/20/2018 CDT 11:46 AM Jose Rivero MD  On 4/30/14 severe CORBY with AHI 81 ans oximetry naidir 64%. Optimal PAP presure not found.    5/29/2018 CDT 8:59 AM Jose Rivero MD  On 5/16/18 CPAP titration to 7.  Colon adenomas (SNOMED CT 6542129941)  Benign Essential Hypertension (SNOMED CT 3226441)  Chronic low back pain (SNOMED CT 246710207)  Nonischemic cardiomyopathy (SNOMED CT 665309785)  Resolved  Inpatient stay (SNOMED CT 793528540): Onset on 12/31/2018 at 73 years.  Resolved on 1/2/2019 at 73 years.  Comments:  1/3/2019 CST 10:04 AM CST - Agnes Barth  @[facility], [place] - Left knee hemiarthroplasty  Inpatient stay (SNOMED CT 676224040): Onset on  2018 at 73 years.  Resolved on 2018 at 73 years.  Comments:  2018 CDT 8:24 AM CDT - Ignacia Potter  [facility] - Chest pain.   Family History:    Cancer  Brother ()     Procedure history:    Arthroplasty (SNOMED CT 3903531717) performed by Leon Florentino MD on 2018 at 73 Years.  Comments:  1/3/2019 10:07 AM CST - Tab Agnes  left knee hemiarthroplasty  Facetectomy of vertebra (SNOMED CT 0850492) performed by Efrain Ramos MD on 7/10/2017 at 72 Years.  Comments:  2017 2:23 PM CDT - Ignacia Potter  Right L3-4.  Phacoemulsification (SNOMED CT 5283763222) performed by Edmond Munguia MD on 2015 at 69 Years.  Comments:  2015 7:56 AM CST - Ignacia Potter  Left.  Phacoemulsification (SNOMED CT 2113437855) performed by Edmond Munguia MD on 2015 at 69 Years.  Comments:  2015 9:51 AM Ignacia Parsons  Right.  Colonoscopy (SNOMED CT 638284288) performed by Jose Ham MD on 10/23/2013 at 68 Years.  Comments:  10/23/2013 10:42 AM Jose Rivero MD  Pedunculated polyp 25 cm, snared, not retrieved. Severe left-sided diverticulosis. Repeat 5 years.  Colonoscopy (SNOMED CT 971941476) performed by Jose Ham MD on 10/22/2008 at 63 Years.  Comments:  10/23/2013 10:41 AM Jose Rivero MD  1 adenoma., left-sided diverticulosis, rectal AVM. Repeat 5 years.  Colonoscopy (SNOMED CT 679474747) in  at 57 Years.  Lower Back Surgery.   Social History:        Electronic Cigarette/Vaping Assessment            Electronic Cigarette Use: Never.      Alcohol Assessment: Past            Quit 1969, Beer (12 oz)      Tobacco Assessment: Past            Past, Pipe, Started age 12 Years.  Stopped age 43 Years.            Never (less than 100 in lifetime)      Substance Abuse Assessment: Denies Substance Abuse            Never      Employment and Education Assessment            Retired, Work/School description: [employer].      Home and Environment  Assessment            Marital status: .  Spouse/Partner name: Josey.  Lives with Spouse.  2 children.  Living situation:               Home/Independent.  Smoker in household: No.  Injuries/Abuse/Neglect in household: No.  Feels unsafe at home:               No.  Family/Friends available for support: Yes.      Nutrition and Health Assessment            Type of diet: Regular.      Exercise and Physical Activity Assessment            Exercise frequency: Never.      Sexual Assessment            Identifies as male            Sexually active: No.  Sexual orientation: Heterosexual.        Physical Examination   Vital Signs   12/21/2021 3:13 PM CST Temperature Tympanic 98.2 DegF    Peripheral Pulse Rate 76 bpm    HR Method Electronic    Systolic Blood Pressure 156 mmHg  HI    Diastolic Blood Pressure 67 mmHg    Mean Arterial Pressure 97 mmHg    BP Site Right arm    BP Method Electronic      Documented vital signs:       Blood Pressure: Systolic  135  mmHg, Diastolic  70  mmHg.    General:  Alert and oriented, No acute distress.    Neck:  Supple.    Respiratory:  Lungs are clear to auscultation.    Cardiovascular:  Normal rate, Regular rhythm.       Impression and Plan   Diagnosis     Pure hypercholesterolemia (WQD02-IA E78.00).     Course:  Progressing as expected, Well Controlled.    Orders     Orders (Selected)   Prescriptions  Prescribed  atorvastatin 40 mg oral tablet: = 1 tab(s), Oral, daily, # 90 tab(s), 3 Refill(s), Type: Maintenance, Pharmacy: [facility], TAKE ONE Tablet BY MOUTH EVERY DAY, 67, in, 06/29/21 11:34:00 CDT, Height Measured, 196, lb, 06/29/21 11:34:00 CDT, Weight Measured.     Diagnosis     Hypertension (YGC30-QJ I10).     Course:  Progressing as expected, Well controlled.    Orders     Orders (Selected)   Prescriptions  Prescribed  Metoprolol Succinate  mg oral tablet, extended release: = 1 tab(s), Oral, daily, # 90 tab(s), 3 Refill(s), Pharmacy: [facility], TAKE ONE Tablet BY MOUTH EVERY DAY, 67, in, 06/29/21 11:34:00 CDT, Height Measured, 196, lb, 06/29/21 11:34:00 CDT, Weight Measured  lisinopril 20 mg oral tablet: = 2 tab(s), Oral, daily, # 60 tab(s), 0 Refill(s), Type: Maintenance, Pharmacy: [facility], Overdue for labs/annual exam., 2 tab(s) Oral daily, 67, in, 06/29/21 11:34:00 CDT, Height Measured,....     Orders     Orders   Lab (Gen Lab  Reference Lab):  PSA, Total (Room)* ([facility]) (Order): Specimen Type: Serum, Collection Date: 12/21/2021 3:33 PM CST  Lipid panel with reflex to direct ldl* ([facility]) (Order): Specimen Type: Serum, Collection Date: 12/21/2021 3:33 PM CST  CBC (h/h, RBC, indices, WBC, Plt)* ([facility]) (Order): Specimen Type: Blood, Collection Date: 12/21/2021 3:33 PM CST  Basic Metabolic Panel* ([facility]) (Order): Specimen Type: Serum, Collection Date: 12/21/2021 3:33 PM CST.     Orders   Requests (Return to Office):  Return to Clinic (Request) (Order): Return in 1 year.

## 2022-02-16 NOTE — TELEPHONE ENCOUNTER
Entered by Kailey Quijano CMA on November 30, 2021 12:01:48 PM CST  Date of last office visit and reason:  6/29/21 Back pain w/ CHT      Date of last Med Check / Px:   8/28/2020 AWV w/ K[facility]  Date of last labs pertaining to med:  BMP 8/28/2020     RTC order in chart:  Overdue for annual. One month protocol given    For Protocol refill, has patient been contacted:  Letters/Message        ------------------------------------------  From: [facility]  To: Tae COOPER [place]  Sent: November 30, 2021 10:08:21 AM CST  Subject: Medication Management  Due: November 16, 2021 3:37:48 PM CST     ** On Hold Pending Signature **     Drug: lisinopril (lisinopril 20 mg oral tablet), 2 tab(s) Oral daily,x90 day(s)  Quantity: 180 tab(s)  Days Supply: 0  Refills: 2  Substitutions Allowed  Notes from Pharmacy:     Dispensed Drug: lisinopril (lisinopril 20 mg oral tablet), TAKE TWO Tablet BY MOUTH EVERY DAY  Quantity: 180 tab(s)  Days Supply: 90  Refills: 3  Substitutions Allowed  Notes from Pharmacy:  ---------------------------------------------------------------  From: Kailey Quijano CMA   To: [employer]ac[employer] [facility]    Sent: 11/30/2021 12:02:28 PM CST  Subject: Medication Management     ** Submitted: **  Order:lisinopril (lisinopril 20 mg oral tablet)  2 tab(s)  Oral  daily  Qty:  60 tab(s)        Refills:  0          Substitutions Allowed     Route To Pharmacy - [facility]    Signed by Kailey Quijano CMA  11/30/2021 6:01:00 PM UTC    ** Submitted: **  Complete:lisinopril (lisinopril 20 mg oral tablet)   Signed by Kailey Quijano CMA  11/30/2021 6:02:00 PM [facility]    ** Not Approved:  **  lisinopril (lisinopril 20 mg tablet)  TAKE TWO Tablet BY MOUTH EVERY DAY  Qty:  180 tab(s)        Days Supply:  90        Refills:  3          Substitutions Allowed     Route To Pharmacy - [facility]  [facility] [place]   Signed by Kailey Quijano CMA

## 2022-02-16 NOTE — NURSING NOTE
Phone Message    PCP:   CHT      Time of Call:  4:26 pm       Person Calling:  pt  Phone number:  [phone]    Returned call at: 4:35 pm    Note:   Pt calls looking for lisinopril refill. Filled for 30 days - pt due for med check, fasting labs. Transferred to schedule appt.     Pharmacy: [facility]    Last office visit and reason:  5/13/16; HTN

## 2022-02-16 NOTE — TELEPHONE ENCOUNTER
---------------------  From: Zonia Wilde LPN (Phone Messages Pool ([id number]_[facility]))   To: Unit [address] Pool (32224_[facility]) ;     Sent: 5/8/2020 12:28:22 PM CDT  Subject: BP check     FYI    Phone Message    PCP:   CHT      Time of Call:  11:50am       Person Calling:  pt wife Dana  Phone number:  [phone]    Returned call at: 12:24pm    Note:   Dana LMN stating pt seen CHT about 1 week ago for high blood pressure. Dana says this morning pt said he had black spots in his eyes again. Dana says she tried to take his BP and thinks it was 159/116 but says it scrolls kind of fast for her.    Dana wondering if she should bring pt in clinic to have a nurse check his BP or if they should wait it out.    Returned call and pt has a little lightheadedness. Advised coming in to at least let us check his BP to verify what it is. She is ok with this and transferred to scheduling for BP check.    Last office visit and reason:  5/1/20 HypertensionPt came in for BP check. It was elevated but not much over what it has been. Patient has not been having any new or worsening symptoms. Advised to RTC next week for us to recheck as he just increased medications. Pt states his C pap may not be working as well as it should be. He is still snoring sometimes. Advised he follow up with [facility] for this.

## 2022-02-16 NOTE — NURSING NOTE
Comprehensive Intake Entered On:  5/21/2019 11:49 AM CDT    Performed On:  5/21/2019 11:42 AM CDT by Nathalie Muir CMA               Summary   Chief Complaint :   Patient is here today with lower back pain. Hx of back pain. Concerned about kidney stones.    Menstrual Status :   N/A   Weight Measured :   208.8 lb(Converted to: 208 lb 13 oz, 94.71 kg)    Systolic Blood Pressure :   124 mmHg   Diastolic Blood Pressure :   62 mmHg   Mean Arterial Pressure :   83 mmHg   Peripheral Pulse Rate :   61 bpm   BP Site :   Right arm   BP Method :   Manual   HR Method :   Electronic   Temperature Tympanic :   97.6 DegF(Converted to: 36.4 DegC)  (LOW)    Oxygen Saturation :   98 %   Nathalie Muir CMA - 5/21/2019 11:42 AM CDT   Health Status   Allergies Verified? :   Yes   Medication History Verified? :   Yes   Immunizations Current :   Yes   Pre-Visit Planning Status :   N/A   Tobacco Use? :   Never smoker   Nathalie Muir CMA - 5/21/2019 11:42 AM CDT   Consents   Consent for Immunization Exchange :   Consent Granted   Consent for Immunizations to Providers :   Consent Granted   Nathalie Muir CMA - 5/21/2019 11:42 AM CDT   Meds / Allergies   (As Of: 5/21/2019 11:49:34 AM CDT)   Allergies (Active)   No Known Medication Allergies  Estimated Onset Date:   Unspecified ; Created By:   Daina Alvarez CMA; Reaction Status:   Active ; Category:   Drug ; Substance:   No Known Medication Allergies ; Type:   Allergy ; Updated By:   Daina Alvarez CMA; Reviewed Date:   1/25/2019 8:56 AM CST        Medication List   (As Of: 5/21/2019 11:49:34 AM CDT)   Prescription/Discharge Order    atorvastatin  :   atorvastatin ; Status:   Completed ; Ordered As Mnemonic:   atorvastatin 40 mg oral tablet ; Simple Display Line:   40 mg, 1 tab(s), po, daily, [facility], 90 tab(s), 3 Refill(s) ; Ordering Provider:   Mike Strong MD; Catalog Code:   atorvastatin ; Order Dt/Tm:   7/27/2018 8:27:52 AM          lisinopril  :   lisinopril ; Status:    Prescribed ; Ordered As Mnemonic:   lisinopril 10 mg oral tablet ; Simple Display Line:   10 mg, 1 tab(s), PO, Daily, 90 tab(s), 3 Refill(s) ; Ordering Provider:   Mike Strong MD; Catalog Code:   lisinopril ; Order Dt/Tm:   7/27/2018 8:27:50 AM          meclizine  :   meclizine ; Status:   Prescribed ; Ordered As Mnemonic:   meclizine 25 mg oral tablet ; Simple Display Line:   25 mg, 1 tab(s), po, tid, PRN: as needed for dizziness, 30 tab(s), 2 Refill(s) ; Ordering Provider:   Mike Strong MD; Catalog Code:   meclizine ; Order Dt/Tm:   7/27/2018 8:27:51 AM          metoprolol  :   metoprolol ; Status:   Prescribed ; Ordered As Mnemonic:   metoprolol succinate 100 mg oral tablet, extended release ; Simple Display Line:   100 mg, 1 tab(s), po, daily, [facility] 4/12/2018, 90 tab(s), 3 Refill(s) ; Ordering Provider:   Mike Strong MD; Catalog Code:   metoprolol ; Order Dt/Tm:   7/27/2018 8:27:53 AM          Miscellaneous Rx Supply  :   Miscellaneous Rx Supply ; Status:   Prescribed ; Ordered As Mnemonic:   CPAP +7 cm H2o ; Simple Display Line:   See Instructions, Heated humidifier, heated tubing, filters, nasal or full face mask of choice with headgear.  Replacement cushions and supplies as needed.  Months = 99 (Lifetime), 1 EA, 0 Refill(s) ; Ordering Provider:   Jose Ham MD; Catalog Code:   Miscellaneous Rx Supply ; Order Dt/Tm:   5/29/2018 9:03:09 AM            Home Meds    celecoxib  :   celecoxib ; Status:   Completed ; Ordered As Mnemonic:   CeleBREX ; Simple Display Line:   Oral, 0 Refill(s) ; Catalog Code:   celecoxib ; Order Dt/Tm:   1/3/2019 10:42:10 AM          methylphenidate  :   methylphenidate ; Status:   Documented ; Ordered As Mnemonic:   Concerta ; Simple Display Line:   Oral, qam, 0 Refill(s) ; Catalog Code:   methylphenidate ; Order Dt/Tm:   1/3/2019 10:43:23 AM          oxyCODONE  :   oxyCODONE ; Status:   Completed ; Ordered As Mnemonic:   oxyCODONE ; Simple  Display Line:   Oral, 0 Refill(s) ; Catalog Code:   oxyCODONE ; Order Dt/Tm:   1/3/2019 10:43:16 AM          rivaroxaban  :   rivaroxaban ; Status:   Completed ; Ordered As Mnemonic:   Xarelto ; Simple Display Line:   Oral, 0 Refill(s) ; Catalog Code:   rivaroxaban ; Order Dt/Tm:   1/3/2019 10:42:29 AM

## 2022-02-16 NOTE — TELEPHONE ENCOUNTER
---------------------  From: Tamika Marrero   To: [facility] Message Pool ([id number]_[facility]);     Sent: 12/7/2021 9:45:23 AM CST  Subject: General Message     Patient is needing a refill on Lisinopril 20 mg tablets. He has 2 tablets left. He is scheduled to [facility] on 12.16.21 for a Rx refill. Is just needing to get by until that appointment. Patient can be reached at [phone]. Patient uses [facility] in [facility] informing patient a one month supply was sent on 11/30/2021Wife and pt calling back saying they realized their mistake and they don't need anything else right now.

## 2022-02-16 NOTE — NURSING NOTE
Comprehensive Intake Entered On:  1/25/2019 8:59 AM CST    Performed On:  1/25/2019 8:55 AM CST by Liza Stone MA               Summary   Chief Complaint :   Follow up heart issues    Menstrual Status :   N/A   Weight Measured :   201.6 lb(Converted to: 201 lb 10 oz, 91.44 kg)    Height Measured :   67.5 in(Converted to: 5 ft 7 in, 171.45 cm)    Body Mass Index :   31.11 kg/m2 (HI)    Body Surface Area :   2.08 m2   Systolic Blood Pressure :   136 mmHg (HI)    Diastolic Blood Pressure :   78 mmHg   Mean Arterial Pressure :   97 mmHg   Peripheral Pulse Rate :   60 bpm   BP Site :   Right arm   Pulse Site :   Radial artery   BP Method :   Electronic   HR Method :   Manual   Liza Stone MA - 1/25/2019 8:55 AM CST   Health Status   Allergies Verified? :   Yes   Medication History Verified? :   Yes   Immunizations Current :   Yes   Medical History Verified? :   Yes   Pre-Visit Planning Status :   Completed   Tobacco Use? :   Former smoker   Liza Stone MA - 1/25/2019 8:55 AM CST   Consents   Consent for Immunization Exchange :   Consent Granted   Consent for Immunizations to Providers :   Consent Granted   Liza Stone MA - 1/25/2019 8:55 AM CST   Meds / Allergies   (As Of: 1/25/2019 8:59:16 AM CST)   Allergies (Active)   No Known Medication Allergies  Estimated Onset Date:   Unspecified ; Created By:   Daina Alvarez CMA; Reaction Status:   Active ; Category:   Drug ; Substance:   No Known Medication Allergies ; Type:   Allergy ; Updated By:   Daina Alvarez CMA; Reviewed Date:   1/25/2019 8:56 AM CST        Medication List   (As Of: 1/25/2019 8:59:16 AM CST)   Prescription/Discharge Order    metoprolol  :   metoprolol ; Status:   Prescribed ; Ordered As Mnemonic:   metoprolol succinate 100 mg oral tablet, extended release ; Simple Display Line:   100 mg, 1 tab(s), po, daily, [facility], 90 tab(s), 3 Refill(s) ; [facility]ing [facility]   [facility]; Catalog Code:   metoprolol ; Order  Dt/Tm:   7/27/2018 8:27:53 AM          atorvastatin  :   atorvastatin ; Status:   Prescribed ; Ordered As Mnemonic:   atorvastatin 40 mg oral tablet ; Simple Display Line:   40 mg, 1 tab(s), po, daily, [facility] 4/12/2018, 90 tab(s), 3 Refill(s) ; Ordering Provider:   Mike Strong MD; Catalog Code:   atorvastatin ; Order Dt/Tm:   7/27/2018 8:27:52 AM          meclizine  :   meclizine ; Status:   Prescribed ; Ordered As Mnemonic:   meclizine 25 mg oral tablet ; Simple Display Line:   25 mg, 1 tab(s), po, tid, PRN: as needed for dizziness, 30 tab(s), 2 Refill(s) ; Ordering Provider:   Mike Strong MD; Catalog Code:   meclizine ; Order Dt/Tm:   7/27/2018 8:27:51 AM          lisinopril  :   lisinopril ; Status:   Prescribed ; Ordered As Mnemonic:   lisinopril 10 mg oral tablet ; Simple Display Line:   10 mg, 1 tab(s), PO, Daily, 90 tab(s), 3 Refill(s) ; Ordering Provider:   Mike Strong MD; Catalog Code:   lisinopril ; Order Dt/Tm:   7/27/2018 8:27:50 AM          Miscellaneous Rx Supply  :   Miscellaneous Rx Supply ; Status:   Prescribed ; Ordered As Mnemonic:   CPAP +7 cm H2o ; Simple Display Line:   See Instructions, Heated humidifier, heated tubing, filters, nasal or full face mask of choice with headgear.  Replacement cushions and supplies as needed.  Months = 99 (Lifetime), 1 EA, 0 Refill(s) ; Ordering Provider:   Jose Ham MD; Catalog Code:   Miscellaneous Rx Supply ; Order Dt/Tm:   5/29/2018 9:03:09 AM            Home Meds    celecoxib  :   celecoxib ; Status:   Documented ; Ordered As Mnemonic:   CeleBREX ; Simple Display Line:   Oral, 0 Refill(s) ; Catalog Code:   celecoxib ; Order Dt/Tm:   1/3/2019 10:42:10 AM          methylphenidate  :   methylphenidate ; Status:   Documented ; Ordered As Mnemonic:   Concerta ; Simple Display Line:   Oral, qam, 0 Refill(s) ; Catalog Code:   methylphenidate ; Order Dt/Tm:   1/3/2019 10:43:23 AM          oxyCODONE  :   oxyCODONE ; Status:    Documented ; Ordered As Mnemonic:   oxyCODONE ; Simple Display Line:   Oral, 0 Refill(s) ; Catalog Code:   oxyCODONE ; Order Dt/Tm:   1/3/2019 10:43:16 AM          rivaroxaban  :   rivaroxaban ; Status:   Documented ; Ordered As Mnemonic:   Xarelto ; Simple Display Line:   Oral, 0 Refill(s) ; Catalog Code:   rivaroxaban ; Order Dt/Tm:   1/3/2019 10:42:29 AM

## 2022-02-16 NOTE — PROGRESS NOTES
Patient:   ZHANE HOLBROOK            MRN: [id number]            FIN: [id number]               Age:   73 years     Sex:  Male     :  1945   Associated Diagnoses:   Left knee DJD; CORBY (Obstructive Sleep Apnea); Nonischemic cardiomyopathy; Benign Essential Hypertension; Chronic low back pain   Author:   Mike Strong MD      Preoperative Information   Indication for surgery:  Musculoskeletal disorder, Left knee DJD.    Accompanied by:  No one.    Source of history:  Self.           Chief Complaint   2018 9:57 AM CST   Pre-op, DOS 18, RFAH, Left Knee,     Chief complaint and symptoms noted above confirmed with patient.      Review of Systems   Constitutional:  Negative.    Ear/Nose/Mouth/Throat:  Negative.    Respiratory:  Negative.    Cardiovascular:  Negative.    Gastrointestinal:  Negative.    Genitourinary:  Negative.    Hematology/Lymphatics:  Negative.    Musculoskeletal:  Joint pain.    Integumentary:  Negative.    Neurologic:  Negative.    Psychiatric:  Negative.       Health Status   Allergies:    Allergic Reactions (Selected)  No Known Medication Allergies   Medications:  (Selected)   Prescriptions  Prescribed  CPAP +7 cm H2o: CPAP +7 cm H2o, See Instructions, Instructions: Heated humidifier, heated tubing, filters, nasal or full face mask of choice with headgear.  Replacement cushions and supplies as needed.  Months = 99 (Lifetime), Supply, # 1 EA, 0 Refill(s), Type: Maint...  atorvastatin 40 mg oral tablet: 1 tab(s) ( 40 mg ), po, daily, Instructions: [facility], # 90 tab(s), 3 Refill(s), Type: Maintenance, Pharmacy: [id number], 1 tab(s) Oral daily,Instr:Per [facility] 2018  lisinopril 10 mg oral tablet: 1 tab(s) ( 10 mg ), PO, Daily, # 90 tab(s), 3 Refill(s), Type: Maintenance, Pharmacy: [id number], 1 tab(s) Oral daily  meclizine 25 mg oral tablet: 1 tab(s) ( 25 mg ), po, tid, PRN: as needed for dizziness, # 30 tab(s), 2 Refill(s), Type: Maintenance,  Pharmacy: [id number], 1 tab(s) Oral tid,PRN:as needed for dizziness  metoprolol succinate 100 mg oral tablet, extended release: 1 tab(s) ( 100 mg ), po, daily, Instructions: [facility], # 90 tab(s), 3 Refill(s), Type: Maintenance, Pharmacy: [id number], 1 tab(s) Oral daily,Instr:Per [facility] 2018   Problem list:    All Problems  Benign Essential Hypertension / SNOMED CT 1789039 / Confirmed  Nonischemic cardiomyopathy / SNOMED CT 996085536 / Confirmed  Chronic low back pain / SNOMED CT 371681388 / Confirmed  Colon Adenomas / ICD-9-.3 / Confirmed  Obese / ICD-9-.00 / Probable  CORBY (Obstructive Sleep Apnea) / SNOMED CT 4791107288 / Confirmed  Resolved: Inpatient stay / SNOMED CT 614082833      Histories   Past Medical History:    Active  CORBY (Obstructive Sleep Apnea) (SNOMED CT 4350257410): Onset on 2014 at 69 years.  Comments:  2018 CDT 11:46 AM Jose Rivero MD  On 14 severe CORBY with AHI 81 ans oximetry naidir 64%. Optimal PAP presure not found.    2018 CDT 8:59 AM Jose Rivero MD  On 18 CPAP titration to 7.  Colon Adenomas (ICD-9-.3)  Benign Essential Hypertension (SNOMED CT 8796405)  Chronic low back pain (SNOMED CT 421752531)  Resolved  Inpatient stay (SNOMED CT 831362939): Onset on 2018 at 73 years.  Resolved on 2018 at 73 years.  Comments:  2018 CDT 8:24 AM CDT - Ignacia Potter  [facility] - Chest pain.   Family History:    Cancer  Brother ()     Procedure history:    Facetectomy of vertebra (SNOMED CT 1858424) performed by Efrain Ramos MD on 7/10/2017 at 72 Years.  Comments:  2017 2:23 PM CDT - Ignacia Potter  Right L3-4.  Phacoemulsification (SNOMED CT 3338588413) performed by Edmond Munguia MD on 2015 at 69 Years.  Comments:  2015 7:56 AM CST - Ignacia Potter  Left.  Phacoemulsification (SNOMED CT 3024175163) performed by Edmond Munguia MD on 2015 at 69  Years.  Comments:  2/5/2015 9:51 AM CST - Ignacia Potter  Right.  Colonoscopy (SNOMED CT 739782296) performed by Jose Ham MD on 10/23/2013 at 68 Years.  Comments:  10/23/2013 10:42 AM Jose Rivero MD  Pedunculated polyp 25 cm, snared, not retrieved. Severe left-sided diverticulosis. Repeat 5 years.  Colonoscopy (SNOMED CT 768840797) performed by Jose Ham MD on 10/22/2008 at 63 Years.  Comments:  10/23/2013 10:41 AM Jose Rivero MD  1 adenoma., left-sided diverticulosis, rectal AVM. Repeat 5 years.  Colonoscopy (SNOMED CT 520597453) in 2002 at 57 Years.  Lower Back Surgery.   Social History:        Alcohol Assessment: Past            Past, Beer (12 oz)      Tobacco Assessment: Past            Past, Pipe, Started age 12 Years.  Stopped age 43 Years.      Substance Abuse Assessment: Denies Substance Abuse            Never      Employment and Education Assessment            Retired, Work/School description: [employer].      Home and Environment Assessment            Marital status: .  Spouse/Partner name: Josey.  Lives with Spouse.  2 children.  Living situation:               Home/Independent.  Smoker in household: No.      Nutrition and Health Assessment            Type of diet: Regular.      Exercise and Physical Activity Assessment            Exercise frequency: Never.      Sexual Assessment            Sexually active: No.  Sexual orientation: Heterosexual.     Has no history of anemia.  Has no history of DVT or pulmonary embolism.  Has no personal history of bleeding problems.   Has no personal or family history of anesthesia reactions.  Patient does not have active tuberculosis.    S/he has not taken aspirin or aspirin containing products in the last week.     S/he has not taken Plavix (Clopidogrel) in the last 2 weeks.    S/he has not taken warfarin in the past week.    S/he has not been on corticosteroids for more than 2 weeks recently.      S/he is not DNR before, during  or after surgery.    Chest pain / SOB walking up 2 flights of steps? NO  Pain in neck or jaw? NO  CAD MI?  NO  A fib? NO  Heart Failure? HX of Cardiomyopathy  Asthma  or Bronchitis? NO   Diabetes? NO       Insulin/Orals?   Seizure Disorder? NO  Chronic Kidney Disease? NO  Thyroid Disease? NO  Liver Disease NO  CVA? NO  ON CPAP         Physical Examination   Vital Signs   12/14/2018 9:57 AM CST Peripheral Pulse Rate 60 bpm    Pulse Site Radial artery    HR Method Manual    Systolic Blood Pressure 140 mmHg  HI    Diastolic Blood Pressure 78 mmHg    Mean Arterial Pressure 99 mmHg    BP Site Left arm    BP Method Manual      Documented vital signs   General:  Alert and oriented, No acute distress.    Eye:  Normal conjunctiva.    HENT:  Normocephalic.    Neck:  Supple.    Respiratory:  Lungs are clear to auscultation, Respirations are non-labored.    Cardiovascular:  Normal rate, Regular rhythm.    Gastrointestinal:  Soft, Non-tender.    Genitourinary:  No costovertebral angle tenderness.    Musculoskeletal:  Normal range of motion, Normal strength.         Lower extremity exam: Knee ( Left, Tenderness, Range of motion ( Restricted by pain ) ).    Integumentary:  Warm, Dry, Pink.    Neurologic:  Alert, Oriented, Normal sensory.    Psychiatric:  Cooperative, Appropriate mood & affect, Normal judgment, Non-suicidal.       Review / Management         Results review:  Lab results   7/27/2018 8:58 AM CDT Sodium Level 140 mmol/L    Potassium Level 4.9 mmol/L    Glucose Level 102 mg/dL  HI    eGFR 64 mL/min/1.73m2    WBC 7.5    Hgb 14.4 gm/dL   .    ECG interpretation:  Time  12/14/2018 10:31:00 AM, Rate  50  beats per minute, Sinus Bradycardia, RSR (V1), Normal EKG.       Impression and Plan   Diagnosis     Left knee DJD (RPZ48-LU M17.12).     Diagnosis     CORBY (Obstructive Sleep Apnea) (SFD33-ZO G47.33).     Nonischemic cardiomyopathy (UAR38-WS I42.8).     Benign Essential Hypertension (GJG25-VR I10).     Chronic low back  pain (ZRV10-ZE M54.5).     Patient Instructions:  May proceed with anticipated surgery, risk vs benefits discussed.  OK for spinal anesthesia..

## 2022-02-16 NOTE — PROGRESS NOTES
Patient:   ZHANE HOLBROOK            MRN: [id number]            FIN: [id number]               Age:   72 years     Sex:  Male     :  1945   Associated Diagnoses:   Impacted cerumen   Author:   Tae COOPER, Mike      Procedure   Ear foreign body removal procedure   Date/ Time:  2017 11:06:00 AM.     Confirmed: patient.     Performed by: self.     Informed consent: Verbally obtained.     Indication: hearing disturbance.     Location: left ear, right ear.     Preparation and technique: positioned sitting upright, method including (cerumen loop, curette, irrigation with warm tap water, otologic syringe).     Results: foreign body removal complete.     Procedure tolerated: well.     No Complications.        Impression and Plan   Diagnosis     Impacted cerumen (QKI08-IU H61.23).     Orders     F/U  if not improving, sooner if getting worse.

## 2022-02-16 NOTE — CARE COORDINATION
Patient:   ZHANE HOLBROOK            MRN: [id number]            FIN: [id number]               Age:   73 years     Sex:  Male     :  1945   Associated Diagnoses:   None   Author:   Elisha Jackson      Sources of Information:  [ x] Patient, family member, or caregiver (Please list): Pt states that he is doing well, is up and walking, and then resting, is taking medication from the hospital and watching the salt intake.  [x ] Hospital discharge summary  [ ] Hospital fax  [ ] List of recent hospitalizations or ED visits  [ ] Other:   Discharged From:  [facility]  Discharge Date: 2018  Diagnosis/Problem: Elevated troponin presumed from myocarditis, recent viral infection, dec EF (30-35%), had ns vt  Discharge note reviewed: [x ] Yes [ ]No  Medication Changes: [ x] Yes [ ] No   Medication List Updated: [x ] Yes [ ] No  Needs Referral or Lab: [ x] Yes [ ] No  Needs BMP and Echocardiogram  F/U Appointment Made With: T  Date: 2018  Patient contacted: Yes, see above note  Additional Information :  Pt is to follow closely with cardiology , needs a BMP, needs Echocardiogram in 3-4 weeks

## 2022-02-16 NOTE — NURSING NOTE
Comprehensive Intake Entered On:  10/30/2020 4:04 PM CDT    Performed On:  10/30/2020 3:59 PM CDT by Keerthi Nguyễn               Summary   Chief Complaint :   CPAP compliance    Advance Directive :   No   Menstrual Status :   N/A   Weight Measured :   193 lb(Converted to: 193 lb 0 oz, 87.543 kg)    Height Measured :   67.5 in(Converted to: 5 ft 7 in, 171.45 cm)    Body Mass Index :   29.78 kg/m2 (HI)    Body Surface Area :   2.04 m2   Systolic Blood Pressure :   120 mmHg   Diastolic Blood Pressure :   72 mmHg   Mean Arterial Pressure :   88 mmHg   Peripheral Pulse Rate :   61 bpm   BP Site :   Right arm   BP Method :   Manual   HR Method :   Electronic   Temperature Tympanic :   99.1 DegF(Converted to: 37.3 DegC)    Oxygen Saturation :   95 %   Keerthi Nguyễn - 10/30/2020 3:59 PM CDT   Health Status   Allergies Verified? :   Yes   Medication History Verified? :   Yes   Immunizations Current :   Yes   Medical History Verified? :   Yes   Pre-Visit Planning Status :   Completed   Tobacco Use? :   Never smoker   Keerthi Nguyễn - 10/30/2020 3:59 PM CDT   Consents   Consent for Immunization Exchange :   Consent Granted   Consent for Immunizations to Providers :   Consent Granted   Keerthi Nguyễn - 10/30/2020 3:59 PM CDT   Meds / Allergies   (As Of: 10/30/2020 4:04:17 PM CDT)   Allergies (Active)   No Known Medication Allergies  Estimated Onset Date:   Unspecified ; Created By:   Daina Alvarez CMA; Reaction Status:   Active ; Category:   Drug ; Substance:   No Known Medication Allergies ; Type:   Allergy ; Updated By:   Daina Alvarez CMA; Reviewed Date:   10/30/2020 4:01 PM CDT        Medication List   (As Of: 10/30/2020 4:04:17 PM CDT)   Prescription/Discharge Order    atorvastatin  :   atorvastatin ; Status:   Prescribed ; Ordered As Mnemonic:   atorvastatin 40 mg oral tablet ; Simple Display Line:   40 mg, 1 tab(s), Oral, daily, 90 tab(s), 3 Refill(s) ; Ordering Provider:   Rizwan Noe PA-C; Catalog  Code:   atorvastatin ; Order Dt/Tm:   8/28/2020 9:08:41 AM CDT          diclofenac topical  :   diclofenac topical ; Status:   Prescribed ; Ordered As Mnemonic:   Voltaren Arthritis Pain 1% topical gel ; Simple Display Line:   2 gm, Topical, qid, 240 gm, 5 Refill(s) ; Ordering Provider:   Mike Strong MD; Catalog Code:   diclofenac topical ; Order Dt/Tm:   9/28/2020 9:43:49 AM CDT          lisinopril  :   lisinopril ; Status:   Prescribed ; Ordered As Mnemonic:   lisinopril 20 mg oral tablet ; Simple Display Line:   20 mg, 1 tab(s), Oral, daily, 90 tab(s), 3 Refill(s) ; Ordering Provider:   Rizwan Noe PA-C; Catalog Code:   lisinopril ; Order Dt/Tm:   8/28/2020 9:08:40 AM CDT          meclizine  :   meclizine ; Status:   Prescribed ; Ordered As Mnemonic:   meclizine 25 mg oral tablet ; Simple Display Line:   25 mg, 1 tab(s), po, tid, PRN: as needed for dizziness, 270 tab(s), 3 Refill(s) ; Ordering Provider:   Rizwan Noe PA-C; Catalog Code:   meclizine ; Order Dt/Tm:   8/28/2020 9:08:41 AM CDT          metoprolol  :   metoprolol ; Status:   Prescribed ; Ordered As Mnemonic:   Metoprolol Succinate  mg oral tablet, extended release ; Simple Display Line:   100 mg, 1 tab(s), Oral, daily, 90 tab(s), 3 Refill(s) ; Ordering Provider:   Rizwan Noe PA-C; Catalog Code:   metoprolol ; Order Dt/Tm:   8/28/2020 9:08:39 AM CDT          Miscellaneous Rx Supply  :   Miscellaneous Rx Supply ; Status:   Prescribed ; Ordered As Mnemonic:   CPAP +7 cm H2o ; Simple Display Line:   See Instructions, Heated humidifier, heated tubing, filters, nasal or full face mask of choice with headgear.  Replacement cushions and supplies as needed.  Months = 99 (Lifetime), 1 EA, 0 Refill(s) ; Ordering Provider:   Jose Ham MD; Catalog Code:   Miscellaneous Rx Supply ; Order Dt/Tm:   10/30/2020 4:00:50 PM CDT          Miscellaneous Rx Supply  :   Miscellaneous Rx Supply ; Status:   Discontinued ; Ordered As Mnemonic:    CPAP +7 cm H2o ; Simple Display Line:   See Instructions, Heated humidifier, heated tubing, filters, nasal or full face mask of choice with headgear.  Replacement cushions and supplies as needed.  Months = 99 (Lifetime), 1 EA, 0 Refill(s) ; Ordering Provider:   Jose Ham MD; Catalog Code:   Miscellaneous Rx Supply ; Order Dt/Tm:   5/29/2018 9:03:09 AM CDT            ID Risk Screen   Recent Travel History :   No recent travel   Family Member Travel History :   No recent travel   Other Exposure to Infectious Disease :   Unknown   Keerthi Nguyễn - 10/30/2020 3:59 PM CDT

## 2022-02-16 NOTE — NURSING NOTE
Medicare Visit Entered On:  8/28/2020 8:48 AM CDT    Performed On:  8/28/2020 8:39 AM CDT by Jennifer Miles CMA               Summary   Chief Complaint :   AWV, due for bloodwork; Discuss Arthritis   Menstrual Status :   N/A   Weight Measured :   194.0 lb(Converted to: 194 lb 0 oz, 88.00 kg)    Height Measured :   67.5 in(Converted to: 5 ft 7 in, 171.45 cm)    Body Mass Index :   29.93 kg/m2 (HI)    Body Surface Area :   2.05 m2   Systolic Blood Pressure :   130 mmHg   Diastolic Blood Pressure :   80 mmHg   Mean Arterial Pressure :   97 mmHg   Peripheral Pulse Rate :   56 bpm (LOW)    BP Site :   Right arm   BP Method :   Manual   HR Method :   Electronic   Oxygen Saturation :   98 %   Jennifer Miles CMA - 8/28/2020 8:39 AM CDT   Health Status   Allergies Verified? :   Yes   Medication History Verified? :   Yes   Immunizations Current :   Yes   Medical History Verified? :   Yes   Pre-Visit Planning Status :   Completed   Jennifer Miles CMA - 8/28/2020 8:39 AM CDT   Consents   Consent for Immunization Exchange :   Consent Granted   Consent for Immunizations to Providers :   Consent Granted   Jennifer Miles CMA - 8/28/2020 8:39 AM CDT   Past Medical History   Past Medical History   (As Of: 8/28/2020 8:48:34 AM CDT)   Colon adenomas  Name of Problem:   Colon adenomas ; Recorder:   Jose Ham MD; Confirmation:   Confirmed ; Classification:   Medical ; Code:   [id number] ; Contributor System:   [employer] ; Last Updated:   1/3/2019 10:03 AM CST ; Life Cycle Status:   Active ; Responsible Provider:   Jose Ham MD; Vocabulary:   SNOMED CT          Benign Essential Hypertension  Name of Problem:   Benign Essential Hypertension ; Recorder:   Rizwan Noe PA-C; Confirmation:   Confirmed ; Classification:   Medical ; Code:   [id number] ; Contributor System:   Power[employer] ; Last Updated:   7/28/2017 12:06 PM CDT ; Life Cycle Date:   5/27/2014 ; Life Cycle Status:   Active ; Responsible Provider:   Rizwan Noe PA-C;  Vocabulary:   SNOMED CT          CORBY (Obstructive Sleep Apnea)  Name of Problem:   CORBY (Obstructive Sleep Apnea) ; Onset Date:   4/30/2014 ; Recorder:   Rizwan Noe PA-C; Confirmation:   Confirmed ; Classification:   Medical ; Code:   [id number] ; Contributor System:   PowerChart ; Last Updated:   4/20/2018 11:46 AM CDT ; Life Cycle Status:   Active ; Responsible Provider:   Rizwan Noe PA-C; Vocabulary:   SNOMED CT ; Comments:      4/20/2018 11:46 AM - Jose Ham MD  On 4/30/14 severe CORBY with AHI 81 ans oximetry naidir 64%. Optimal PAP presure not found.    5/29/2018 8:59 AM - Jose Ham MD  On 5/16/18 CPAP titration to 7.          Chronic low back pain  Name of Problem:   Chronic low back pain ; Recorder:   Mike Strong MD; Confirmation:   Confirmed ; Classification:   Medical ; Code:   [id number] ; Contributor System:   PowerChart ; Last Updated:   7/28/2017 12:06 PM CDT ; Life Cycle Status:   Active ; Responsible Provider:   Mike Strong MD; Vocabulary:   SNOMED CT          Inpatient stay  Name of Problem:   Inpatient stay ; Onset Date:   4/9/2018 ; Recorder:   Ignacia Potter; Confirmation:   Confirmed ; Classification:   Medical ; Code:   [id number] ; Contributor System:   PowerChart ; Last Updated:   4/19/2018 8:24 AM CDT ; Life Cycle Date:   4/12/2018 ; Life Cycle Status:   Resolved ; Vocabulary:   SNOMED CT ; Resolved Date:    4/12/2018 ; Resolved Age:   73 years ; Comments:      4/19/2018 8:24 AM - Ignacia Potter  [facility] - Chest pain.          Inpatient stay  Name of Problem:   Inpatient stay ; Onset Date:   12/31/2018 ; Recorder:   Agnes Barth; Confirmation:   Confirmed ; Classification:   Medical ; Code:   [id number] ; Contributor System:   PowerChart ; Last Updated:   1/3/2019 10:04 AM CST ; Life Cycle Date:   1/2/2019 ; Life Cycle Status:   Resolved ; Vocabulary:   SNOMED CT ; Resolved Date:    1/2/2019 ; Resolved Age:   73 years ; Comments:      1/3/2019 10:04  [facility] - Agnes Barth  @[facility], [place] - Left knee hemiarthroplasty            Procedures / Surgeries        -    Procedure History   (As Of: 8/28/2020 8:48:34 AM CDT)     Anesthesia Minutes:   0 ; Procedure Name:   Lower Back Surgery ; Procedure Minutes:   0 ; Last Reviewed Dt/Tm:   8/28/2020 8:43:54 AM CDT            Procedure Dt/Tm:   2002 ; Anesthesia Minutes:   0 ; Procedure Name:   Colonoscopy ; Procedure Minutes:   0 ; Last Reviewed Dt/Tm:   8/28/2020 8:43:54 AM CDT            Procedure Dt/Tm:   10/23/2013 ; Provider:   Jose Ham MD; Anesthesia Minutes:   0 ; Procedure Name:   Colonoscopy ; Procedure Minutes:   0 ; Comments:     10/23/2013 10:42 AM CDT - Jose Ham MD  Pedunculated polyp 25 cm, snared, not retrieved. Severe left-sided diverticulosis. Repeat 5 years. ; Last Reviewed Dt/Tm:   8/28/2020 8:43:54 AM CDT            Procedure Dt/Tm:   10/22/2008 ; Provider:   Jose Ham MD; Anesthesia Minutes:   0 ; Procedure Name:   Colonoscopy ; Procedure Minutes:   0 ; Comments:     10/23/2013 10:41 AM CDT - Jose Ham MD  1 adenoma., left-sided diverticulosis, rectal AVM. Repeat 5 years. ; Last Reviewed Dt/Tm:   8/28/2020 8:43:54 AM CDT            Procedure Dt/Tm:   1/30/2015 ; Anesthesia Minutes:   0 ; Procedure Name:   Phacoemulsification ; Procedure Minutes:   0 ; Comments:     2/5/2015 9:51 AM Ignacia Parsons  Right. ; Last Reviewed Dt/Tm:   8/28/2020 8:43:54 AM CDT            Procedure Dt/Tm:   2/12/2015 ; Anesthesia Minutes:   0 ; Procedure Name:   Phacoemulsification ; Procedure Minutes:   0 ; Comments:     2/17/2015 7:56 AM CST - Ignacia Potter  Left. ; Last Reviewed Dt/Tm:   8/28/2020 8:43:54 AM CDT            Procedure Dt/Tm:   7/10/2017 ; Anesthesia Minutes:   0 ; Procedure Name:   Facetectomy of vertebra ; Procedure Minutes:   0 ; Comments:     8/30/2017 2:23 PM CDT - Tariq , Ignacia  Right L3-4. ; Last Reviewed Dt/Tm:   8/28/2020 8:43:54 AM CDT            Procedure  Dt/Tm:   12/31/2018 ; Anesthesia Minutes:   0 ; Procedure Name:   Arthroplasty ; Procedure Minutes:   0 ; Comments:     1/3/2019 10:07 AM GOPAL Richards Agnes Barth  left knee hemiarthroplasty ; Last Reviewed Dt/Tm:   8/28/2020 8:43:54 AM CDT            Meds / Allergies   (As Of: 8/28/2020 8:48:34 AM CDT)   Allergies (Active)   No Known Medication Allergies  Estimated Onset Date:   Unspecified ; Created By:   Daina Alvarez CMA; Reaction Status:   Active ; Category:   Drug ; Substance:   No Known Medication Allergies ; Type:   Allergy ; Updated By:   Daina Alvarez CMA; Reviewed Date:   8/28/2020 8:43 AM CDT        Medication List   (As Of: 8/28/2020 8:48:34 AM CDT)   Prescription/Discharge Order    atorvastatin  :   atorvastatin ; Status:   Prescribed ; Ordered As Mnemonic:   atorvastatin 40 mg oral tablet ; Simple Display Line:   40 mg, 1 tab(s), Oral, daily, 90 tab(s), 3 Refill(s) ; Ordering Provider:   Rizwan Noe PA-C; Catalog Code:   atorvastatin ; Order Dt/Tm:   7/25/2019 10:32:18 AM CDT          lisinopril  :   lisinopril ; Status:   Prescribed ; Ordered As Mnemonic:   lisinopril 20 mg oral tablet ; Simple Display Line:   20 mg, 1 tab(s), Oral, daily, 90 tab(s), 3 Refill(s) ; Ordering Provider:   Mike Strong MD; Catalog Code:   lisinopril ; Order Dt/Tm:   4/30/2020 11:00:45 AM CDT          meclizine  :   meclizine ; Status:   Prescribed ; Ordered As Mnemonic:   meclizine 25 mg oral tablet ; Simple Display Line:   25 mg, 1 tab(s), po, tid, PRN: as needed for dizziness, 90 tab(s), 0 Refill(s) ; Ordering Provider:   Mike Strong MD; Catalog Code:   meclizine ; Order Dt/Tm:   6/5/2020 11:28:57 AM CDT          metoprolol  :   metoprolol ; Status:   Prescribed ; Ordered As Mnemonic:   Metoprolol Succinate  mg oral tablet, extended release ; Simple Display Line:   100 mg, 1 tab(s), Oral, daily, for 90 day(s), 90 tab(s), 3 Refill(s) ; Ordering Provider:   Mike Strong MD; Catalog Code:    metoprolol ; Order Dt/Tm:   4/30/2020 10:58:42 AM CDT          Miscellaneous Rx Supply  :   Miscellaneous Rx Supply ; Status:   Prescribed ; Ordered As Mnemonic:   CPAP +7 cm H2o ; Simple Display Line:   See Instructions, Heated humidifier, heated tubing, filters, nasal or full face mask of choice with headgear.  Replacement cushions and supplies as needed.  Months = 99 (Lifetime), 1 EA, 0 Refill(s) ; Ordering Provider:   Jose Ham MD; Catalog Code:   Miscellaneous Rx Supply ; Order Dt/Tm:   5/29/2018 9:03:09 AM CDT            Geriatric Depression Screening   Geriatric Depression Satisfied Life :   Yes   Geriatric Depression Dropped Activities :   No   Geriatric Depression Life Empty :   No   Geriatric Depression Bored :   No   Geriatric Depression Good Spirits :   Yes   Geriatric Depression Afraid Bad Things :   No   Geriatric Depression Feel Happy :   Yes   Geriatric Depression Feel Helpless :   No   Geriatric Depression Prefer to Stay Home :   Yes   Geriatric Depression Memory Problems :   No   Geriatric Depression Wonderful Be Alive :   Yes   Geriatric Depression Feel Worthless :   No   Geriatric Depression Situation Hopeless :   No   Geriatric Depression Others Better Off :   No   Geriatric Depression Full of Energy :   Yes   Geriatric Depression Total Score :   1    Jennifer Miles CMA - 8/28/2020 8:39 AM CDT   Hearing and Vision Screening   Hearing Screen Comments :   Pt has hearing aids   Vision Screen Comments :   Sees eye doctor yearly- Jennifer Colorado CMA - 8/28/2020 8:39 AM CDT   Advance Directives   Advance Directive :   No   Jennifer Miles CMA - 8/28/2020 8:39 AM CDT   Merino Fall Risk   History of Fall in Last 3 Months Mer[place] :   No   Jennifer Miles CMA - 8/28/2020 8:39 AM CDT   Functional Assessment   Focused Functional Assessment Grid   Bathing :   Independent (2)   Dressing :   Independent (2)   Toileting :   Independent (2)   Transferring Bed or Chair :   Independent (2)   Feeding :    Independent (2)   Jennifer Miles CMA - 8/28/2020 8:39 AM CDT   Capable of Shopping :   Yes   Capable of Walking :   Yes   Capable of Housekeeping :   Yes   Capable of Managing Medications :   Yes   Capable of Handling Finances :   Yes   Jennifer Miles CMA - 8/28/2020 8:39 AM CDT

## 2022-02-16 NOTE — TELEPHONE ENCOUNTER
---------------------  From: Zonia Wilde LPN (Phone Messages Pool ([id number]_[facility]))   To: T Message Pool ([id number]_[facility]);     Sent: 7/6/2021 11:41:01 AM CDT  Subject: back pain/prednisone     Phone Message    PCP:   CHT      Time of Call:  11:32am       Person Calling:  pt wife Dana  Phone number:  [phone]    Note:   Dana calling stating pt had seen [facility] last week and was given 5 tabs of prednisone. Dana says pt was to call if it did not help and CHT would refer to a specialist that comes to [place].    Per CHT note 6/29:  He will check with a back surgeon, Dr. Shah.  F/U PRN.     If prednisone helps will consider injection by Dr. Paez..       Dana says they did not check with Dr. Shah. She says they might but if they can be seen here it is closer.    Dana was informed [facility]HT out of clinic until Thursday and message will be forwarded.    Last office visit and reason:  6/29/21 Back Pain, [place]  ** Submitted: **  Order:Referral (Request)  Details:  7/6/2021 7:02 PM CDT, Referred to: Orthopaedics, Referred to: Dr. Paez, Reason for referral: Back Pain, Low back pain         Signed by Mike Strong MD  7/7/2021 12:02:00 AM [facility]---------------------  From: Mike Strong MD (T Message Pool ([id number]_[facility]))   To: Phone Messages Pool ([id number]_[place] [facility] [place]);     Sent: 7/6/2021 7:02:53 PM CDT  Subject: RE: back pain/prednisone     Please call and let them know there is a referral in place for Dr. Paez.---------------------  From: Kailey Quijano CMA (Phone Messages Pool ([id number]_[facility]))   To: Referral Coordinators Pool ([id number]_[facility]);     Sent: 7/7/2021 8:21:00 AM CDT  Subject: FW: back pain/prednisone

## 2022-02-16 NOTE — TELEPHONE ENCOUNTER
---------------------  From: Jovana Chopra CMA (Phone Messages Pool ([id number]_[facility]))   To: [facility] Message [facility] ([id number]_[facility]);     Sent: 6/5/2020 10:25:48 AM CDT  Subject: Meclizine refill request     Phone message    PCP: [facility]    Person calling: wife  Phone number: [phone]  Time message left: 0913  Return call time: _    Reason: Pt wife called and left a message requesting that refill on Meclizine be sent to [facility]. Please   advise.      6/25/19 meclizine 25 mg oral tablet: = 1 tab(s) ( 25 mg ), po, tid, PRN: as needed for dizziness, # 90 tab(s), 0 Refill(s) PRN:as needed for dizziness     LOV: 5/1/20 HTN with C[facility]      Transferred to: [facility]          MIGUEL Chopra CMA---------------------  From: Bernarda Etienne CMA ([facility] Message Pool ([id number]_[facility]))   To: Mike Strong MD;     Sent: 6/5/2020 11:01:44 AM CDT  Subject: FW: Meclizine refill request  ** Submitted: **  Order:meclizine (meclizine 25 mg oral tablet)  1 tab(s)  po  tid  Qty:  90 tab(s)        Refills:  0          Substitutions Allowed     PRN  as needed for dizziness      Route To Pharmacy - [facility]    Signed by Mike Strong MD  6/5/2020 4:28:00 PM---------------------  From: Mike Strong MD   To: [facility] Pool ([id number]_[facility]);     Sent: 6/5/2020 11:29:23 AM CDT  Subject: RE: Meclizine refill request     Abdullahi Call    Time: 1:24pm    Note: Called to let patient know that Rx was sent to the pharmacy

## 2022-03-02 VITALS
DIASTOLIC BLOOD PRESSURE: 81 MMHG | WEIGHT: 194 LBS | RESPIRATION RATE: 20 BRPM | SYSTOLIC BLOOD PRESSURE: 138 MMHG | OXYGEN SATURATION: 98 % | HEART RATE: 71 BPM | BODY MASS INDEX: 30.38 KG/M2

## 2022-03-02 NOTE — TELEPHONE ENCOUNTER
Entered by Rizwan Noe PA-C on January 26, 2022 2:36:06 PM CST  ---------------------  From: Rizwan Noe PA-C   To: [facility]    Sent: 1/26/2022 2:36:06 PM CST  Subject: Medication Management     ** Submitted: **  Complete:meclizine (meclizine 25 mg oral tablet)   Signed by Rizwan Noe PA-C  1/26/2022 8:36:00 PM [facility]    ** Approved with modifications: **  meclizine (meclizine 25 mg tablet)  TAKE ONE Tablet  BY MOUTH THREE TIMES DAILY AS NEEDED dizziness  Qty:  270 tab(s)        Days Supply:  90        Refills:  3          Substitutions Allowed     Route To Pharmacy - [facility]               ------------------------------------------  From: [facility]  To: Rizwan Noe PA-C  Sent: January 25, 2022 2:12:15 PM CST  Subject: Medication Management  Due: January 21, 2022 11:05:10 AM CST     ** On Hold Pending Signature **     Drug: meclizine (meclizine 25 mg oral tablet), 1 tab(s) Oral tid,PRN:as needed for dizziness  Quantity: 270 tab(s)  Days Supply: 0  Refills: 2  Substitutions Allowed  Notes from Pharmacy:     Dispensed Drug: meclizine (meclizine 25 mg oral tablet), TAKE ONE Tablet BY MOUTH THREE TIMES DAILY AS NEEDED dizziness  Quantity: 270 tab(s)  Days Supply: 90  Refills: 3  Substitutions Allowed  Notes from Pharmacy:  ------------------------------------------

## 2022-03-02 NOTE — NURSING NOTE
Comprehensive Intake Entered On:  2/15/2022 11:27 AM CST    Performed On:  2/15/2022 11:18 AM CST by Kailey Quijano CMA               Summary   Chief Complaint :   Hospital follow up   Advance Directive :   No   Menstrual Status :   N/A   Weight Measured :   194 lb(Converted to: 194 lb 0 oz, 87.997 kg)    Height/Length Estimated :   67.5 in(Converted to: 5 ft 7 in, 171.45 cm)    Systolic Blood Pressure :   158 mmHg (HI)    Diastolic Blood Pressure :   98 mmHg (HI)    Mean Arterial Pressure :   118 mmHg   Peripheral Pulse Rate :   71 bpm   BP Site :   Right arm   BP Method :   Manual   Respiratory Rate :   20 br/min   Oxygen Saturation :   98 %   Kailey Quijano CMA - 2/15/2022 11:18 AM CST   Health Status   Allergies Verified? :   Yes   Medication History Verified? :   Yes   Immunizations Current :   Yes   Medical History Verified? :   Yes   Pre-Visit Planning Status :   Completed   Tobacco Use? :   Never smoker   Kailey Quijano CMA - 2/15/2022 11:18 AM CST   Consents   Consent for Immunization Exchange :   Consent Granted   Consent for Immunizations to Providers :   Consent Granted   Kailey Quijano CMA - 2/15/2022 11:18 AM CST   Meds / Allergies   (As Of: 2/15/2022 11:27:06 AM CST)   Allergies (Active)   No Known Medication Allergies  Estimated Onset Date:   Unspecified ; Created By:   Daina Alvarez CMA; Reaction Status:   Active ; Category:   Drug ; Substance:   No Known Medication Allergies ; Type:   Allergy ; Updated By:   Daina Alvarez CMA; Reviewed Date:   2/15/2022 11:27 AM CST        Medication List   (As Of: 2/15/2022 11:27:06 AM CST)   Prescription/Discharge Order    atorvastatin  :   atorvastatin ; Status:   Prescribed ; Ordered As Mnemonic:   atorvastatin 40 mg oral tablet ; Simple Display Line:   1 tab(s), Oral, daily, 90 tab(s), 3 Refill(s) ; Ordering Provider:   Rizwan Noe PA-C; Catalog Code:   atorvastatin ; Order Dt/Tm:   8/20/2021 8:59:35 AM CDT          azithromycin  :   azithromycin ; Status:    Completed ; Ordered As Mnemonic:   azithromycin 250 mg oral tablet ; Simple Display Line:   1 packet(s), PO, Once, as directed on package labeling, 6 tab(s), 0 Refill(s) ; Ordering Provider:   Mike Strong MD; Catalog Code:   azithromycin ; Order Dt/Tm:   3/9/2021 11:08:11 AM CST          diclofenac topical  :   diclofenac topical ; Status:   Prescribed ; Ordered As Mnemonic:   Voltaren Arthritis Pain 1% topical gel ; Simple Display Line:   2 gm, Topical, qid, 240 gm, 5 Refill(s) ; Ordering Provider:   Mike Strong MD; Catalog Code:   diclofenac topical ; Order Dt/Tm:   9/28/2020 9:43:49 AM CDT          fluticasone nasal  :   fluticasone nasal ; Status:   Prescribed ; Ordered As Mnemonic:   Flonase 50 mcg/inh nasal spray ; Simple Display Line:   2 spray(s), Nasal, daily, 1 EA, 11 Refill(s) ; Ordering Provider:   Jose Ham MD; Catalog Code:   fluticasone nasal ; Order Dt/Tm:   10/30/2020 4:07:20 PM CDT          fluticasone nasal  :   fluticasone nasal ; Status:   Prescribed ; Ordered As Mnemonic:   fluticasone 50 mcg/inh nasal spray ; Simple Display Line:   See Instructions, 2 spray(s)   in each nostril daily, 15.8 mL, 5 Refill(s) ; Ordering Provider:   Mike Strong MD; Catalog Code:   fluticasone nasal ; Order Dt/Tm:   3/9/2021 11:05:52 AM CST          lisinopril  :   lisinopril ; Status:   Prescribed ; Ordered As Mnemonic:   lisinopril 20 mg oral tablet ; Simple Display Line:   2 tab(s), Oral, daily, 60 tab(s), 0 Refill(s) ; Ordering Provider:   Mike Strong MD; Catalog Code:   lisinopril ; Order Dt/Tm:   1/30/2022 9:44:12 AM CST          meclizine  :   meclizine ; Status:   Prescribed ; Ordered As Mnemonic:   meclizine 25 mg oral tablet ; Simple Display Line:   1 tab(s), Oral, tid, dizziness., PRN: AS NEEDED, 270 tab(s), 3 Refill(s) ; Ordering Provider:   Rizwan Noe PA-C; Catalog Code:   meclizine ; Order Dt/Tm:   1/26/2022 2:36:02 PM CST          metoprolol  :    metoprolol ; Status:   Prescribed ; Ordered As Mnemonic:   Metoprolol Succinate  mg oral tablet, extended release ; Simple Display Line:   1 tab(s), Oral, daily, 90 tab(s), 3 Refill(s) ; Ordering Provider:   Rizwan Noe PA-C; Catalog Code:   metoprolol ; Order Dt/Tm:   11/11/2021 8:24:16 AM CST          Miscellaneous Rx Supply  :   Miscellaneous Rx Supply ; Status:   Prescribed ; Ordered As Mnemonic:   CPAP +7 cm H2o ; Simple Display Line:   See Instructions, Heated humidifier, heated tubing, filters, nasal or full face mask of choice with headgear.  Replacement cushions and supplies as needed.  Months = 99 (Lifetime), 1 EA, 0 Refill(s) ; Ordering Provider:   Jose Ham MD; Catalog Code:   Miscellaneous Rx Supply ; Order Dt/Tm:   10/30/2020 4:00:50 PM CDT          predniSONE  :   predniSONE ; Status:   Completed ; Ordered As Mnemonic:   predniSONE 20 mg oral tablet ; Simple Display Line:   40 mg, 2 tab(s), PO, Daily, for 5 day(s), 10 tab(s), 0 Refill(s) ; Ordering Provider:   Mike Strong MD; Catalog Code:   predniSONE ; Order Dt/Tm:   6/29/2021 12:05:28 PM CDT

## 2022-03-02 NOTE — TELEPHONE ENCOUNTER
Entered by Rizwan Noe PA-C on December 31, 2021 8:01:42 AM CST  ---------------------  From: Rizwan Noe PA-C   To: [facility]    Sent: 12/31/2021 8:01:42 AM CST  Subject: Medication Management     ** Submitted: **  Complete:lisinopril (lisinopril 20 mg oral tablet)   Signed by Rizwan Noe PA-C  12/31/2021 2:01:00 PM [facility]    ** Approved with modifications: **  lisinopril (lisinopril 20 mg tablet)  TAKE TWO Tablet BY MOUTH EVERY DAY  Qty:  60 tab(s)        Days Supply:  30        Refills:  0          Substitutions Allowed     Route To Pharmacy - [facility]   Signed by Rizwan Noe PA-C            ------------------------------------------  From: [facility]  To: Mike Strong MD  Sent: December 28, 2021 11:28:56 AM CST  Subject: Medication Management  Due: December 15, 2021 8:09:06 PM CST     ** On Hold Pending Signature **     Drug: lisinopril (lisinopril 20 mg oral tablet), 2 tab(s) Oral daily  Quantity: 60 tab(s)  Days Supply: 0  Refills: 0  Substitutions Allowed  Notes from Pharmacy: Overdue for labs/annual exam.     Dispensed Drug: lisinopril (lisinopril 20 mg oral tablet), TAKE TWO Tablet BY MOUTH EVERY DAY  Quantity: 60 tab(s)  Days Supply: 30  Refills: 0  Substitutions Allowed  Notes from Pharmacy:  ------------------------------------------

## 2022-03-02 NOTE — PROGRESS NOTES
Patient:   ZHANE HOLBROOK            MRN: [id number]            FIN: [id number]               Age:   76 years     Sex:  Male     :  1945   Associated Diagnoses:   Influenza A   Author:   Tae COOPER [place]      Visit Information      Date of Service: 02/15/2022 11:14 am  Performing Location: [facility]  Encounter#: [id number]      Chief Complaint   2/15/2022 11:18 AM [facility]   Hospital follow up     Chief complaint and symptoms noted above confirmed with patient.      Subjective   Chief complaint 2/15/2022 11:18 AM [facility]   Hospital follow up  .     Influenza A positive.    Doing better now.      Health Status   Allergies:    Allergic Reactions (Selected)  No Known Medication Allergies   Medications:  (Selected)   Prescriptions  Prescribed  CPAP +7 cm H2o: CPAP +7 cm H2o, See Instructions, Instructions: Heated humidifier, heated tubing, filters, nasal or full face mask of choice with headgear.  Replacement cushions and supplies as needed.  Months = 99 (Lifetime), Supply, # 1 EA, 0 Refill(s), Type: Maint...  Flonase 50 mcg/inh nasal spray: = 2 spray(s), Nasal, daily, # 1 EA, 11 Refill(s), Type: Maintenance, Pharmacy: [facility], 2 spray(s) Nasal daily, 67.5, in, 10/30/20 15:59:00 CDT, Height Measured, 193, lb, 10/30/20 15:59:00...  Metoprolol Succinate  mg oral tablet, extended release: = 1 tab(s), Oral, daily, # 90 tab(s), 3 Refill(s), Pharmacy: [facility], TAKE ONE Tablet BY MOUTH EVERY DAY, 67, in, 21 11:34:00 CDT, Height Measured, 196, lb, 21 11:34:00 CDT, Weight Measured  Voltaren Arthritis Pain 1% topical gel: ( 2 gm ), Topical, qid, # 240 gm, 5 Refill(s), Type: Maintenance, Pharmacy: [facility], 2 gm Topical qid, 67.5, in, 20 8:49:00 CDT, Height Measured, 195, lb, 20 8:49:00 CDT, Weigh...  atorvastatin 40 mg oral tablet: = 1  tab(s), Oral, daily, # 90 tab(s), 3 Refill(s), Type: Maintenance, Pharmacy: [facility], TAKE ONE Tablet BY MOUTH EVERY DAY, 67, in, 06/29/21 11:34:00 CDT, Height Measured, 196, lb, 06/29/21 11:34:00 CDT, Weight Measured  fluticasone 50 mcg/inh nasal spray: See Instructions, Instructions: 2 spray(s)   in each nostril daily, # 15.8 mL, 5 Refill(s), Type: Maintenance, Pharmacy: [facility], 2 spray(s) ; in each nostril daily, 67.5, in, 10/30/20 15:...  lisinopril 20 mg oral tablet: = 2 tab(s), Oral, daily, # 60 tab(s), 0 Refill(s), Pharmacy: [facility], TAKE TWO Tablet BY MOUTH EVERY DAY, 67, in, 12/21/21 15:13:00 CST, Height Measured, 194.4, lb, 12/21/21 15:13:00 CST, Weight Measured  meclizine 25 mg oral tablet: = 1 tab(s), Oral, tid, Instructions: dizziness., PRN: AS NEEDED, # 270 tab(s), 3 Refill(s), Pharmacy: [facility], TAKE ONE Tablet  BY MOUTH THREE TIMES DAILY AS NEEDED dizziness, 67, in, 12/21/21 15:13:00 CST, Height Measured, 194.4, l...   Problem list:    All Problems (Selected)  Allergic rhinitis / SNOMED CT 948622631 / Confirmed  CORBY (Obstructive Sleep Apnea) / SNOMED CT 1129039693 / Confirmed  Nonischemic cardiomyopathy / SNOMED CT 998101626 / Confirmed  Colon adenomas / SNOMED CT 3313577053 / Confirmed  Obesity / SNOMED CT 5690905633 / Probable  Benign Essential Hypertension / SNOMED CT 2258270 / Confirmed  Chronic low back pain / SNOMED CT 639036157 / Confirmed      Objective   Vital Signs   2/15/2022 11:18 AM CST Peripheral Pulse Rate 71 bpm    Respiratory Rate 20 br/min    Systolic Blood Pressure 158 mmHg  HI    Diastolic Blood Pressure 98 mmHg  HI    Mean Arterial Pressure 118 mmHg    BP Site Right arm    BP Method Manual    Oxygen Saturation 98 %      Measurements from flowsheet : Measurements   2/15/2022 11:18 AM CST Height/Length Estimated 67.5 in    Weight Measured - Standard 194 lb       Documented vital signs:       Blood Pressure: Systolic  138  mmHg, Diastolic  81  mmHg.    Eye:  Normal conjunctiva.    HENT:  Normocephalic.    Neck:  Supple, Non-tender.    Respiratory:  Lungs are clear to auscultation, Respirations are non-labored.    Cardiovascular:  Normal rate, Regular rhythm.       Results Review   Results review   Lab results   12/21/2021 3:38 PM CST Sodium Level 140 mmol/L    Potassium Level 4.5 mmol/L    Chloride Level 105 mmol/L    CO2 Level 26 mmol/L    Glucose Level 103 mg/dL  HI    BUN 32 mg/dL  HI    Creatinine 1.17 mg/dL    BUN/Creat Ratio 27  HI    eGFR 59 mL/min/1.73m2  LOW    eGFR  69 mL/min/1.73m2    Calcium Level 9.5 mg/dL    Cholesterol 159 mg/dL    Non-    HDL 52 mg/dL    Chol/HDL Ratio 3.1    LDL 92    Triglyceride 64 mg/dL    PSA 1.94 ng/mL    WBC 7.7    RBC 5.35    Hgb 15.3 gm/dL    Hct 46.0 %    MCV 86.0 fL    MCH 28.6 pg    MCHC 33.3 gm/dL    RDW 12.3 %    Platelet 283    MPV 11.3 fL         Impression and Plan   Assessment and Plan:          Diagnosis: Influenza A (MFR68-PJ J10.1).         Course: Improving.    Orders     Push fluids, rest f/u PRN.     .

## 2022-03-02 NOTE — TELEPHONE ENCOUNTER
Entered by Mike Strong MD on January 30, 2022 9:44:16 AM CST  ---------------------  From: Mike Strong MD   To: [employer] [facility]    Sent: 1/30/2022 9:44:16 AM CST  Subject: Medication Management     ** Submitted: **  Complete:lisinopril (lisinopril 20 mg oral tablet)   Signed by Mike Strong MD  1/30/2022 3:44:00 PM [facility]    ** Approved **  lisinopril (lisinopril 20 mg tablet)  TAKE TWO Tablet BY MOUTH EVERY DAY  Qty:  60 tab(s)        Days Supply:  30        Refills:  0          Substitutions Allowed     [facility]  [place]               ------------------------------------------  [facility]  To: Mike Strong MD  Sent: January 29, 2022 8:59:57 AM CST  Subject: Medication Management  Due: January 21, 2022 11:04:18 AM CST     ** On Hold Pending Signature **     Drug: lisinopril (lisinopril 20 mg oral tablet), TAKE TWO Tablet BY MOUTH EVERY DAY  Quantity: 60 tab(s)  Days Supply: 30  Refills: 0  Substitutions Allowed  Notes from Pharmacy:     Dispensed Drug: lisinopril (lisinopril 20 mg oral tablet), TAKE TWO Tablet BY MOUTH EVERY DAY  Quantity: 60 tab(s)  Days Supply: 30  Refills: 0  Substitutions Allowed  Notes from Pharmacy:  ------------------------------------------

## 2022-03-14 DIAGNOSIS — I10 ESSENTIAL (PRIMARY) HYPERTENSION: ICD-10-CM

## 2022-03-16 RX ORDER — LISINOPRIL 20 MG/1
TABLET ORAL
Qty: 60 TABLET | Refills: 0 | Status: SHIPPED | OUTPATIENT
Start: 2022-03-16 | End: 2022-04-16

## 2022-04-13 DIAGNOSIS — I10 ESSENTIAL (PRIMARY) HYPERTENSION: ICD-10-CM

## 2022-04-16 RX ORDER — LISINOPRIL 20 MG/1
TABLET ORAL
Qty: 180 TABLET | Refills: 0 | Status: SHIPPED | OUTPATIENT
Start: 2022-04-16 | End: 2022-04-21

## 2022-04-16 NOTE — TELEPHONE ENCOUNTER
Request for lisinopril.    Last visit 2/15/2022 for hospital follow up. BP controlled. Last HTN follow up 12/2021.    Will refill x 90 days

## 2022-04-20 PROBLEM — D12.6 ADENOMA OF LARGE INTESTINE: Status: ACTIVE | Noted: 2022-04-20

## 2022-04-20 PROBLEM — G89.29 CHRONIC LOW BACK PAIN: Status: ACTIVE | Noted: 2022-04-20

## 2022-04-20 PROBLEM — I42.9 CARDIOMYOPATHY (H): Status: ACTIVE | Noted: 2022-04-20

## 2022-04-20 PROBLEM — E66.9 OBESITY: Status: ACTIVE | Noted: 2022-04-20

## 2022-04-20 PROBLEM — I10 BENIGN ESSENTIAL HYPERTENSION: Status: ACTIVE | Noted: 2022-04-20

## 2022-04-20 PROBLEM — J30.9 ALLERGIC RHINITIS: Status: ACTIVE | Noted: 2022-04-20

## 2022-04-20 PROBLEM — M54.50 CHRONIC LOW BACK PAIN: Status: ACTIVE | Noted: 2022-04-20

## 2022-04-20 RX ORDER — MECLIZINE HYDROCHLORIDE 25 MG/1
25 TABLET ORAL 3 TIMES DAILY PRN
COMMUNITY
Start: 2020-08-28 | End: 2023-07-24

## 2022-04-20 RX ORDER — FLUTICASONE PROPIONATE 50 MCG
2 SPRAY, SUSPENSION (ML) NASAL DAILY
COMMUNITY
Start: 2020-10-30 | End: 2023-10-19

## 2022-04-20 RX ORDER — ATORVASTATIN CALCIUM 40 MG/1
1 TABLET, FILM COATED ORAL DAILY
COMMUNITY
Start: 2021-08-20 | End: 2022-04-21

## 2022-04-21 ENCOUNTER — OFFICE VISIT (OUTPATIENT)
Dept: FAMILY MEDICINE | Facility: CLINIC | Age: 77
End: 2022-04-21
Payer: COMMERCIAL

## 2022-04-21 VITALS
SYSTOLIC BLOOD PRESSURE: 135 MMHG | WEIGHT: 198 LBS | RESPIRATION RATE: 20 BRPM | OXYGEN SATURATION: 99 % | HEART RATE: 60 BPM | BODY MASS INDEX: 31.01 KG/M2 | DIASTOLIC BLOOD PRESSURE: 86 MMHG

## 2022-04-21 DIAGNOSIS — I10 ESSENTIAL (PRIMARY) HYPERTENSION: ICD-10-CM

## 2022-04-21 DIAGNOSIS — Z23 ENCOUNTER FOR IMMUNIZATION: ICD-10-CM

## 2022-04-21 DIAGNOSIS — I25.5 ISCHEMIC CARDIOMYOPATHY: ICD-10-CM

## 2022-04-21 DIAGNOSIS — N40.0 BENIGN PROSTATIC HYPERPLASIA, UNSPECIFIED WHETHER LOWER URINARY TRACT SYMPTOMS PRESENT: Primary | ICD-10-CM

## 2022-04-21 PROBLEM — R53.1 GENERALIZED WEAKNESS: Status: ACTIVE | Noted: 2022-02-07

## 2022-04-21 PROBLEM — N18.30 CHRONIC KIDNEY DISEASE, STAGE 3 (H): Status: ACTIVE | Noted: 2022-02-07

## 2022-04-21 PROBLEM — I25.10 CORONARY ARTERY DISEASE INVOLVING NATIVE CORONARY ARTERY OF NATIVE HEART WITHOUT ANGINA PECTORIS: Status: ACTIVE | Noted: 2018-04-10

## 2022-04-21 PROBLEM — R51.9 HEADACHE: Status: ACTIVE | Noted: 2022-02-07

## 2022-04-21 PROBLEM — E78.5 HYPERLIPIDEMIA: Status: ACTIVE | Noted: 2022-02-07

## 2022-04-21 PROBLEM — H91.90 HEARING LOSS: Status: ACTIVE | Noted: 2022-04-21

## 2022-04-21 PROBLEM — K80.00 CALCULUS OF GALLBLADDER WITH ACUTE CHOLECYSTITIS WITHOUT OBSTRUCTION: Status: ACTIVE | Noted: 2019-09-20

## 2022-04-21 PROCEDURE — 91306 COVID-19,PF,MODERNA (18+ YRS BOOSTER .25ML): CPT | Performed by: FAMILY MEDICINE

## 2022-04-21 PROCEDURE — 99214 OFFICE O/P EST MOD 30 MIN: CPT | Mod: 25 | Performed by: FAMILY MEDICINE

## 2022-04-21 PROCEDURE — 0064A COVID-19,PF,MODERNA (18+ YRS BOOSTER .25ML): CPT | Performed by: FAMILY MEDICINE

## 2022-04-21 RX ORDER — FINASTERIDE 5 MG/1
5 TABLET, FILM COATED ORAL DAILY
Qty: 90 TABLET | Refills: 1 | Status: SHIPPED | OUTPATIENT
Start: 2022-04-21 | End: 2023-10-19

## 2022-04-21 RX ORDER — LISINOPRIL 20 MG/1
TABLET ORAL
Qty: 180 TABLET | Refills: 3 | Status: SHIPPED | OUTPATIENT
Start: 2022-04-21 | End: 2023-06-21

## 2022-04-21 RX ORDER — METOPROLOL SUCCINATE 100 MG/1
1 TABLET, EXTENDED RELEASE ORAL DAILY
COMMUNITY
Start: 2021-11-11 | End: 2022-04-21

## 2022-04-21 RX ORDER — ATORVASTATIN CALCIUM 40 MG/1
40 TABLET, FILM COATED ORAL DAILY
Qty: 90 TABLET | Refills: 3 | Status: SHIPPED | OUTPATIENT
Start: 2022-04-21 | End: 2022-09-09

## 2022-04-21 RX ORDER — METOPROLOL SUCCINATE 100 MG/1
100 TABLET, EXTENDED RELEASE ORAL DAILY
Qty: 90 TABLET | Refills: 3 | Status: SHIPPED | OUTPATIENT
Start: 2022-04-21 | End: 2023-05-17

## 2022-04-21 RX ORDER — CALCIUM CARBONATE 500(1250)
500 TABLET,CHEWABLE ORAL 3 TIMES DAILY PRN
COMMUNITY
End: 2023-09-11

## 2022-04-21 RX ORDER — LATANOPROST 50 UG/ML
1 SOLUTION/ DROPS OPHTHALMIC EVERY EVENING
COMMUNITY

## 2022-04-21 ASSESSMENT — PATIENT HEALTH QUESTIONNAIRE - PHQ9
10. IF YOU CHECKED OFF ANY PROBLEMS, HOW DIFFICULT HAVE THESE PROBLEMS MADE IT FOR YOU TO DO YOUR WORK, TAKE CARE OF THINGS AT HOME, OR GET ALONG WITH OTHER PEOPLE: SOMEWHAT DIFFICULT
SUM OF ALL RESPONSES TO PHQ QUESTIONS 1-9: 4
SUM OF ALL RESPONSES TO PHQ QUESTIONS 1-9: 4

## 2022-04-21 NOTE — PROGRESS NOTES
"  Assessment & Plan     Ischemic cardiomyopathy     - metoprolol succinate ER (TOPROL-XL) 100 MG 24 hr tablet; Take 1 tablet (100 mg) by mouth daily  - atorvastatin (LIPITOR) 40 MG tablet; Take 1 tablet (40 mg) by mouth daily    Essential (primary) hypertension    - metoprolol succinate ER (TOPROL-XL) 100 MG 24 hr tablet; Take 1 tablet (100 mg) by mouth daily  - atorvastatin (LIPITOR) 40 MG tablet; Take 1 tablet (40 mg) by mouth daily  - lisinopril (ZESTRIL) 20 MG tablet; TAKE TWO Tablets BY MOUTH EVERY DAY    Benign prostatic hyperplasia, unspecified whether lower urinary tract symptoms present    - finasteride (PROSCAR) 5 MG tablet; Take 1 tablet (5 mg) by mouth daily      30 minutes spent on the date of the encounter doing chart review, history and exam, documentation and further activities per the note       BMI:   Estimated body mass index is 31.01 kg/m  as calculated from the following:    Height as of 12/21/21: 1.702 m (5' 7\").    Weight as of this encounter: 89.8 kg (198 lb).           Return in about 6 months (around 10/21/2022).    Mike Strong MD  [facility]    Rukhsana Chung is a 77 year old who presents for the following health issues: Recheck on multiple including HTN, urinary incontinence and requests covid booster.    History of Present Illness       Mental Health Follow-up:                    Today's PHQ-9         PHQ-9 Total Score: 4  PHQ-9 Q9 Thoughts of better off dead/self-harm past 2 weeks :   (P) Not at all    How difficult have these problems made it for you to do your work, take care of things at home, or get along with other people: Somewhat difficult        Hypertension: He presents for follow up of hypertension.  He does not check blood pressure  regularly outside of the clinic. Outside blood pressures have been over 140/90. He follows a low salt diet.     Reason for visit:  Urinary incontinence and HTN  Symptom onset:  More than a " month  Symptom intensity:  Mild  Symptom progression:  Staying the same  Had these symptoms before:  No  What makes it worse:  Drinking water before bed    He eats 0-1 servings of fruits and vegetables daily.He consumes 0 sweetened beverage(s) daily.He exercises with enough effort to increase his heart rate 20 to 29 minutes per day.  He exercises with enough effort to increase his heart rate 7 days per week.   He is taking medications regularly.       Hypertension Follow-up      Do you check your blood pressure regularly outside of the clinic? Yes     Are you following a low salt diet? Yes    Are your blood pressures ever more than 140 on the top number (systolic) OR more   than 90 on the bottom number (diastolic), for example 140/90? No    Concern - BPH, Nocturia  Onset: months  Description: Nocturia  Intensity: moderate  Progression of Symptoms:  worsening  Accompanying Signs & Symptoms: decreased stream  Previous history of similar problem: no  Precipitating factors:        Worsened by: n/a  Alleviating factors:        Improved by: n/a  Therapies tried and outcome:  none       Review of Systems   Constitutional, HEENT, cardiovascular, pulmonary, gi and gu systems are negative, except as otherwise noted.      Objective    BP (!) 168/86 (BP Location: Right arm, Patient Position: Sitting)   Pulse 60   Resp 20   Wt 89.8 kg (198 lb)   SpO2 99%   BMI 31.01 kg/m    Body mass index is 31.01 kg/m .  Physical Exam   GENERAL: healthy, alert and no distress  NECK: no adenopathy, no asymmetry, masses, or scars and thyroid normal to palpation  RESP: lungs clear to auscultation - no rales, rhonchi or wheezes  CV: regular rate and rhythm, normal S1 S2, no S3 or S4, no murmur, click or rub, no peripheral edema and peripheral pulses strong  ABDOMEN: soft, nontender, no hepatosplenomegaly, no masses and bowel sounds normal  MS: no gross musculoskeletal defects noted, no edema        Labs in [facility] reviewed and up to date.

## 2022-04-22 ASSESSMENT — PATIENT HEALTH QUESTIONNAIRE - PHQ9: SUM OF ALL RESPONSES TO PHQ QUESTIONS 1-9: 4

## 2022-05-03 ENCOUNTER — TELEPHONE (OUTPATIENT)
Dept: FAMILY MEDICINE | Facility: CLINIC | Age: 77
End: 2022-05-03
Payer: COMMERCIAL

## 2022-05-03 NOTE — TELEPHONE ENCOUNTER
Reason for call:  Patient reporting a symptom    Symptom or request: reaction    Duration (how long have symptoms been present): took 1 pill on Fri and pain around heart for prostate medication    Have you been treated for this before? No    Additional comments:     Phone Number patient can be reached at:  Home number on file [phone] (home)    Best Time:  anytime    Can we leave a detailed message on this number:  YES    Call taken on 5/3/2022 at 1:39 PM by Juliette Bauman

## 2022-09-01 ENCOUNTER — OFFICE VISIT (OUTPATIENT)
Dept: FAMILY MEDICINE | Facility: CLINIC | Age: 77
End: 2022-09-01
Payer: COMMERCIAL

## 2022-09-01 VITALS
HEART RATE: 66 BPM | WEIGHT: 188.4 LBS | OXYGEN SATURATION: 98 % | TEMPERATURE: 99.2 F | HEIGHT: 67 IN | DIASTOLIC BLOOD PRESSURE: 74 MMHG | SYSTOLIC BLOOD PRESSURE: 120 MMHG | BODY MASS INDEX: 29.57 KG/M2

## 2022-09-01 DIAGNOSIS — I25.10 CORONARY ARTERY DISEASE INVOLVING NATIVE CORONARY ARTERY OF NATIVE HEART WITHOUT ANGINA PECTORIS: ICD-10-CM

## 2022-09-01 DIAGNOSIS — M51.9 LUMBAR DISC DISEASE: ICD-10-CM

## 2022-09-01 DIAGNOSIS — N30.00 ACUTE CYSTITIS WITHOUT HEMATURIA: Primary | ICD-10-CM

## 2022-09-01 PROBLEM — I21.4 NSTEMI (NON-ST ELEVATED MYOCARDIAL INFARCTION) (H): Status: ACTIVE | Noted: 2022-08-21

## 2022-09-01 LAB
ALBUMIN UR-MCNC: NEGATIVE MG/DL
APPEARANCE UR: CLEAR
BACTERIA #/AREA URNS HPF: ABNORMAL /HPF
BILIRUB UR QL STRIP: NEGATIVE
COLOR UR AUTO: YELLOW
GLUCOSE UR STRIP-MCNC: NEGATIVE MG/DL
HGB UR QL STRIP: ABNORMAL
KETONES UR STRIP-MCNC: NEGATIVE MG/DL
LEUKOCYTE ESTERASE UR QL STRIP: NEGATIVE
NITRATE UR QL: NEGATIVE
PH UR STRIP: 5.5 [PH] (ref 5–7)
RBC #/AREA URNS AUTO: ABNORMAL /HPF
SP GR UR STRIP: 1.02 (ref 1–1.03)
SQUAMOUS #/AREA URNS AUTO: ABNORMAL /LPF
UROBILINOGEN UR STRIP-ACNC: 0.2 E.U./DL
WBC #/AREA URNS AUTO: ABNORMAL /HPF

## 2022-09-01 PROCEDURE — 81001 URINALYSIS AUTO W/SCOPE: CPT | Performed by: FAMILY MEDICINE

## 2022-09-01 PROCEDURE — 99213 OFFICE O/P EST LOW 20 MIN: CPT | Performed by: FAMILY MEDICINE

## 2022-09-01 NOTE — PROGRESS NOTES
"  Assessment & Plan     Acute cystitis without hematuria  Doing well  Will recheck U/A and document resolution    Coronary artery disease involving native coronary artery of native heart without angina pectoris  Stable  Reviewed with patient and family        BMI:   Estimated body mass index is 29.51 kg/m  as calculated from the following:    Height as of this encounter: 1.702 m (5' 7\").    Weight as of this encounter: 85.5 kg (188 lb 6.4 oz).       F/U PRN    Mike Strong MD  [facility]    Rukhsana Chung is a 77 year old accompanied by his spouse, presenting for the following health issues:  Hospital F/U (8/21/22-8/24/22 at [facility] for UTI/chest pain )      HPI       Hospital Follow-up Visit:    Hospital/Nursing Home/ Rehab Facility: [facility]  Date of Admission: 8/21/22  Date of Discharge: 8/24/22  Reason(s) for Admission: UTI/chest pain    Was your hospitalization related to COVID-19? No   Problems taking medications regularly:  None  Medication changes since discharge: levofloxacin (completed)  Problems adhering to non-medication therapy:  None    Summary of hospitalization:  CareEverywhere information obtained and reviewed  Diagnostic Tests/Treatments reviewed.  Follow up needed: none  Other Healthcare Providers Involved in Patient s Care:         None  Update since discharge: improved.         Post Medication Reconciliation Status:        Plan of care communicated with patient and family           Vascular Disease Follow-up      How often do you take nitroglycerin? Never    Do you take an aspirin every day? Yes      UTI Follow up doing well.  Still has nocturia    Review of Systems   Constitutional, HEENT, cardiovascular, pulmonary, gi and gu systems are negative, except as otherwise noted.      Objective    /74 (BP Location: Right arm)   Pulse 66   Temp 99.2  F (37.3  C) (Tympanic)   Ht 1.702 m (5' 7\")   Wt 85.5 kg (188 lb 6.4 oz)   SpO2 98%   BMI " 29.51 kg/m    Body mass index is 29.51 kg/m .  Physical Exam   GENERAL: healthy, alert and no distress  NECK: no adenopathy, no asymmetry, masses, or scars and thyroid normal to palpation  RESP: lungs clear to auscultation - no rales, rhonchi or wheezes  CV: regular rate and rhythm, normal S1 S2, no S3 or S4, no murmur, click or rub, no peripheral edema and peripheral pulses strong  ABDOMEN: soft, nontender, no hepatosplenomegaly, no masses and bowel sounds normal  MS: no gross musculoskeletal defects noted, no edema

## 2022-09-09 DIAGNOSIS — I25.5 ISCHEMIC CARDIOMYOPATHY: ICD-10-CM

## 2022-09-09 DIAGNOSIS — I10 ESSENTIAL (PRIMARY) HYPERTENSION: ICD-10-CM

## 2022-09-09 RX ORDER — ATORVASTATIN CALCIUM 40 MG/1
TABLET, FILM COATED ORAL
Qty: 90 TABLET | Refills: 0 | Status: SHIPPED | OUTPATIENT
Start: 2022-09-09 | End: 2023-06-26

## 2022-09-09 NOTE — TELEPHONE ENCOUNTER
Prescription approved per [facility] Refill Protocol.    Last Written Prescription Date: 4/21/22  Last Fill Quantity: 90,  # refills: 3   Last office visit: 9/1/2022 with prescribing provider

## 2023-01-02 ENCOUNTER — ALLIED HEALTH/NURSE VISIT (OUTPATIENT)
Dept: FAMILY MEDICINE | Facility: CLINIC | Age: 78
End: 2023-01-02
Payer: COMMERCIAL

## 2023-01-02 DIAGNOSIS — Z23 NEED FOR VACCINATION: Primary | ICD-10-CM

## 2023-01-02 PROCEDURE — 91313 COVID-19 VACCINE BIVALENT BOOSTER 18+ (MODERNA): CPT

## 2023-01-02 PROCEDURE — 0134A COVID-19 VACCINE BIVALENT BOOSTER 18+ (MODERNA): CPT

## 2023-01-02 PROCEDURE — 99207 PR NO CHARGE NURSE ONLY: CPT

## 2023-01-03 ENCOUNTER — TELEPHONE (OUTPATIENT)
Dept: FAMILY MEDICINE | Facility: CLINIC | Age: 78
End: 2023-01-03

## 2023-01-03 NOTE — TELEPHONE ENCOUNTER
General Call    Contacts       Type Contact Phone/Fax    01/03/2023 11:27 AM CST Phone (Incoming) FaustinaSheldon (Self) [phone] (H)        Reason for Call:  Hearing aid brush that takes out the filter.    What are your questions or concerns: Pt called and wanted to talk to Herminia Victoria (ENT) about where he can go to get more hearing aid brush that takes out the filters?     Date of last appointment with provider: 09/01/2022    Okay to leave a detailed message?: Yes at Home number on file [phone] (home)    Shannan Drake

## 2023-01-10 NOTE — TELEPHONE ENCOUNTER
1-10-23  Pt called stated he received the brushes Herminia moise mailed him for his hearing aids, except there the wrong brushes.  Pt needs the brushes with the screw drivers at the end of the brushes  Mail to:  [address] [address]  kiesha

## 2023-01-20 NOTE — TELEPHONE ENCOUNTER
Pt stopped in clinic today in regards to these brushes - said he has the incorrect size. Informed him that we would send a msg to Dr. Victoria where she would have to address this on Tuesday when shes in.    Pt was very understanding and would like a call if this could be something he can either  or be mailed to him.    Please advise and call pt

## 2023-01-24 NOTE — TELEPHONE ENCOUNTER
I dropped them in the mail last Tuesday so he should have received them by now. I can call and let him know. I did call and left a message last week but I will call again.   ThanksMARY ANN

## 2023-06-21 DIAGNOSIS — I10 ESSENTIAL (PRIMARY) HYPERTENSION: ICD-10-CM

## 2023-06-21 RX ORDER — LISINOPRIL 20 MG/1
TABLET ORAL
Qty: 180 TABLET | Refills: 0 | Status: SHIPPED | OUTPATIENT
Start: 2023-06-21 | End: 2023-06-26

## 2023-06-21 NOTE — TELEPHONE ENCOUNTER
Routing refill request to provider for review/approval because:  Drug fails the [facility] refill protocol     Normal Creatinine 8/23/22  Normal potassium 8/24/22    Last Written Prescription Date: 4/21/22  Last Fill Quantity: 180,  # refills: 3   Last office visit: 9/1/2022

## 2023-07-24 DIAGNOSIS — R42 VERTIGO: Primary | ICD-10-CM

## 2023-07-24 RX ORDER — MECLIZINE HYDROCHLORIDE 25 MG/1
TABLET ORAL
Qty: 270 TABLET | Refills: 3 | Status: SHIPPED | OUTPATIENT
Start: 2023-07-24

## 2023-07-24 NOTE — TELEPHONE ENCOUNTER
"Routing refill request to provider for review/approval because:  Medication is reported/historical    Last Written Prescription Date:  4/20/22  Last office visit provider:  9/1/22 with bruce     Requested Prescriptions   Pending Prescriptions Disp Refills    meclizine (ANTIVERT) 25 MG tablet [Pharmacy Med Name: meclizine 25 mg tablet] 270 tablet 3     Sig: TAKE ONE Tablet BY MOUTH THREE TIMES DAILY AS NEEDED dizziness        Antivertigo/Antiemetic Agents Passed - 7/24/2023 12:13 PM        Passed - Recent (12 mo) or future (30 days) visit within the authorizing provider's specialty     Patient has had an office visit with the authorizing provider or a provider within the authorizing providers department within the previous 12 mos or has a future within next 30 days. See \"Patient Info\" tab in inbasket, or \"Choose Columns\" in Meds & Orders section of the refill encounter.              Passed - Medication is active on med list        Passed - Patient is 18 years of age or older             MED HALL RN 07/24/23 12:13 PM  "

## 2023-08-14 DIAGNOSIS — I10 ESSENTIAL (PRIMARY) HYPERTENSION: ICD-10-CM

## 2023-08-14 DIAGNOSIS — I25.5 ISCHEMIC CARDIOMYOPATHY: ICD-10-CM

## 2023-08-14 RX ORDER — METOPROLOL SUCCINATE 100 MG/1
100 TABLET, EXTENDED RELEASE ORAL DAILY
Qty: 90 TABLET | Refills: 0 | Status: SHIPPED | OUTPATIENT
Start: 2023-08-14 | End: 2023-11-22

## 2023-08-14 NOTE — TELEPHONE ENCOUNTER
Routing refill request to provider for review/approval because:  Patient needs to be seen because:  Care team please reach out to patient to schedule appointment      YULI Forman  [facility]

## 2023-08-14 NOTE — TELEPHONE ENCOUNTER
Prescription approved per [facility] Refill Protocol.     YULI Forman [facility]

## 2023-09-11 ENCOUNTER — ANCILLARY PROCEDURE (OUTPATIENT)
Dept: GENERAL RADIOLOGY | Facility: CLINIC | Age: 78
End: 2023-09-11
Attending: FAMILY MEDICINE
Payer: COMMERCIAL

## 2023-09-11 ENCOUNTER — OFFICE VISIT (OUTPATIENT)
Dept: FAMILY MEDICINE | Facility: CLINIC | Age: 78
End: 2023-09-11
Payer: COMMERCIAL

## 2023-09-11 VITALS
DIASTOLIC BLOOD PRESSURE: 85 MMHG | RESPIRATION RATE: 20 BRPM | HEIGHT: 67 IN | TEMPERATURE: 98.5 F | HEART RATE: 70 BPM | SYSTOLIC BLOOD PRESSURE: 140 MMHG | WEIGHT: 192.3 LBS | OXYGEN SATURATION: 98 % | BODY MASS INDEX: 30.18 KG/M2

## 2023-09-11 DIAGNOSIS — N18.31 STAGE 3A CHRONIC KIDNEY DISEASE (H): ICD-10-CM
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
## 2023-09-11 DIAGNOSIS — I25.10 CORONARY ARTERY DISEASE INVOLVING NATIVE CORONARY ARTERY OF NATIVE HEART WITHOUT ANGINA PECTORIS: ICD-10-CM

## 2023-09-11 DIAGNOSIS — M54.50 ACUTE LEFT-SIDED LOW BACK PAIN WITHOUT SCIATICA: Primary | ICD-10-CM

## 2023-09-11 DIAGNOSIS — M54.50 ACUTE LEFT-SIDED LOW BACK PAIN WITHOUT SCIATICA: ICD-10-CM

## 2023-09-11 LAB
ANION GAP SERPL CALCULATED.3IONS-SCNC: 10 MMOL/L (ref 7–15)
BUN SERPL-MCNC: 22.7 MG/DL (ref 8–23)
CALCIUM SERPL-MCNC: 9.6 MG/DL (ref 8.8–10.2)
CHLORIDE SERPL-SCNC: 109 MMOL/L (ref 98–107)
CHOLEST SERPL-MCNC: 101 MG/DL
CREAT SERPL-MCNC: 1.31 MG/DL (ref 0.67–1.17)
CREAT UR-MCNC: 196 MG/DL
DEPRECATED HCO3 PLAS-SCNC: 24 MMOL/L (ref 22–29)
EGFRCR SERPLBLD CKD-EPI 2021: 55 ML/MIN/1.73M2
GLUCOSE SERPL-MCNC: 83 MG/DL (ref 70–99)
HDLC SERPL-MCNC: 47 MG/DL
HGB BLD-MCNC: 13.7 G/DL (ref 13.3–17.7)
LDLC SERPL CALC-MCNC: 44 MG/DL
MICROALBUMIN UR-MCNC: 51.9 MG/L
MICROALBUMIN/CREAT UR: 26.48 MG/G CR (ref 0–17)
NONHDLC SERPL-MCNC: 54 MG/DL
POTASSIUM SERPL-SCNC: 4.6 MMOL/L (ref 3.4–5.3)
SODIUM SERPL-SCNC: 143 MMOL/L (ref 136–145)
TRIGL SERPL-MCNC: 50 MG/DL

## 2023-09-11 PROCEDURE — 85018 HEMOGLOBIN: CPT | Mod: QW | Performed by: FAMILY MEDICINE

## 2023-09-11 PROCEDURE — 82043 UR ALBUMIN QUANTITATIVE: CPT | Performed by: FAMILY MEDICINE

## 2023-09-11 PROCEDURE — 36415 COLL VENOUS BLD VENIPUNCTURE: CPT | Performed by: FAMILY MEDICINE

## 2023-09-11 PROCEDURE — 72100 X-RAY EXAM L-S SPINE 2/3 VWS: CPT | Mod: TC | Performed by: STUDENT IN AN ORGANIZED HEALTH CARE EDUCATION/TRAINING PROGRAM

## 2023-09-11 PROCEDURE — 80048 BASIC METABOLIC PNL TOTAL CA: CPT | Performed by: FAMILY MEDICINE

## 2023-09-11 PROCEDURE — 82570 ASSAY OF URINE CREATININE: CPT | Performed by: FAMILY MEDICINE

## 2023-09-11 PROCEDURE — 80061 LIPID PANEL: CPT | Performed by: FAMILY MEDICINE

## 2023-09-11 PROCEDURE — 99213 OFFICE O/P EST LOW 20 MIN: CPT | Performed by: FAMILY MEDICINE

## 2023-09-11 RX ORDER — DORZOLAMIDE HYDROCHLORIDE AND TIMOLOL MALEATE 20; 5 MG/ML; MG/ML
SOLUTION/ DROPS OPHTHALMIC
COMMUNITY
Start: 2023-09-05

## 2023-09-11 NOTE — PROGRESS NOTES
Clinical Decision Making:    At the end of the encounter, I discussed results, diagnosis, medications. Discussed red flags for immediate return to clinic/ER, as well as indications for follow up if no improvement. Patient understood and agreed to plan. Patient was stable for discharge.      ICD-10-CM    1. Acute left-sided low back pain without sciatica  M54.50 XR Lumbar Spine 2/3 Views      2. Stage 3a chronic kidney disease (H)  N18.31 Albumin Random Urine Quantitative with Creat Ratio     BASIC METABOLIC PANEL     Hemoglobin     Albumin Random Urine Quantitative with Creat Ratio     BASIC METABOLIC PANEL     Hemoglobin      3. Coronary artery disease involving native coronary artery of native heart without angina pectoris  I25.10 Lipid panel reflex to direct LDL Non-fasting     Lipid panel reflex to direct LDL Non-fasting      4. Fall, initial encounter  W19.XXXA         Acetaminophen (Tylenol) 500mg tablets.  You may take 2 tabs every 4-6 hours as needed  Ibuprofen (Advil, Motrin) 200mg tablets.  You may take 3 tabs every 6-8 hours as needed with food.  Patient is also due for some labs and those are ordered today.  Recommended follow-up with his primary in a month for annual exam      There are no Patient Instructions on file for this visit.   Return in about 4 weeks (around 10/9/2023) for Follow up, with primary provider.      chief complaint    HPI:  Sheldon Weems is a 78 year old male who presents today complaining of left-sided low back pain since falling on Thursday, September 7.  He fell 4 days ago when he was in his workshop.  There were piles of things that he was walking between and he tripped and fell.  His left leg has been weak and numb since having a pinched nerve years ago.  He also has had a half knee replacement on the left leg.  He also has a history of 3 back operations.    History obtained from spouse, chart review, and the patient.    Problem List:  2022-08: NSTEMI (non-ST elevated  "myocardial infarction) (H)  2022-04: Hearing loss  2022-04: Adenoma of large intestine  2022-04: Allergic rhinitis  2022-04: Benign essential hypertension  2022-04: Cardiomyopathy (H)  2022-04: Chronic low back pain  2022-04: Obesity  2022-02: Chronic kidney disease, stage 3 (H)  2022-02: Generalized weakness  2022-02: Headache  2022-02: Hyperlipidemia  2019-09: Calculus of gallbladder with acute cholecystitis without   obstruction  2018-04: Coronary artery disease involving native coronary artery of   native heart without angina pectoris  2015-01: Primary open angle glaucoma  2015-01: Senile nuclear sclerosis  2014-04: Obstructive sleep apnea syndrome  2013-08: Lumbar disc disease      History reviewed. No pertinent past medical history.    Social History     Tobacco Use    Smoking status: Former     Types: Cigarettes     Passive exposure: Past    Smokeless tobacco: Never   Substance Use Topics    Alcohol use: Not Currently       Review of systems  negative except listed in HPI    Vitals:    09/11/23 1349   BP: (!) 140/85   BP Location: Right arm   Patient Position: Sitting   Cuff Size: Adult Large   Pulse: 70   Resp: 20   Temp: 98.5  F (36.9  C)   TempSrc: Oral   SpO2: 98%   Weight: 87.2 kg (192 lb 4.8 oz)   Height: 1.702 m (5' 7\")       Physical Exam  Vitals noted and within normal limits except for elevated blood pressure today  General patient is alert, oriented, and in no acute distress  He walks with a cane  There is tenderness to palpation at the left side of the lower thoracic/upper lumbar spine.  There is no tenderness to palpation of the thoracic or lower lumbar spine.  No pain in the gluteal area.  No pain of the pelvis or of the left hip  Lumbar x-ray: No fractures  Results for orders placed or performed in visit on 09/11/23   XR Lumbar Spine 2/3 Views     Status: None    Narrative    EXAM: XR LUMBAR SPINE 2/3 VIEWS  LOCATION: [facility]  DATE: 9/11/2023    INDICATION: Acute " left-sided low back pain without sciatica.  COMPARISON: None.      Impression    IMPRESSION: Five lumbar-type vertebral bodies. No acute fracture or compression deformity. Straightening of the normal lordosis with 1-2 mm likely degenerative grade 1 anterolisthesis at L3-L4. Advanced interbody degenerative change at L5-S1, moderate at   L1-L2 and L3-L5. Multilevel facet arthrosis, worst and moderate at L4-S1. 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 Scattered vascular calcifications within the abdomen. Surgical clips projecting over the right upper abdominal quadrant.         Answers submitted by the patient for this visit:  General Questionnaire (Submitted on 9/11/2023)  Chief Complaint: Chronic problems general questions HPI Form  How many servings of fruits and vegetables do you eat daily?: 2-3  On average, how many sweetened beverages do you drink each day (Examples: soda, juice, sweet tea, etc.  Do NOT count diet or artificially sweetened beverages)?: 1  How many minutes a day do you exercise enough to make your heart beat faster?: 9 or less  How many days a week do you exercise enough to make your heart beat faster?: 3 or less  How many days per week do you miss taking your medication?: 0  General Concern (Submitted on 9/11/2023)  Chief Complaint: Chronic problems general questions HPI Form  What is the reason for your visit today?: pain jn kidney area from falling  When did your symptoms begin?: 1-3 days ago

## 2023-09-11 NOTE — LETTER
September 12, 2023      Sheldon Weems   [facility] [id number]        Dear ,    We are writing to inform you of your test results.    Your test results fall within the expected range(s) or remain unchanged from previous results.  Please continue with current treatment plan. Make an appointment for Dr Strong in October.  I hope your back is feeling better each day.    Resulted Orders   Albumin Random Urine Quantitative with Creat Ratio   Result Value Ref Range    Creatinine Urine mg/dL 196.0 mg/dL      Comment:      The reference ranges have not been established in urine creatinine. The results should be integrated into the clinical context for interpretation.    Albumin Urine mg/L 51.9 mg/L      Comment:      The reference ranges have not been established in urine albumin. The results should be integrated into the clinical context for interpretation.    Albumin Urine mg/g Cr 26.48 (H) 0.00 - 17.00 mg/g Cr      Comment:      Microalbuminuria is defined as an albumin:creatinine ratio of 17 to 299 for males and 25 to 299 for females. A ratio of albumin:creatinine of 300 or higher is indicative of overt proteinuria.  Due to biologic variability, positive results should be confirmed by a second, first-morning random or 24-hour timed urine specimen. If there is discrepancy, a third specimen is recommended. When 2 out of 3 results are in the microalbuminuria range, this is evidence for incipient nephropathy and warrants increased efforts at glucose control, blood pressure control, and institution of therapy with an angiotensin-converting-enzyme (ACE) inhibitor (if the patient can tolerate it).     BASIC METABOLIC PANEL   Result Value Ref Range    Sodium 143 136 - 145 mmol/L    Potassium 4.6 3.4 - 5.3 mmol/L    Chloride 109 (H) 98 - 107 mmol/L    Carbon Dioxide (CO2) 24 22 - 29 mmol/L    Anion Gap 10 7 - 15 mmol/L    Urea Nitrogen 22.7 8.0 - 23.0 mg/dL    Creatinine 1.31 (H) 0.67 - 1.17 mg/dL     Calcium 9.6 8.8 - 10.2 mg/dL    Glucose 83 70 - 99 mg/dL    GFR Estimate 55 (L) >60 mL/min/1.73m2   Lipid panel reflex to direct LDL Non-fasting   Result Value Ref Range    Cholesterol 101 <200 mg/dL    Triglycerides 50 <150 mg/dL    Direct Measure HDL 47 >=40 mg/dL    LDL Cholesterol Calculated 44 <=100 mg/dL    Non HDL Cholesterol 54 <130 mg/dL    Narrative    Cholesterol  Desirable:  <200 mg/dL    Triglycerides  Normal:  Less than 150 mg/dL  Borderline High:  150-199 mg/dL  High:  200-499 mg/dL  Very High:  Greater than or equal to 500 mg/dL    Direct Measure HDL  Female:  Greater than or equal to 50 mg/dL   Male:  Greater than or equal to 40 mg/dL    LDL Cholesterol  Desirable:  <100mg/dL  Above Desirable:  100-129 mg/dL   Borderline High:  130-159 mg/dL   High:  160-189 mg/dL   Very High:  >= 190 mg/dL    Non HDL Cholesterol  Desirable:  130 mg/dL  Above Desirable:  130-159 mg/dL  Borderline High:  160-189 mg/dL  High:  190-219 mg/dL  Very High:  Greater than or equal to 220 mg/dL   Hemoglobin   Result Value Ref Range    Hemoglobin 13.7 13.3 - 17.7 g/dL       If you have any questions or concerns, please call the clinic at the number listed above.       Sincerely,      Carlene Avendano MD

## 2023-10-19 ENCOUNTER — OFFICE VISIT (OUTPATIENT)
Dept: FAMILY MEDICINE | Facility: CLINIC | Age: 78
End: 2023-10-19
Payer: COMMERCIAL

## 2023-10-19 VITALS
HEIGHT: 67 IN | DIASTOLIC BLOOD PRESSURE: 80 MMHG | BODY MASS INDEX: 29.66 KG/M2 | OXYGEN SATURATION: 98 % | WEIGHT: 189 LBS | RESPIRATION RATE: 20 BRPM | SYSTOLIC BLOOD PRESSURE: 135 MMHG | HEART RATE: 57 BPM

## 2023-10-19 DIAGNOSIS — I25.5 ISCHEMIC CARDIOMYOPATHY: ICD-10-CM

## 2023-10-19 DIAGNOSIS — Z23 NEED FOR TDAP VACCINATION: ICD-10-CM

## 2023-10-19 DIAGNOSIS — N40.0 BENIGN PROSTATIC HYPERPLASIA, UNSPECIFIED WHETHER LOWER URINARY TRACT SYMPTOMS PRESENT: ICD-10-CM

## 2023-10-19 DIAGNOSIS — Z00.00 ENCOUNTER FOR MEDICARE ANNUAL WELLNESS EXAM: Primary | ICD-10-CM

## 2023-10-19 DIAGNOSIS — Z23 NEED FOR SHINGLES VACCINE: ICD-10-CM

## 2023-10-19 DIAGNOSIS — I10 ESSENTIAL (PRIMARY) HYPERTENSION: ICD-10-CM

## 2023-10-19 DIAGNOSIS — Z29.11 NEED FOR VACCINATION AGAINST RESPIRATORY SYNCYTIAL VIRUS: ICD-10-CM

## 2023-10-19 PROCEDURE — 90677 PCV20 VACCINE IM: CPT | Performed by: FAMILY MEDICINE

## 2023-10-19 PROCEDURE — 90662 IIV NO PRSV INCREASED AG IM: CPT | Performed by: FAMILY MEDICINE

## 2023-10-19 PROCEDURE — G0008 ADMIN INFLUENZA VIRUS VAC: HCPCS | Performed by: FAMILY MEDICINE

## 2023-10-19 PROCEDURE — G0439 PPPS, SUBSEQ VISIT: HCPCS | Performed by: FAMILY MEDICINE

## 2023-10-19 PROCEDURE — G0009 ADMIN PNEUMOCOCCAL VACCINE: HCPCS | Performed by: FAMILY MEDICINE

## 2023-10-19 RX ORDER — FINASTERIDE 5 MG/1
5 TABLET, FILM COATED ORAL DAILY
Qty: 90 TABLET | Refills: 1 | Status: SHIPPED | OUTPATIENT
Start: 2023-10-19 | End: 2024-04-08

## 2023-10-19 RX ORDER — RESPIRATORY SYNCYTIAL VIRUS VACCINE 120MCG/0.5
0.5 KIT INTRAMUSCULAR ONCE
Qty: 1 EACH | Refills: 0 | Status: SHIPPED | OUTPATIENT
Start: 2023-10-19 | End: 2023-10-19

## 2023-10-19 RX ORDER — ATORVASTATIN CALCIUM 40 MG/1
40 TABLET, FILM COATED ORAL DAILY
Qty: 90 TABLET | Refills: 1 | Status: SHIPPED | OUTPATIENT
Start: 2023-10-19 | End: 2024-06-17

## 2023-10-19 RX ORDER — LISINOPRIL 20 MG/1
TABLET ORAL
Qty: 180 TABLET | Refills: 1 | Status: SHIPPED | OUTPATIENT
Start: 2023-10-19 | End: 2024-07-09

## 2023-10-19 ASSESSMENT — ENCOUNTER SYMPTOMS
WEAKNESS: 0
ABDOMINAL PAIN: 0
ARTHRALGIAS: 0
JOINT SWELLING: 0
FREQUENCY: 1
HEADACHES: 0
MYALGIAS: 0
PARESTHESIAS: 0
HEMATOCHEZIA: 0
COUGH: 0
DIZZINESS: 1
DYSURIA: 0
EYE PAIN: 0
SORE THROAT: 0
CONSTIPATION: 0
CHILLS: 0
SHORTNESS OF BREATH: 0
NERVOUS/ANXIOUS: 0
PALPITATIONS: 0
NAUSEA: 0
DIARRHEA: 0
HEMATURIA: 0

## 2023-10-19 ASSESSMENT — ACTIVITIES OF DAILY LIVING (ADL): CURRENT_FUNCTION: NO ASSISTANCE NEEDED

## 2023-10-19 NOTE — PROGRESS NOTES
The patient was counseled and encouraged to consider modifying their diet and eating habits. He was provided with information on recommended healthy diet options.  The patient was provided with written information regarding signs of hearing loss.  Information on urinary incontinence and treatment options given to patient.  He is at risk for falling and has been provided with information to reduce the risk of falling at home.

## 2023-10-19 NOTE — PROGRESS NOTES
"SUBJECTIVE:   Sheldon is a 78 year old who presents for Preventive Visit.      10/19/2023     2:17 PM   Additional Questions   Roomed by melissa   Accompanied by n/a       Are you in the first 12 months of your Medicare coverage?  No    Healthy Habits:     In general, how would you rate your overall health?  Good    Frequency of exercise:  6-7 days/week    Duration of exercise:  15-30 minutes    Do you usually eat at least 4 servings of fruit and vegetables a day, include whole grains    & fiber and avoid regularly eating high fat or \"junk\" foods?  No    Taking medications regularly:  Yes    Barriers to taking medications:  None    Medication side effects:  Not applicable    Ability to successfully perform activities of daily living:  No assistance needed    Home Safety:  Lack of grab bars in the bathroom    Hearing Impairment:  Difficulty following a conversation in a noisy restaurant or crowded room, feel that people are mumbling or not speaking clearly, difficulty following dialogue in the theater, difficult to understand a speaker at a public meeting or Denominational service, need to ask people to speak up or repeat themselves, difficulty understanding soft or whispered speech and difficulty understanding speech on the telephone    In the past 6 months, have you been bothered by leaking of urine? Yes    In general, how would you rate your overall mental or emotional health?  Excellent    Additional concerns today:  Yes      Today's PHQ-2 Score:       10/19/2023     2:12 PM   PHQ-2 ( 1999 [employer])   Q1: Little interest or pleasure in doing things 0   Q2: Feeling down, depressed or hopeless 0   PHQ-2 Score 0   Q1: Little interest or pleasure in doing things Not at all   Q2: Feeling down, depressed or hopeless Not at all   PHQ-2 Score 0           Have you ever done Advance Care Planning? (For example, a Health Directive, POLST, or a discussion with a medical provider or your loved ones about your wishes): Yes, patient " [facility] [place] has an Advance Care Planning document and will bring a copy to the clinic.    Bilateral hearing Aids   Fall risk  Fallen 2 or more times in the past year?: Yes  Any fall with injury in the past year?: Yes    Cognitive Screening   1) Repeat 3 items (Leader, Season, Table)    2) Clock draw: NORMAL  3) 3 item recall: Recalls 2 objects   Results: NORMAL clock, 1-2 items recalled: COGNITIVE IMPAIRMENT LESS LIKELY    Mini-CogTM Copyright JOSE Dominguez. Licensed by the author for use in [facility]; reprinted with permission ([email or web address]). All rights reserved.      Do you have sleep apnea, excessive snoring or daytime drowsiness? : yes    Reviewed and updated as needed this visit by clinical staff   Tobacco  Allergies  Meds              Reviewed and updated as needed this visit by Provider                 Social History     Tobacco Use    Smoking status: Former     Types: Pipe     Quit date: 1990     Years since quittin.8     Passive exposure: Past    Smokeless tobacco: Never   Substance Use Topics    Alcohol use: Not Currently             10/19/2023     2:11 PM   Alcohol Use   Prescreen: >3 drinks/day or >7 drinks/week? Not Applicable     Do you have a current opioid prescription? No  Do you use any other controlled substances or medications that are not prescribed by a provider? None      Current providers sharing in care for this patient include:   Patient Care Team:  Mike Strong MD as PCP - General (Family Medicine)  Mike Strong MD as Assigned PCP    The following health maintenance items are reviewed in Epic and correct as of today:  Health Maintenance   Topic Date Due    ADVANCE CARE PLANNING  Never done    RSV VACCINE 60+ (1 - 1-dose 60+ series) Never done    DTAP/TDAP/TD IMMUNIZATION (2 - Td or Tdap) 2015    Pneumococcal Vaccine: 65+ Years (2 - PPSV23 or PCV20) 10/13/2015    ZOSTER IMMUNIZATION (2 of 3) 2015    MEDICARE ANNUAL WELLNESS VISIT  2021  "   INFLUENZA VACCINE (1) 09/01/2023    COVID-19 Vaccine (6 - 2023-24 season) 09/01/2023    BMP  09/11/2024    LIPID  09/11/2024    MICROALBUMIN  09/11/2024    ANNUAL REVIEW OF HM ORDERS  09/11/2024    HEMOGLOBIN  09/11/2024    FALL RISK ASSESSMENT  10/19/2024    PHQ-2 (once per calendar year)  Completed    URINALYSIS  Completed    IPV IMMUNIZATION  Aged Out    HPV IMMUNIZATION  Aged Out    MENINGITIS IMMUNIZATION  Aged Out    COLORECTAL CANCER SCREENING  Discontinued         Review of Systems   Constitutional:  Negative for chills.   HENT:  Positive for hearing loss. Negative for congestion, ear pain and sore throat.    Eyes:  Negative for pain and visual disturbance.   Respiratory:  Negative for cough and shortness of breath.    Cardiovascular:  Negative for chest pain, palpitations and peripheral edema.   Gastrointestinal:  Negative for abdominal pain, constipation, diarrhea, hematochezia and nausea.   Genitourinary:  Positive for frequency, impotence and urgency. Negative for dysuria, genital sores, hematuria and penile discharge.   Musculoskeletal:  Negative for arthralgias, joint swelling and myalgias.   Skin:  Negative for rash.   Neurological:  Positive for dizziness. Negative for weakness, headaches and paresthesias.   Psychiatric/Behavioral:  Negative for mood changes. The patient is not nervous/anxious.          OBJECTIVE:   BP (!) 148/86 (BP Location: Right arm, Patient Position: Sitting)   Pulse 57   Resp 20   Ht 1.702 m (5' 7\")   Wt 85.7 kg (189 lb)   SpO2 98%   BMI 29.60 kg/m   Estimated body mass index is 29.6 kg/m  as calculated from the following:    Height as of this encounter: 1.702 m (5' 7\").    Weight as of this encounter: 85.7 kg (189 lb).  Physical Exam  GENERAL: healthy, alert and no distress  NECK: no adenopathy, no asymmetry, masses, or scars and thyroid normal to palpation  RESP: lungs clear to auscultation - no rales, rhonchi or wheezes  CV: regular rate and rhythm, normal S1 S2, no " "S3 or S4, no murmur, click or rub, no peripheral edema and peripheral pulses strong  ABDOMEN: soft, nontender, no hepatosplenomegaly, no masses and bowel sounds normal  MS: no gross musculoskeletal defects noted, no edema        ASSESSMENT / PLAN:       ICD-10-CM    1. Encounter for Medicare annual wellness exam  Z00.00       2. Need for shingles vaccine  Z23 zoster vaccine recombinant adjuvanted (SHINGRIX) injection      3. Need for Tdap vaccination  Z23 Tdap, tetanus-diptheria-acell pertussis, (BOOSTRIX) 5-2.5-18.5 LF-MCG/0.5 JIGNESH injection      4. Need for vaccination against respiratory syncytial virus  Z29.11 respiratory syncytial virus vaccine, bivalent (ABRYSVO) injection      5. Benign prostatic hyperplasia, unspecified whether lower urinary tract symptoms present  N40.0 finasteride (PROSCAR) 5 MG tablet      6. Ischemic cardiomyopathy  I25.5 atorvastatin (LIPITOR) 40 MG tablet      7. Essential (primary) hypertension  I10 atorvastatin (LIPITOR) 40 MG tablet     lisinopril (ZESTRIL) 20 MG tablet          Patient has been advised of split billing requirements and indicates understanding: Yes      COUNSELING:  Reviewed preventive health counseling, as reflected in patient instructions       Regular exercise       Healthy diet/nutrition       Vision screening       Hearing screening      BMI:   Estimated body mass index is 29.6 kg/m  as calculated from the following:    Height as of this encounter: 1.702 m (5' 7\").    Weight as of this encounter: 85.7 kg (189 lb).         He reports that he quit smoking about 33 years ago. His smoking use included pipe. He has been exposed to tobacco smoke. He has never used smokeless tobacco.      Appropriate preventive services were discussed with this patient, including applicable screening as appropriate for fall prevention, nutrition, physical activity, Tobacco-use cessation, weight loss and cognition.  Checklist reviewing preventive services available has been given to " the patient.    Reviewed patients plan of care and provided an AVS. The Basic Care Plan (routine screening as documented in Health Maintenance) for Sheldon meets the Care Plan requirement. This Care Plan has been established and reviewed with the Patient and spouse.        Mike Strong MD  [facility]    Identified Health Risks:  I have reviewed Opioid Use Disorder and Substance Use Disorder risk factors and made any needed referrals.

## 2023-10-19 NOTE — PATIENT INSTRUCTIONS
Patient Education   Personalized Prevention Plan  You are due for the preventive services outlined below.  Your care team is available to assist you in scheduling these services.  If you have already completed any of these items, please share that information with your care team to update in your medical record.  Health Maintenance Due   Topic Date Due     RSV VACCINE 60+ (1 - 1-dose 60+ series) Never done     Diptheria Tetanus Pertussis (DTAP/TDAP/TD) Vaccine (2 - Td or Tdap) 05/06/2015     Pneumococcal Vaccine (2 - PPSV23 or PCV20) 10/13/2015     Zoster (Shingles) Vaccine (2 of 3) 11/23/2015     Annual Wellness Visit  08/28/2021     Flu Vaccine (1) 09/01/2023     COVID-19 Vaccine (6 - 2023-24 season) 09/01/2023     Learning About Dietary Guidelines  What are the Dietary Guidelines for Americans?     Dietary Guidelines for Americans provide tips for eating well and staying healthy. This helps reduce the risk for long-term (chronic) diseases.  These guidelines recommend that you:  Eat and drink the right amount for you. The U.S. government's food guide is called MyPlate. It can help you make your own well-balanced eating plan.  Try to balance your eating with your activity. This helps you stay at a healthy weight.  Drink alcohol in moderation, if at all.  Limit foods high in salt, saturated fat, trans fat, and added sugar.  These guidelines are from the U.S. Department of Agriculture and the U.S. Department of Health and Human Services. They are updated every 5 years.  What is MyPlate?  MyPlate is the U.S. government's food guide. It can help you make your own well-balanced eating plan. A balanced eating plan means that you eat enough, but not too much, and that your food gives you the nutrients you need to stay healthy.  MyPlate focuses on eating plenty of whole grains, fruits, and vegetables, and on limiting fat and sugar. It is available online at www.ChooseMyPlate.gov.  How can you get started?  If you're trying  "to eat healthier, you can slowly change your eating habits over time. You don't have to make big changes all at once. Start by adding one or two healthy foods to your meals each day.  Grains  Choose whole-grain breads and cereals and whole-wheat pasta and whole-grain crackers.  Vegetables  Eat a variety of vegetables every day. They have lots of nutrients and are part of a heart-healthy diet.  Fruits  Eat a variety of fruits every day. Fruits contain lots of nutrients. Choose fresh fruit instead of fruit juice.  Protein foods  Choose fish and lean poultry more often. Eat red meat and fried meats less often. Dried beans, tofu, and nuts are also good sources of protein.  Dairy  Choose low-fat or fat-free products from this food group. If you have problems digesting milk, try eating cheese or yogurt instead.  Fats and oils  Limit fats and oils if you're trying to cut calories. Choose healthy fats when you cook. These include canola oil and olive oil.  Where can you learn more?  Go to https://www.[employer].net/patiented  Enter D676 in the search box to learn more about \"Learning About Dietary Guidelines.\"  Current as of: March 1, 2023               Content Version: 13.7    3851-8793 [employer].   Care instructions adapted under license by your healthcare professional. If you have questions about a medical condition or this instruction, always ask your healthcare professional. [employer] disclaims any warranty or liability for your use of this information.      Hearing Loss: Care Instructions  Overview     Hearing loss is a sudden or slow decrease in how well you hear. It can range from slight to profound. Permanent hearing loss can occur with aging. It also can happen when you are exposed long-term to loud noise. Examples include listening to loud music, riding motorcycles, or being around other loud machines.  Hearing loss can affect your work and home life. It can make you feel lonely or " depressed. You may feel that you have lost your independence. But hearing aids and other devices can help you hear better and feel connected to others.  Follow-up care is a key part of your treatment and safety. Be sure to make and go to all appointments, and call your doctor if you are having problems. It's also a good idea to know your test results and keep a list of the medicines you take.  How can you care for yourself at home?  Avoid loud noises whenever possible. This helps keep your hearing from getting worse.  Always wear hearing protection around loud noises.  Wear a hearing aid as directed.  A professional can help you pick a hearing aid that will work best for you.  You can also get hearing aids over the counter for mild to moderate hearing loss.  Have hearing tests as your doctor suggests. They can show whether your hearing has changed. Your hearing aid may need to be adjusted.  Use other devices as needed. These may include:  Telephone amplifiers and hearing aids that can connect to a television, stereo, radio, or microphone.  Devices that use lights or vibrations. These alert you to the doorbell, a ringing telephone, or a baby monitor.  Television closed-captioning. This shows the words at the bottom of the screen. Most new TVs can do this.  TTY (text telephone). This lets you type messages back and forth on the telephone instead of talking or listening. These devices are also called TDD. When messages are typed on the keyboard, they are sent over the phone line to a receiving TTY. The message is shown on a monitor.  Use text messaging, social media, and email if it is hard for you to communicate by telephone.  Try to learn a listening technique called speechreading. It is not lipreading. You pay attention to people's gestures, expressions, posture, and tone of voice. These clues can help you understand what a person is saying. Face the person you are talking to, and have them face you. Make sure the  "lighting is good. You need to see the other person's face clearly.  Think about counseling if you need help to adjust to your hearing loss.  When should you call for help?  Watch closely for changes in your health, and be sure to contact your doctor if:    You think your hearing is getting worse.     You have new symptoms, such as dizziness or nausea.   Where can you learn more?  Go to https://www.[employer].net/patiented  Enter R798 in the search box to learn more about \"Hearing Loss: Care Instructions.\"  Current as of: March 1, 2023               Content Version: 13.7    2780-6265 [employer]   Care instructions adapted under license by your healthcare professional. If you have questions about a medical condition or this instruction, always ask your healthcare professional. [employer] disclaims any warranty or liability for your use of this information.      Bladder Training: Care Instructions  Your Care Instructions     Bladder training is used to treat urge incontinence and stress incontinence. Urge incontinence means that the need to urinate comes on so fast that you can't get to a toilet in time. Stress incontinence means that you leak urine because of pressure on your bladder. For example, it may happen when you laugh, cough, or lift something heavy.  Bladder training can increase how long you can wait before you have to urinate. It can also help your bladder hold more urine. And it can give you better control over the urge to urinate.  It is important to remember that bladder training takes a few weeks to a few months to make a difference. You may not see results right away, but don't give up.  Follow-up care is a key part of your treatment and safety. Be sure to make and go to all appointments, and call your doctor if you are having problems. It's also a good idea to know your test results and keep a list of the medicines you take.  How can you care for yourself at home?  Work " with your doctor to come up with a bladder training program that is right for you. You may use one or more of the following methods.  Delayed urination  In the beginning, try to keep from urinating for 5 minutes after you first feel the need to go.  While you wait, take deep, slow breaths to relax. Kegel exercises can also help you delay the need to go to the bathroom.  After some practice, when you can easily wait 5 minutes to urinate, try to wait 10 minutes before you urinate.  Slowly increase the waiting period until you are able to control when you have to urinate.  Scheduled urination  Empty your bladder when you first wake up in the morning.  Schedule times throughout the day when you will urinate.  Start by going to the bathroom every hour, even if you don't need to go.  Slowly increase the time between trips to the bathroom.  When you have found a schedule that works well for you, keep doing it.  If you wake up during the night and have to urinate, do it. Apply your schedule to waking hours only.  Kegel exercises  These tighten and strengthen pelvic muscles, which can help you control the flow of urine. (If doing these exercises causes pain, stop doing them and talk with your doctor.) To do Kegel exercises:  Squeeze your muscles as if you were trying not to pass gas. Or squeeze your muscles as if you were stopping the flow of urine. Your belly, legs, and buttocks shouldn't move.  Hold the squeeze for 3 seconds, then relax for 5 to 10 seconds.  Start with 3 seconds, then add 1 second each week until you are able to squeeze for 10 seconds.  Repeat the exercise 10 times a session. Do 3 to 8 sessions a day.  When should you call for help?  Watch closely for changes in your health, and be sure to contact your doctor if:    Your incontinence is getting worse.     You do not get better as expected.   Where can you learn more?  Go to https://www.healthwise.net/patiented  Enter V684 in the search box to learn more  "about \"Bladder Training: Care Instructions.\"  Current as of: March 1, 2023               Content Version: 13.7    8927-2952 [employer].   Care instructions adapted under license by your healthcare professional. If you have questions about a medical condition or this instruction, always ask your healthcare professional. [employer] disclaims any warranty or liability for your use of this information.      Preventing Falls: Care Instructions    Talk to your doctor about the medicines you take. Ask if any of them increase the risk of falls and whether they can be changed or stopped.   Try to exercise regularly. It can help improve your strength and balance. This can help lower your risk of falling.     Practice fall safety and prevention.    Wear low-heeled shoes that fit well and give your feet good support. Talk to your doctor if you have foot problems that make this hard.  Carry a cellphone or wear a medical alert device that you can use to call for help.  Use stepladders instead of chairs to reach high objects. Don't climb if you're at risk for falls. Ask for help, if needed.  Wear the correct eyeglasses, if you need them.    Make your home safer.    Remove rugs, cords, clutter, and furniture from walkways.  Keep your house well lit. Use night-lights in hallways and bathrooms.  Install and use sturdy handrails on stairways.  Wear nonskid footwear, even inside. Don't walk barefoot or in socks without shoes.    Be safe outside.    Use handrails, curb cuts, and ramps whenever possible.  Keep your hands free by using a shoulder bag or backpack.  Try to walk in well-lit areas. Watch out for uneven ground, changes in pavement, and debris.  Be careful in the winter. Walk on the grass or gravel when sidewalks are slippery. Use de-icer on steps and walkways. Add non-slip devices to shoes.    Put grab bars and nonskid mats in your shower or tub and near the toilet. Try to use a shower chair or bath " "bench when bathing.   Get into a tub or shower by putting in your weaker leg first. Get out with your strong side first. Have a phone or medical alert device in the bathroom with you.   Where can you learn more?  Go to https://www.[employer].net/patiented  Enter G117 in the search box to learn more about \"Preventing Falls: Care Instructions.\"  Current as of: November 9, 2022               Content Version: 13.7    8787-6949 [employer].   Care instructions adapted under license by your healthcare professional. If you have questions about a medical condition or this instruction, always ask your healthcare professional. [employer] disclaims any warranty or liability for your use of this information.      How to Get Up Safely After a Fall: Care Instructions  Overview     If you have injuries, health problems, or other reasons that may make it easy for you to fall at home, it is a good idea to learn how to get up safely after a fall. Learning how to get up correctly can help you avoid making an injury worse.  Also, knowing what to do if you cannot get up can help you stay safe until help arrives.  Follow-up care is a key part of your treatment and safety. Be sure to make and go to all appointments, and call your doctor if you are having problems. It's also a good idea to know your test results and keep a list of the medicines you take.  How can you care for yourself after a fall?  If you think you can get up  First lie still for a few minutes and think about how you feel. If your body feels okay and you think you can get up safely, follow the rest of the steps below:  Look for a chair or other piece of furniture that is close to you.  Roll onto your side and rest. Roll by turning your head in the direction you want to roll, move your shoulder and arm, then hip and leg in the same direction.  Lie still for a moment to let your blood pressure adjust.  Slowly push your upper body up, lift your " "head, and take a moment to rest.  Slowly get up on your hands and knees, and crawl to the chair or other stable piece of furniture.  Put your hands on the chair.  Move one foot forward, and place it flat on the floor. Your other leg should be bent with the knee on the floor.  Rise slowly, turn your body, and sit in the chair. Stay seated for a bit and think about how you feel. Call for help. Even if you feel okay, let someone know what happened to you. You might not know that you have a serious injury.  If you cannot get up  If you think you are injured after a fall or you cannot get up, try not to panic.  Call out for help.  If you have a phone within reach or you have an emergency call device, use it to call for help.  If you do not have a phone within reach, try to slide yourself toward it. If you cannot get to the phone, try to slide toward a door or window or a place where you think you can be heard.  [place] or use an object to make noise so someone might hear you.  If you can reach something that you can use for a pillow, place it under your head. Try to stay warm by covering yourself with a blanket or clothing while you wait for help.  When should you call for help?   Call 911 anytime you think you may need emergency care. For example, call if:    You passed out (lost consciousness).     You cannot get up after a fall.     You have severe pain.   Call your doctor now or seek immediate medical care if:    You have new or worse pain.     You are dizzy or lightheaded.     You hit your head.   Watch closely for changes in your health, and be sure to contact your doctor if:    You do not get better as expected.   Where can you learn more?  Go to https://www.[employer].net/patiented  Enter G513 in the search box to learn more about \"How to Get Up Safely After a Fall: Care Instructions.\"  Current as of: November 14, 2022               Content Version: 13.7    0234-2070 [employer].   Care instructions " adapted under license by your healthcare professional. If you have questions about a medical condition or this instruction, always ask your healthcare professional. [employer] disclaims any warranty or liability for your use of this information.

## 2023-11-21 DIAGNOSIS — I25.5 ISCHEMIC CARDIOMYOPATHY: ICD-10-CM

## 2023-11-21 DIAGNOSIS — I10 ESSENTIAL (PRIMARY) HYPERTENSION: ICD-10-CM

## 2023-11-22 RX ORDER — METOPROLOL SUCCINATE 100 MG/1
100 TABLET, EXTENDED RELEASE ORAL DAILY
Qty: 90 TABLET | Refills: 2 | Status: SHIPPED | OUTPATIENT
Start: 2023-11-22 | End: 2024-08-27

## 2023-12-05 ENCOUNTER — IMMUNIZATION (OUTPATIENT)
Dept: FAMILY MEDICINE | Facility: CLINIC | Age: 78
End: 2023-12-05
Payer: COMMERCIAL

## 2023-12-05 DIAGNOSIS — Z23 NEED FOR VACCINATION: Primary | ICD-10-CM

## 2023-12-05 PROCEDURE — 99207 PR NO CHARGE NURSE ONLY: CPT

## 2023-12-05 PROCEDURE — 91320 SARSCV2 VAC 30MCG TRS-SUC IM: CPT

## 2023-12-05 PROCEDURE — 90480 ADMN SARSCOV2 VAC 1/ONLY CMP: CPT

## 2023-12-05 NOTE — PROGRESS NOTES
Prior to immunization administration, verified patients identity using patient s name and date of birth. Please see Immunization Activity for additional information.     Screening Questionnaire for Adult Immunization    Are you sick today?   No   Do you have allergies to medications, food, a vaccine component or latex?   No   Have you ever had a serious reaction after receiving a vaccination?   No   Do you have a long-term health problem with heart, lung, kidney, or metabolic disease (e.g., diabetes), asthma, a blood disorder, no spleen, complement component deficiency, a cochlear implant, or a spinal fluid leak?  Are you on long-term aspirin therapy?   No   Do you have cancer, leukemia, HIV/AIDS, or any other immune system problem?   No   Do you have a parent, brother, or sister with an immune system problem?   No   In the past 3 months, have you taken medications that affect  your immune system, such as prednisone, other steroids, or anticancer drugs; drugs for the treatment of rheumatoid arthritis, Crohn s disease, or psoriasis; or have you had radiation treatments?   No   Have you had a seizure, or a brain or other nervous system problem?   No   During the past year, have you received a transfusion of blood or blood    products, or been given immune (gamma) globulin or antiviral drug?   No   For women: Are you pregnant or is there a chance you could become       pregnant during the next month?   No   Have you received any vaccinations in the past 4 weeks?   No     Immunization questionnaire answers were all negative.    I have reviewed the following standing orders:   This patient is due and qualifies for the Covid-19 vaccine.     Click here for COVID-19 Standing Order    I have reviewed the vaccines inclusion and exclusion criteria; No concerns regarding eligibility.     Patient instructed to remain in clinic for 15 minutes afterwards, and to report any adverse reactions.     Screening performed by Zonia Wilde  LPN on 12/5/2023 at 10:26 AM.

## 2023-12-16 DIAGNOSIS — I10 ESSENTIAL (PRIMARY) HYPERTENSION: ICD-10-CM

## 2023-12-16 DIAGNOSIS — I25.5 ISCHEMIC CARDIOMYOPATHY: ICD-10-CM

## 2023-12-18 RX ORDER — ATORVASTATIN CALCIUM 40 MG/1
40 TABLET, FILM COATED ORAL DAILY
Qty: 90 TABLET | Refills: 1 | OUTPATIENT
Start: 2023-12-18

## 2024-01-17 ENCOUNTER — TELEPHONE (OUTPATIENT)
Dept: FAMILY MEDICINE | Facility: CLINIC | Age: 79
End: 2024-01-17
Payer: COMMERCIAL

## 2024-01-17 DIAGNOSIS — G47.33 OBSTRUCTIVE SLEEP APNEA SYNDROME: Primary | ICD-10-CM

## 2024-01-17 NOTE — TELEPHONE ENCOUNTER
1-17-24    Order/Referral Request    Who is requesting: maribell mack     Orders being requested: DME CPAP machine    Reason service is needed/diagnosis: as 1-15 pt's current machine is a humidifier CPAP, as of 1-15 it stopped blowing moisture & in order to get a new machine [facility] needs a DME order      When are orders needed by: soon    Does patient have a preference on a Group/Provider/Facility? [facility]    Does patient have an appointment scheduled?: No    Where to send orders: Fax [phone]    Okay to leave a detailed message?: Yes at Home number on file [phone] (home)

## 2024-01-22 NOTE — TELEPHONE ENCOUNTER
Luis Carlos called to see if CPAP order was placed and faxed.      Order has been placed by Dr. Strong; Luis Carlos states pt will need F2F.  They will reach out to patient to inform an appt is needed with Dr. Ham.

## 2024-01-23 ENCOUNTER — TELEPHONE (OUTPATIENT)
Dept: FAMILY MEDICINE | Facility: CLINIC | Age: 79
End: 2024-01-23
Payer: COMMERCIAL

## 2024-01-23 NOTE — TELEPHONE ENCOUNTER
Routing request to provider. Patient would like Lung cancer screening. Last OV 10/19 with Dr. Strong. Please advise.

## 2024-01-23 NOTE — TELEPHONE ENCOUNTER
General Call    Reason for Call: Patient had some questions for Dr. Strong about how to move forward with getting some Lung Cancer screening done.    Date of last appointment with provider: 10/19/23    Okay to leave a detailed message?: Yes at Cell number on file:    Telephone Information:   Mobile [phone]

## 2024-01-23 NOTE — TELEPHONE ENCOUNTER
01/23/24  Pt called to see if he could schedule lung cancer screening. Writer relayed that we received his message from earlier and are connecting with his provider for next steps. Pt would like a call back on the home phone, ok to leave a message.  Rebeac

## 2024-01-25 ENCOUNTER — OFFICE VISIT (OUTPATIENT)
Dept: FAMILY MEDICINE | Facility: CLINIC | Age: 79
End: 2024-01-25
Payer: COMMERCIAL

## 2024-01-25 VITALS
HEART RATE: 60 BPM | BODY MASS INDEX: 30.29 KG/M2 | SYSTOLIC BLOOD PRESSURE: 170 MMHG | TEMPERATURE: 97.3 F | DIASTOLIC BLOOD PRESSURE: 78 MMHG | RESPIRATION RATE: 20 BRPM | WEIGHT: 193 LBS | OXYGEN SATURATION: 100 % | HEIGHT: 67 IN

## 2024-01-25 DIAGNOSIS — G47.33 OBSTRUCTIVE SLEEP APNEA SYNDROME: Primary | ICD-10-CM

## 2024-01-25 PROCEDURE — 99213 OFFICE O/P EST LOW 20 MIN: CPT | Performed by: INTERNAL MEDICINE

## 2024-01-25 RX ORDER — KETOROLAC TROMETHAMINE 5 MG/ML
SOLUTION OPHTHALMIC
COMMUNITY
Start: 2024-01-05

## 2024-01-25 ASSESSMENT — ENCOUNTER SYMPTOMS
SHORTNESS OF BREATH: 0
COUGH: 0
FATIGUE: 0
ROS GI COMMENTS: NO HEARTBURN
HEADACHES: 0
PALPITATIONS: 0
UNEXPECTED WEIGHT CHANGE: 0
SLEEP DISTURBANCE: 0

## 2024-01-25 NOTE — PROGRESS NOTES
"  Assessment & Plan   Problem List Items Addressed This Visit       Obstructive sleep apnea syndrome - Primary     -4/30/2014 polysomnogram revealing severe obstructive sleep apnea with an AHI of 81 and oximetry erica of 64%  -CPAP titration was optimal to 7 cm of water pressure on 5/16/2018  -Currently on APAP 5-15  -Patient is compliant with CPAP and benefiting with decreased daytime sleepiness.  -Requesting a replacement device and visit 1/25/24         Relevant Orders    CPAP Order for DME - ONLY FOR DME (Completed)    PRIMARY CARE FOLLOW-UP SCHEDULING               CPAP follow-up in 3      Subjective   Sheldon is a 78 year old, presenting for the following health issues:  Sleep Apnea (Pt here for an CORBY/CPAP Follow-up.  Pt states he has been nable to use his CPAP because its \"not blowing moisture\")      1/25/2024     1:02 PM   Additional Questions   Roomed by Marcial CARDENAS     History of Present Illness       Reason for visit:  Sleep apnea    He eats 2-3 servings of fruits and vegetables daily.He consumes 0 sweetened beverage(s) daily.He exercises with enough effort to increase his heart rate 9 or less minutes per day.  He exercises with enough effort to increase his heart rate 3 or less days per week.   He is taking medications regularly.     She has been on CPAP since 2014 and is very happy with it.  No daytime sleepiness.  He uses his CPAP every night.  He would like a replacement device as he is still using his original and it has been recalled.            Review of Systems   Constitutional:  Negative for fatigue and unexpected weight change.   Respiratory:  Negative for cough and shortness of breath.    Cardiovascular:  Negative for chest pain, palpitations and leg swelling.   Gastrointestinal:         No heartburn   Genitourinary:         Nocturia 3 times per night due to BPH   Neurological:  Negative for headaches.   Psychiatric/Behavioral:  Negative for sleep disturbance.            Objective    BP (!) " "170/78 (BP Location: Right arm, Patient Position: Sitting, Cuff Size: Adult Large)   Pulse 60   Temp 97.3  F (36.3  C) (Tympanic)   Resp 20   Ht 1.702 m (5' 7\")   Wt 87.5 kg (193 lb)   SpO2 100%   BMI 30.23 kg/m    Body mass index is 30.23 kg/m .  Physical Exam   Healthy-appearing older man in no distress  HEENT exam unremarkable.  Mallampati 1  Neck supple no adenopathy or thyromegaly  No edema  Alert, oriented, speech fluent, memory good, cranial nerves normal  Normal mood            Signed Electronically by: Jose Ham MD    "

## 2024-01-25 NOTE — PROGRESS NOTES
Pt CPAP supply order and todays visit note were faxed to [facility] per pt request f-[phone]  Marcial Garcia CMA

## 2024-01-25 NOTE — ASSESSMENT & PLAN NOTE
-4/30/2014 polysomnogram revealing severe obstructive sleep apnea with an AHI of 81 and oximetry erica of 64%  -CPAP titration was optimal to 7 cm of water pressure on 5/16/2018  -Currently on APAP 5-15  -Patient is compliant with CPAP and benefiting with decreased daytime sleepiness.  -Requesting a replacement device and visit 1/25/24

## 2024-03-29 ENCOUNTER — OFFICE VISIT (OUTPATIENT)
Dept: FAMILY MEDICINE | Facility: CLINIC | Age: 79
End: 2024-03-29
Payer: COMMERCIAL

## 2024-03-29 VITALS
RESPIRATION RATE: 20 BRPM | SYSTOLIC BLOOD PRESSURE: 130 MMHG | BODY MASS INDEX: 30.49 KG/M2 | HEART RATE: 62 BPM | HEIGHT: 67 IN | WEIGHT: 194.3 LBS | DIASTOLIC BLOOD PRESSURE: 78 MMHG | TEMPERATURE: 100.8 F

## 2024-03-29 DIAGNOSIS — N18.31 STAGE 3A CHRONIC KIDNEY DISEASE (H): ICD-10-CM

## 2024-03-29 DIAGNOSIS — H61.23 BILATERAL IMPACTED CERUMEN: ICD-10-CM

## 2024-03-29 DIAGNOSIS — J10.1 INFLUENZA B: Primary | ICD-10-CM

## 2024-03-29 DIAGNOSIS — R50.9 FEVER, UNSPECIFIED FEVER CAUSE: ICD-10-CM

## 2024-03-29 LAB
ALBUMIN UR-MCNC: 30 MG/DL
APPEARANCE UR: CLEAR
BACTERIA #/AREA URNS HPF: ABNORMAL /HPF
BILIRUB UR QL STRIP: ABNORMAL
COLOR UR AUTO: ABNORMAL
FLUAV AG SPEC QL IA: NEGATIVE
FLUBV AG SPEC QL IA: POSITIVE
GLUCOSE UR STRIP-MCNC: NEGATIVE MG/DL
HGB UR QL STRIP: ABNORMAL
KETONES UR STRIP-MCNC: 15 MG/DL
LEUKOCYTE ESTERASE UR QL STRIP: NEGATIVE
NITRATE UR QL: NEGATIVE
PH UR STRIP: 6 [PH] (ref 5–7)
RBC #/AREA URNS AUTO: ABNORMAL /HPF
SP GR UR STRIP: 1.02 (ref 1–1.03)
SQUAMOUS #/AREA URNS AUTO: ABNORMAL /LPF
UROBILINOGEN UR STRIP-ACNC: 1 E.U./DL
WBC #/AREA URNS AUTO: ABNORMAL /HPF

## 2024-03-29 PROCEDURE — 69209 REMOVE IMPACTED EAR WAX UNI: CPT | Mod: 50 | Performed by: NURSE PRACTITIONER

## 2024-03-29 PROCEDURE — 81001 URINALYSIS AUTO W/SCOPE: CPT | Performed by: NURSE PRACTITIONER

## 2024-03-29 PROCEDURE — 87804 INFLUENZA ASSAY W/OPTIC: CPT | Mod: QW | Performed by: NURSE PRACTITIONER

## 2024-03-29 PROCEDURE — 99214 OFFICE O/P EST MOD 30 MIN: CPT | Mod: 25 | Performed by: NURSE PRACTITIONER

## 2024-03-29 RX ORDER — OSELTAMIVIR PHOSPHATE 30 MG/1
30 CAPSULE ORAL 2 TIMES DAILY
Qty: 10 CAPSULE | Refills: 0 | Status: SHIPPED | OUTPATIENT
Start: 2024-03-29 | End: 2024-04-03

## 2024-03-29 RX ORDER — RESPIRATORY SYNCYTIAL VIRUS VACCINE 120MCG/0.5
0.5 KIT INTRAMUSCULAR ONCE
Qty: 1 EACH | Refills: 0 | Status: CANCELLED | OUTPATIENT
Start: 2024-03-29 | End: 2024-03-29

## 2024-03-29 ASSESSMENT — ENCOUNTER SYMPTOMS
NAUSEA: 1
HEADACHES: 1

## 2024-03-29 NOTE — PROGRESS NOTES
Assessment & Plan     Influenza B  Testing is positive for influenza B.  Up-to-date on flu vaccine.  Symptoms started suddenly last night, he is in the timeframe for antiviral treatment.  Recommend Tamiflu as below.  Renal dosing employed.  Potential side effects reviewed.  Follow-up for any worsening symptoms.   - oseltamivir (TAMIFLU) 30 MG capsule; Take 1 capsule (30 mg) by mouth 2 times daily for 5 days    Fever, unspecified fever cause  Frontals under consideration include influenza, other viral upper respiratory infection, urinary tract infection.  Testing positive for influenza B.  UA negative for infection.  Does have BPH, he wonders about treatment with prostate supplement.  Did not tolerate finasteride in the past, caused chest pain after 1 dose.  I recommend follow-up with PCP to discuss tamsulosin or similar medication.  - Influenza A & B Antigen - Clinic Collect  - UA Macroscopic with reflex to Microscopic and Culture - Lab Collect; Future  - UA Macroscopic with reflex to Microscopic and Culture - Lab Collect  - UA Microscopic with Reflex to Culture    Bilateral impacted cerumen  He has bilateral impacted cerumen, ear lavage completed by staff.  Patient tolerated procedure well.  - CT REMOVAL IMPACTED CERUMEN IRRIGATION/LVG UNILAT    Stage IIIa chronic kidney disease  Considered in treatment plan, renal dosing employed for Tamiflu.                  Subjective   Sheldon is a 78 year old, presenting for the following health issues:  Nausea (Feeling very nauseous all week), Chills (Pt was having very bad chills and dizziness off and on all week), and Headache (Severe sinus pain last night)        3/29/2024     2:57 PM   Additional Questions   Roomed by MARIA ELENA Young   Accompanied by Spouse     Sheldon is a pleasant 79-year-old male who presents today for evaluation of dizziness onset last night.  He reports severe and sudden dizziness.  He wears a CPAP machine which she states usually clears his sinuses but  "did not work last night.  He feels very stuffy in the nose, feels chilled and cannot get warm.  Temperature 100.8 today.  He took 3 ibuprofen and 2 Excedrin last night.  Then took ibuprofen again at 4 AM today.  They deny sick contacts.  He denies dysuria.  No flank pain.  Accompanied by his wife.    He wonders about prostate supplement called \"prostate control\".  Contains saw palmetto and other supplements.  He denies urinary retention, does have trouble with urinary dripping and incontinence.  He was placed on finasteride but caused side effects.  Per chart review, see finasteride caused chest pain in 2022.  He took only 1 time.  Unsure if he tried tamsulosin.  I recommend follow-up with PCP to discuss.  He has nocturia 4-5 times nightly.  No history of kidney stone.  No dysuria.      History of Present Illness       Reason for visit:  Flu like  Symptom onset:  3-7 days ago  Symptom intensity:  Moderate  Symptom progression:  Improving  Had these symptoms before:  Yes  Has tried/received treatment for these symptoms:  No  What makes it worse:  No  What makes it better:  Yes    He eats 2-3 servings of fruits and vegetables daily.He consumes 1 sweetened beverage(s) daily.He exercises with enough effort to increase his heart rate 10 to 19 minutes per day.  He exercises with enough effort to increase his heart rate 5 days per week.   He is taking medications regularly.                 Review of Systems  Constitutional, HEENT, cardiovascular, pulmonary, gi and gu systems are negative, except as otherwise noted.      Objective    /78 (BP Location: Right arm, Patient Position: Sitting, Cuff Size: Adult Large)   Pulse 62   Temp (!) 100.8  F (38.2  C) (Tympanic)   Resp 20   Ht 1.702 m (5' 7\")   Wt 88.1 kg (194 lb 4.8 oz)   BMI 30.43 kg/m    Body mass index is 30.43 kg/m .  Physical Exam  Constitutional:       Appearance: He is ill-appearing.   HENT:      Head: Normocephalic and atraumatic.      Right Ear: There " is impacted cerumen.      Left Ear: There is impacted cerumen.   Cardiovascular:      Rate and Rhythm: Normal rate and regular rhythm.   Pulmonary:      Effort: Pulmonary effort is normal.      Breath sounds: Normal breath sounds. No wheezing, rhonchi or rales.   Abdominal:      General: Bowel sounds are normal.      Tenderness: There is no abdominal tenderness. There is no guarding.   Lymphadenopathy:      Cervical: No cervical adenopathy.   Neurological:      Mental Status: He is alert and oriented to person, place, and time.   Psychiatric:         Mood and Affect: Mood normal.         Behavior: Behavior normal.                    Signed Electronically by: ARYAN Estevez CNP

## 2024-04-08 DIAGNOSIS — N40.0 BENIGN PROSTATIC HYPERPLASIA, UNSPECIFIED WHETHER LOWER URINARY TRACT SYMPTOMS PRESENT: ICD-10-CM

## 2024-04-08 RX ORDER — FINASTERIDE 5 MG/1
5 TABLET, FILM COATED ORAL DAILY
Qty: 90 TABLET | Refills: 1 | Status: SHIPPED | OUTPATIENT
Start: 2024-04-08

## 2024-04-25 ENCOUNTER — OFFICE VISIT (OUTPATIENT)
Dept: FAMILY MEDICINE | Facility: CLINIC | Age: 79
End: 2024-04-25
Payer: COMMERCIAL

## 2024-04-25 VITALS
BODY MASS INDEX: 29.82 KG/M2 | RESPIRATION RATE: 20 BRPM | OXYGEN SATURATION: 97 % | HEIGHT: 67 IN | DIASTOLIC BLOOD PRESSURE: 71 MMHG | SYSTOLIC BLOOD PRESSURE: 134 MMHG | TEMPERATURE: 97.4 F | WEIGHT: 190 LBS | HEART RATE: 60 BPM

## 2024-04-25 DIAGNOSIS — G47.33 OBSTRUCTIVE SLEEP APNEA SYNDROME: Primary | ICD-10-CM

## 2024-04-25 PROBLEM — I42.9 CARDIOMYOPATHY (H): Status: RESOLVED | Noted: 2022-04-20 | Resolved: 2024-04-25

## 2024-04-25 PROCEDURE — 99213 OFFICE O/P EST LOW 20 MIN: CPT | Performed by: INTERNAL MEDICINE

## 2024-04-25 NOTE — ASSESSMENT & PLAN NOTE
-4/30/2014 polysomnogram revealing severe obstructive sleep apnea with an AHI of 81 and oximetry erica of 64%  -CPAP titration was optimal to 7 cm of water pressure on 5/16/2018  -Currently on APAP 5-15  -Patient is compliant with CPAP and benefiting with decreased daytime sleepiness.  -Happy with replacement device  -Attempting to get clients report from DME provider

## 2024-04-25 NOTE — PROGRESS NOTES
"  Assessment & Plan   Problem List Items Addressed This Visit       Obstructive sleep apnea syndrome - Primary     -4/30/2014 polysomnogram revealing severe obstructive sleep apnea with an AHI of 81 and oximetry erica of 64%  -CPAP titration was optimal to 7 cm of water pressure on 5/16/2018  -Currently on APAP 5-15  -Patient is compliant with CPAP and benefiting with decreased daytime sleepiness.  -Happy with replacement device  -Attempting to get clients report from DME provider                    BMI  Estimated body mass index is 29.76 kg/m  as calculated from the following:    Height as of this encounter: 1.702 m (5' 7\").    Weight as of this encounter: 86.2 kg (190 lb).       CPAP follow-up in 1      Subjective   Sheldon is a 79 year old, presenting for the following health issues:  Sleep Apnea (Pt here for a CPAP/CORBY Follow-up )      4/25/2024    12:47 PM   Additional Questions   Roomed by Marcial CARDENAS     History of Present Illness       Reason for visit:  Sleep apnea    He eats 2-3 servings of fruits and vegetables daily.He consumes 1 sweetened beverage(s) daily.He exercises with enough effort to increase his heart rate 9 or less minutes per day.  He exercises with enough effort to increase his heart rate 3 or less days per week.   He is taking medications regularly.     He is very happy with his new CPAP device is using it every night.  No daytime sleepiness.  He does have nocturia.  No edema, headache, heartburn.              Review of Systems  Constitutional, HEENT, cardiovascular, pulmonary, gi and gu systems are negative, except as otherwise noted.      Objective    /71 (BP Location: Right arm, Patient Position: Sitting, Cuff Size: Adult Large)   Pulse 60   Temp 97.4  F (36.3  C) (Tympanic)   Resp 20   Ht 1.702 m (5' 7\")   Wt 86.2 kg (190 lb)   SpO2 97%   BMI 29.76 kg/m    Body mass index is 29.76 kg/m .  Physical Exam   Healthy-appearing older man in no distress  Alert and oriented  In good " spirits              Signed Electronically by: Jose Ham MD

## 2024-06-17 DIAGNOSIS — I25.5 ISCHEMIC CARDIOMYOPATHY: ICD-10-CM

## 2024-06-17 DIAGNOSIS — I10 ESSENTIAL (PRIMARY) HYPERTENSION: ICD-10-CM

## 2024-06-17 RX ORDER — ATORVASTATIN CALCIUM 40 MG/1
40 TABLET, FILM COATED ORAL DAILY
Qty: 90 TABLET | Refills: 2 | Status: SHIPPED | OUTPATIENT
Start: 2024-06-17

## 2024-07-02 ENCOUNTER — OFFICE VISIT (OUTPATIENT)
Dept: FAMILY MEDICINE | Facility: CLINIC | Age: 79
End: 2024-07-02
Payer: COMMERCIAL

## 2024-07-02 VITALS
TEMPERATURE: 97.7 F | DIASTOLIC BLOOD PRESSURE: 71 MMHG | WEIGHT: 176 LBS | OXYGEN SATURATION: 99 % | RESPIRATION RATE: 20 BRPM | BODY MASS INDEX: 27.62 KG/M2 | HEART RATE: 72 BPM | SYSTOLIC BLOOD PRESSURE: 124 MMHG | HEIGHT: 67 IN

## 2024-07-02 DIAGNOSIS — N18.31 STAGE 3A CHRONIC KIDNEY DISEASE (H): ICD-10-CM

## 2024-07-02 DIAGNOSIS — I25.10 CORONARY ARTERY DISEASE INVOLVING NATIVE CORONARY ARTERY OF NATIVE HEART WITHOUT ANGINA PECTORIS: ICD-10-CM

## 2024-07-02 DIAGNOSIS — I10 BENIGN ESSENTIAL HYPERTENSION: ICD-10-CM

## 2024-07-02 DIAGNOSIS — G47.33 OBSTRUCTIVE SLEEP APNEA SYNDROME: ICD-10-CM

## 2024-07-02 DIAGNOSIS — K80.50 CHOLEDOCHOLITHIASIS: Primary | ICD-10-CM

## 2024-07-02 DIAGNOSIS — Z90.49 S/P CHOLECYSTECTOMY: ICD-10-CM

## 2024-07-02 PROBLEM — K80.00 CALCULUS OF GALLBLADDER WITH ACUTE CHOLECYSTITIS WITHOUT OBSTRUCTION: Status: RESOLVED | Noted: 2019-09-20 | Resolved: 2024-07-02

## 2024-07-02 PROCEDURE — 36415 COLL VENOUS BLD VENIPUNCTURE: CPT | Performed by: INTERNAL MEDICINE

## 2024-07-02 PROCEDURE — 80048 BASIC METABOLIC PNL TOTAL CA: CPT | Performed by: INTERNAL MEDICINE

## 2024-07-02 PROCEDURE — 99496 TRANSJ CARE MGMT HIGH F2F 7D: CPT | Performed by: INTERNAL MEDICINE

## 2024-07-02 RX ORDER — ONDANSETRON 4 MG/1
4 TABLET, ORALLY DISINTEGRATING ORAL PRN
COMMUNITY
Start: 2024-06-26 | End: 2024-07-11

## 2024-07-02 RX ORDER — CALCIUM CARBONATE 600 MG
1 TABLET ORAL EVERY MORNING
COMMUNITY

## 2024-07-02 NOTE — ASSESSMENT & PLAN NOTE
Angiogram 4/9/2018 with mild CAD including a 30% LAD lesion  No angina on  Atorvastatin 40, metoprolol , lisinopril 20, ASA 81

## 2024-07-02 NOTE — ASSESSMENT & PLAN NOTE
Cholecystectomy in 2019 after ERCP  2 recent ERCP for choledocholithiasis the last 6/28/2024  Resolution of symptoms

## 2024-07-02 NOTE — LETTER
July 3, 2024      Sheldon Weems   [facility] [id number]        Dear ,    We are writing to inform you of your test results.    Results are acceptable.     Resulted Orders   Basic metabolic panel  (Ca, Cl, CO2, Creat, Gluc, K, Na, BUN)   Result Value Ref Range    Sodium 141 135 - 145 mmol/L    Potassium 4.4 3.4 - 5.3 mmol/L    Chloride 106 98 - 107 mmol/L    Carbon Dioxide (CO2) 27 22 - 29 mmol/L    Anion Gap 8 7 - 15 mmol/L    Urea Nitrogen 19.9 8.0 - 23.0 mg/dL    Creatinine 0.97 0.67 - 1.17 mg/dL    GFR Estimate 78 >60 mL/min/1.73m2      Comment:      eGFR calculated using 2021 CKD-EPI equation.    Calcium 10.2 8.8 - 10.2 mg/dL    Glucose 119 (H) 70 - 99 mg/dL       If you have any questions or concerns, please call the clinic at the number listed above.       Sincerely,      Jose Ham MD

## 2024-07-02 NOTE — ASSESSMENT & PLAN NOTE
-4/30/2014 polysomnogram revealing severe obstructive sleep apnea with an AHI of 81 and oximetry erica of 64%  -CPAP titration was optimal to 7 cm of water pressure on 5/16/2018  -Currently on APAP 5-15  -Patient is compliant with CPAP and benefiting with decreased daytime sleepiness.  -Happy with replacement device

## 2024-07-02 NOTE — PROGRESS NOTES
Assessment & Plan   Problem List Items Addressed This Visit       Benign essential hypertension     Controlled with current regimen         Obstructive sleep apnea syndrome     -4/30/2014 polysomnogram revealing severe obstructive sleep apnea with an AHI of 81 and oximetry erica of 64%  -CPAP titration was optimal to 7 cm of water pressure on 5/16/2018  -Currently on APAP 5-15  -Patient is compliant with CPAP and benefiting with decreased daytime sleepiness.  -Happy with replacement device           Stage 3a chronic kidney disease (H)    Relevant Orders    Basic metabolic panel  (Ca, Cl, CO2, Creat, Gluc, K, Na, BUN)    Adult Nutrition  Referral    Coronary artery disease involving native coronary artery of native heart without angina pectoris     Angiogram 4/9/2018 with mild CAD including a 30% LAD lesion  No angina on  Atorvastatin 40, metoprolol , lisinopril 20, ASA 81         Choledocholithiasis - Primary     Cholecystectomy in 2019 after ERCP  2 recent ERCP for choledocholithiasis the last 6/28/2024  Resolution of symptoms         Relevant Orders    Adult Nutrition  Referral    S/P cholecystectomy           MED REC REQUIRED  Post Medication Reconciliation Status: discharge medications reconciled, continue medications without change        Rukhsana Chung is a 79 year old, presenting for the following health issues:  Hospital F/U ([facility], 06/28/2024 - 06/29/2024, Post operation fever )        7/2/2024    11:08 AM   Additional Questions   Roomed by MARIA ELENA Evans   Accompanied by Josey LEWIS   Patient was hospitalized twice in the end of June with 2 ERCPs the last 1 on 6/28/2024.  He had fever after the initial ERCP 6/23/2024.  Cholecystectomy 2019 after ERCP.  No fever, abdominal pain, nausea, vomiting, diarrhea.  No angina.  Using his CPAP every night.  Lost weight with this illness but has a good appetite.      Hospital Follow-up  "Visit:    Hospital/Nursing Home/IP Rehab Facility: [facility]   Date of Admission: 06/28/2024   Date of Discharge: 06/29/2024  Reason(s) for Admission: Post operation fever  Was the patient in the ICU or did the patient experience delirium during hospitalization?  No  Do you have any other stressors you would like to discuss with your provider? No    Problems taking medications regularly:  None  Medication changes since discharge: None  Problems adhering to non-medication therapy:  None    Summary of hospitalization:  CareEverywhere information obtained and reviewed  Diagnostic Tests/Treatments reviewed.  Follow up needed: none  Other Healthcare Providers Involved in Patient s Care:         None  Update since discharge: improved.         Plan of care communicated with patient and family                   Review of Systems  Constitutional, HEENT, cardiovascular, pulmonary, gi and gu systems are negative, except as otherwise noted.      Objective    /71 (BP Location: Right arm, Patient Position: Sitting, Cuff Size: Adult Large)   Pulse 72   Temp 97.7  F (36.5  C) (Oral)   Resp 20   Ht 1.702 m (5' 7\")   Wt 79.8 kg (176 lb)   SpO2 99%   BMI 27.57 kg/m    Body mass index is 27.57 kg/m .  Physical Exam   Healthy-appearing older man in no distress  Walks well with cane  Sclera anicteric  Lungs clear  Cardiac exam regular no murmur or edema  Abdomen soft, nondistended, nontender  Alert and oriented  In good spirits            Signed Electronically by: Jose Ham MD    "

## 2024-07-03 LAB
ANION GAP SERPL CALCULATED.3IONS-SCNC: 8 MMOL/L (ref 7–15)
BUN SERPL-MCNC: 19.9 MG/DL (ref 8–23)
CALCIUM SERPL-MCNC: 10.2 MG/DL (ref 8.8–10.2)
CHLORIDE SERPL-SCNC: 106 MMOL/L (ref 98–107)
CREAT SERPL-MCNC: 0.97 MG/DL (ref 0.67–1.17)
DEPRECATED HCO3 PLAS-SCNC: 27 MMOL/L (ref 22–29)
EGFRCR SERPLBLD CKD-EPI 2021: 78 ML/MIN/1.73M2
GLUCOSE SERPL-MCNC: 119 MG/DL (ref 70–99)
POTASSIUM SERPL-SCNC: 4.4 MMOL/L (ref 3.4–5.3)
SODIUM SERPL-SCNC: 141 MMOL/L (ref 135–145)

## 2024-07-09 DIAGNOSIS — I10 ESSENTIAL (PRIMARY) HYPERTENSION: ICD-10-CM

## 2024-07-09 RX ORDER — LISINOPRIL 20 MG/1
TABLET ORAL
Qty: 200 TABLET | Refills: 3 | Status: SHIPPED | OUTPATIENT
Start: 2024-07-09

## 2024-07-09 NOTE — TELEPHONE ENCOUNTER
Patient has [facility] coverage and with this insurance plan, the patient is eligible to receive certain prescriptions as a 100-day supply at the 90-day supply cost.      Prescriptions to be updated to 100-day supply: lisinopril    New prescription needed given insufficient refills so pended for PCP review and signature.       Thank you!    Mony Persaud, PharmD, [facility]  Population Health Pharmacist  [phone]

## 2024-08-27 DIAGNOSIS — I10 ESSENTIAL (PRIMARY) HYPERTENSION: ICD-10-CM

## 2024-08-27 DIAGNOSIS — I25.5 ISCHEMIC CARDIOMYOPATHY: ICD-10-CM

## 2024-08-27 RX ORDER — METOPROLOL SUCCINATE 100 MG/1
100 TABLET, EXTENDED RELEASE ORAL DAILY
Qty: 90 TABLET | Refills: 2 | Status: SHIPPED | OUTPATIENT
Start: 2024-08-27

## 2024-11-05 DIAGNOSIS — N40.0 BENIGN PROSTATIC HYPERPLASIA, UNSPECIFIED WHETHER LOWER URINARY TRACT SYMPTOMS PRESENT: ICD-10-CM

## 2024-11-06 RX ORDER — FINASTERIDE 5 MG/1
5 TABLET, FILM COATED ORAL DAILY
Qty: 90 TABLET | Refills: 1 | Status: SHIPPED | OUTPATIENT
Start: 2024-11-06

## 2024-11-12 ENCOUNTER — PATIENT OUTREACH (OUTPATIENT)
Dept: FAMILY MEDICINE | Facility: CLINIC | Age: 79
End: 2024-11-12
Payer: COMMERCIAL

## 2024-11-12 NOTE — TELEPHONE ENCOUNTER
Patient Quality Outreach    Patient is due for the following:   Physical Annual Wellness Visit    Action(s) Taken:   Patient was scheduled for 12-19-24     Type of outreach:    Phone, spoke to patient/parent.      Questions for provider review:    None           Jacque Lowe

## 2024-12-12 DIAGNOSIS — R42 VERTIGO: ICD-10-CM

## 2024-12-12 RX ORDER — MECLIZINE HYDROCHLORIDE 25 MG/1
TABLET ORAL
Qty: 270 TABLET | Refills: 0 | Status: SHIPPED | OUTPATIENT
Start: 2024-12-12

## 2025-01-02 ENCOUNTER — OFFICE VISIT (OUTPATIENT)
Dept: FAMILY MEDICINE | Facility: CLINIC | Age: 80
End: 2025-01-02
Payer: COMMERCIAL

## 2025-01-02 VITALS
SYSTOLIC BLOOD PRESSURE: 135 MMHG | WEIGHT: 170 LBS | TEMPERATURE: 97.6 F | DIASTOLIC BLOOD PRESSURE: 80 MMHG | OXYGEN SATURATION: 98 % | HEART RATE: 60 BPM | BODY MASS INDEX: 26.68 KG/M2 | HEIGHT: 67 IN | RESPIRATION RATE: 20 BRPM

## 2025-01-02 DIAGNOSIS — I25.10 CORONARY ARTERY DISEASE INVOLVING NATIVE CORONARY ARTERY OF NATIVE HEART WITHOUT ANGINA PECTORIS: Primary | ICD-10-CM

## 2025-01-02 DIAGNOSIS — N40.0 BENIGN PROSTATIC HYPERPLASIA, UNSPECIFIED WHETHER LOWER URINARY TRACT SYMPTOMS PRESENT: ICD-10-CM

## 2025-01-02 DIAGNOSIS — I10 ESSENTIAL (PRIMARY) HYPERTENSION: ICD-10-CM

## 2025-01-02 DIAGNOSIS — I42.8 NICM (NONISCHEMIC CARDIOMYOPATHY) (H): ICD-10-CM

## 2025-01-02 DIAGNOSIS — I25.5 ISCHEMIC CARDIOMYOPATHY: ICD-10-CM

## 2025-01-02 DIAGNOSIS — N18.31 STAGE 3A CHRONIC KIDNEY DISEASE (H): ICD-10-CM

## 2025-01-02 DIAGNOSIS — Z00.00 MEDICARE ANNUAL WELLNESS VISIT, SUBSEQUENT: ICD-10-CM

## 2025-01-02 LAB
CHOLEST SERPL-MCNC: 131 MG/DL
CREAT UR-MCNC: 149 MG/DL
FASTING STATUS PATIENT QL REPORTED: NORMAL
HDLC SERPL-MCNC: 63 MG/DL
HGB BLD-MCNC: 14.6 G/DL (ref 13.3–17.7)
LDLC SERPL CALC-MCNC: 53 MG/DL
MICROALBUMIN UR-MCNC: 16 MG/L
MICROALBUMIN/CREAT UR: 10.74 MG/G CR (ref 0–17)
NONHDLC SERPL-MCNC: 68 MG/DL
TRIGL SERPL-MCNC: 74 MG/DL

## 2025-01-02 RX ORDER — METOPROLOL SUCCINATE 100 MG/1
100 TABLET, EXTENDED RELEASE ORAL DAILY
Qty: 90 TABLET | Refills: 3 | Status: SHIPPED | OUTPATIENT
Start: 2025-01-02

## 2025-01-02 RX ORDER — FINASTERIDE 5 MG/1
5 TABLET, FILM COATED ORAL DAILY
Qty: 90 TABLET | Refills: 3 | Status: SHIPPED | OUTPATIENT
Start: 2025-01-02

## 2025-01-02 RX ORDER — ATORVASTATIN CALCIUM 40 MG/1
40 TABLET, FILM COATED ORAL DAILY
Qty: 90 TABLET | Refills: 3 | Status: SHIPPED | OUTPATIENT
Start: 2025-01-02

## 2025-01-02 RX ORDER — LISINOPRIL 20 MG/1
TABLET ORAL
Qty: 200 TABLET | Refills: 3 | Status: SHIPPED | OUTPATIENT
Start: 2025-01-02

## 2025-01-02 SDOH — HEALTH STABILITY: PHYSICAL HEALTH: ON AVERAGE, HOW MANY MINUTES DO YOU ENGAGE IN EXERCISE AT THIS LEVEL?: 0 MIN

## 2025-01-02 SDOH — HEALTH STABILITY: PHYSICAL HEALTH: ON AVERAGE, HOW MANY DAYS PER WEEK DO YOU ENGAGE IN MODERATE TO STRENUOUS EXERCISE (LIKE A BRISK WALK)?: 0 DAYS

## 2025-01-02 ASSESSMENT — SOCIAL DETERMINANTS OF HEALTH (SDOH): HOW OFTEN DO YOU GET TOGETHER WITH FRIENDS OR RELATIVES?: ONCE A WEEK

## 2025-01-02 NOTE — PROGRESS NOTES
"Preventive Care Visit  [facility]  Mike Strong MD, Family Medicine  Jan 2, 2025      Assessment & Plan     Coronary artery disease involving native coronary artery of native heart without angina pectoris  Stable, Controlled by prescription medication prescribed by me and up to date.  - Lipid panel reflex to direct LDL Non-fasting; Future    Stage 3a chronic kidney disease (H)  Stable, Controlled by prescription medication prescribed by me and up to date.  - Albumin Random Urine Quantitative with Creat Ratio; Future  - Hemoglobin; Future    Ischemic cardiomyopathy  Stable, Controlled by prescription medication prescribed by me and up to date.  - atorvastatin (LIPITOR) 40 MG tablet; Take 1 tablet (40 mg) by mouth daily.  - metoprolol succinate ER (TOPROL XL) 100 MG 24 hr tablet; Take 1 tablet (100 mg) by mouth daily.    Essential (primary) hypertension  Stable, Controlled by prescription medication prescribed by me and up to date.  - atorvastatin (LIPITOR) 40 MG tablet; Take 1 tablet (40 mg) by mouth daily.  - lisinopril (ZESTRIL) 20 MG tablet; TAKE TWO Tablets BY MOUTH EVERY DAY  - metoprolol succinate ER (TOPROL XL) 100 MG 24 hr tablet; Take 1 tablet (100 mg) by mouth daily.    Benign prostatic hyperplasia, unspecified whether lower urinary tract symptoms present  Stable, Controlled by prescription medication prescribed by me and up to date.  - finasteride (PROSCAR) 5 MG tablet; Take 1 tablet (5 mg) by mouth daily.    Medicare annual wellness visit, subsequent      Patient has been advised of split billing requirements and indicates understanding: Yes        BMI  Estimated body mass index is 26.63 kg/m  as calculated from the following:    Height as of this encounter: 1.702 m (5' 7\").    Weight as of this encounter: 77.1 kg (170 lb).       Counseling  Appropriate preventive services were addressed with this patient via screening, questionnaire, or discussion as appropriate for " fall prevention, nutrition, physical activity, Tobacco-use cessation, social engagement, weight loss and cognition.  Checklist reviewing preventive services available has been given to the patient.  Reviewed patient's diet, addressing concerns and/or questions.   The patient was provided with written information regarding signs of hearing loss.   Information on urinary incontinence and treatment options given to patient.           Rukhsana Chung is a 79 year old, presenting for the following:  Wellness Visit (Pt here for an AWV.)        7/2/2024    11:08 AM   Additional Questions   Roomed by MARIA ELENA Walker   Accompanied by Josey         No hearing test performed today.  Pt has hearing aids      HPI      Hypertension Follow-up    Do you check your blood pressure regularly outside of the clinic? No   Are you following a low salt diet? Yes  Are your blood pressures ever more than 140 on the top number (systolic) OR more   than 90 on the bottom number (diastolic), for example 140/90? No  Hyperlipidemia Follow-Up    Are you regularly taking any medication or supplement to lower your cholesterol?   Yes-    Are you having muscle aches or other side effects that you think could be caused by your cholesterol lowering medication?  No    Vascular Disease Follow-up    How often do you take nitroglycerin? Never  Do you take an aspirin every day? Yes    Chronic Kidney Disease Follow-up     Do you take any over the counter pain medicine?: No  Health Care Directive  Patient does not have a Health Care Directive: Patient states has Advance Directive and will bring in a copy to clinic.      1/2/2025   General Health   How would you rate your overall physical health? (!) FAIR   Feel stress (tense, anxious, or unable to sleep) Not at all         1/2/2025   Nutrition   Diet: Regular (no restrictions)         1/2/2025   Exercise   Days per week of moderate/strenous exercise 0 days   Average minutes spent exercising at this level 0  min   (!) EXERCISE CONCERN      1/2/2025   Social Factors   Frequency of gathering with friends or relatives Once a week   Worry food won't last until get money to buy more No   Food not last or not have enough money for food? No   Do you have housing? (Housing is defined as stable permanent housing and does not include staying ouside in a car, in a tent, in an abandoned building, in an overnight shelter, or couch-surfing.) Yes   Are you worried about losing your housing? No   Lack of transportation? No   Unable to get utilities (heat,electricity)? No         1/2/2025   Fall Risk   Fallen 2 or more times in the past year? Yes   Trouble with walking or balance? Yes           1/2/2025   Activities of Daily Living- Home Safety   Needs help with the following daily activites None of the above   Safety concerns in the home None of the above         1/2/2025   Dental   Dentist two times every year? Yes         1/2/2025   Hearing Screening   Hearing concerns? (!) I NEED TO ASK PEOPLE TO SPEAK UP OR REPEAT THEMSELVES.    (!) IT'S HARDER TO UNDERSTAND WOMEN'S VOICES THAN MEN'S VOICES.    (!) IT'S HARD TO FOLLOW A CONVERSATION IN A NOISY RESTAURANT OR CROWDED ROOM.    (!) TROUBLE UNDESTANDING A SPEAKER IN A PUBLIC MEETING OR [employer] SERVICE.    (!) TROUBLE FOLLOWING DIALOGUE IN THE THEATHER.    (!) TROUBLE UNDERSTANDING SOFT OR WHISPERED SPEECH.    (!) TROUBLE UNDERSTANDING SPEECH ON THE TELEPHONE       Multiple values from one day are sorted in reverse-chronological order         1/2/2025   Driving Risk Screening   Patient/family members have concerns about driving No         1/2/2025   General Alertness/Fatigue Screening   Have you been more tired than usual lately? No         1/2/2025   Urinary Incontinence Screening   Bothered by leaking urine in past 6 months Yes         1/2/2025   TB Screening   Were you born outside of the [place]? No         Today's PHQ-2 Score:       1/2/2025     1:57 PM   PHQ-2 ( 1999 Pf[employer])   Q1:  Little interest or pleasure in doing things 0   Q2: Feeling down, depressed or hopeless 0   PHQ-2 Score 0    Q1: Little interest or pleasure in doing things Not at all   Q2: Feeling down, depressed or hopeless Not at all   PHQ-2 Score 0       Patient-reported           2025   Substance Use   Alcohol more than 3/day or more than 7/wk Not Applicable   Do you have a current opioid prescription? No   How severe/bad is pain from 1 to 10? 0/10 (No Pain)   Do you use any other substances recreationally? No     Social History     Tobacco Use    Smoking status: Former     Current packs/day: 0.00     Average packs/day: 1 pack/day for 25.0 years (25.0 ttl pk-yrs)     Types: Cigarettes     Start date:      Quit date:      Years since quittin.0     Passive exposure: Past    Smokeless tobacco: Never   Vaping Use    Vaping status: Never Used   Substance Use Topics    Alcohol use: Not Currently    Drug use: Never                 Reviewed and updated as needed this visit by Provider                      Current providers sharing in care for this patient include:  Patient Care Team:  Mike Strong MD as PCP - General (Family Medicine)  Jose Ham MD as Assigned PCP    The following health maintenance items are reviewed in Epic and correct as of today:  Health Maintenance   Topic Date Due    DTAP/TDAP/TD IMMUNIZATION (2 - Td or Tdap) 2015    ZOSTER IMMUNIZATION (2 of 3) 2015    RSV VACCINE (1 - 1-dose 75+ series) Never done    LIPID  2024    MICROALBUMIN  2024    MEDICARE ANNUAL WELLNESS VISIT  10/19/2024    HEMOGLOBIN  2024    BMP  2025    ANNUAL REVIEW OF HM ORDERS  2026    FALL RISK ASSESSMENT  2026    ADVANCE CARE PLANNING  2030    PHQ-2 (once per calendar year)  Completed    INFLUENZA VACCINE  Completed    Pneumococcal Vaccine: 50+ Years  Completed    URINALYSIS  Completed    COVID-19 Vaccine  Completed    HPV IMMUNIZATION  Aged Out     "MENINGITIS IMMUNIZATION  Aged Out    RSV MONOCLONAL ANTIBODY  Aged Out    COLORECTAL CANCER SCREENING  Discontinued            Objective    Exam  /80   Pulse 60   Temp 97.6  F (36.4  C) (Tympanic)   Resp 20   Ht 1.702 m (5' 7\")   Wt 77.1 kg (170 lb)   SpO2 98%   BMI 26.63 kg/m     Estimated body mass index is 26.63 kg/m  as calculated from the following:    Height as of this encounter: 1.702 m (5' 7\").    Weight as of this encounter: 77.1 kg (170 lb).    Physical Exam  GENERAL: alert and no distress  NECK: no adenopathy, no asymmetry, masses, or scars  RESP: lungs clear to auscultation - no rales, rhonchi or wheezes  CV: regular rate and rhythm, normal S1 S2, no S3 or S4, no murmur, click or rub, no peripheral edema  ABDOMEN: soft, nontender, no hepatosplenomegaly, no masses and bowel sounds normal  MS: no gross musculoskeletal defects noted, no edema        1/2/2025   Mini Cog   Clock Draw Score 2 Normal   3 Item Recall 3 objects recalled   Mini Cog Total Score 5              Signed Electronically by: Mike Strong MD    "

## 2025-06-13 PROCEDURE — 80048 BASIC METABOLIC PNL TOTAL CA: CPT | Performed by: FAMILY MEDICINE

## 2025-06-13 PROCEDURE — G0103 PSA SCREENING: HCPCS | Performed by: FAMILY MEDICINE

## 2025-06-16 ENCOUNTER — RESULTS FOLLOW-UP (OUTPATIENT)
Dept: FAMILY MEDICINE | Facility: CLINIC | Age: 80
End: 2025-06-16
Payer: COMMERCIAL

## 2025-06-16 NOTE — LETTER
June 16, 2025      Sheldon Weems   [facility] [place] [address]        Dear ,    We are writing to inform you of your test results.    Your test results fall within the expected range(s) or remain unchanged from previous results.  Please continue with current treatment plan.    Resulted Orders   BASIC METABOLIC PANEL   Result Value Ref Range    Sodium 140 135 - 145 mmol/L    Potassium 4.5 3.4 - 5.3 mmol/L    Chloride 106 98 - 107 mmol/L    Carbon Dioxide (CO2) 24 22 - 29 mmol/L    Anion Gap 10 7 - 15 mmol/L    Urea Nitrogen 20.2 8.0 - 23.0 mg/dL    Creatinine 1.06 0.67 - 1.17 mg/dL    GFR Estimate 70 >60 mL/min/1.73m2      Comment:      eGFR calculated using 2021 CKD-EPI equation.    Calcium 9.2 8.8 - 10.4 mg/dL    Glucose 98 70 - 99 mg/dL   PSA, screen   Result Value Ref Range    Prostate Specific Antigen Screen 1.16 ng/mL      Comment:      No reference ranges have been established for patients over 80 years.    Narrative    This result is obtained using the Roche Elecsys total PSA method on the joslyn e801 immunoassay analyzer, which is an ultrasensitive method. Results obtained with different assay methods or kits cannot be used interchangeably.  This test is intended for initial prostate cancer screening. PSA values exceeding the age-specific limits are suspicious for prostate disease, but additional testing, such as prostate biopsy, is needed to diagnose prostate pathology. The American Cancer Society recommends annual examination with digital rectal examination and serum PSA beginning at age 50 and for men with a life expectancy of at least 10 years after detection of prostate cancer. For men in high-risk groups, such as  Americans or men with a first-degree relative diagnosed at a younger age, testing should begin at a younger age. It is generally recommended that information be provided to patients about the benefits and limitations of testing and treatment so they can make  informed decisions.       If you have any questions or concerns, please call the clinic at the number listed above.       Sincerely,      Mike Strong MD    Electronically signed

## 2025-06-16 NOTE — LETTER
June 17, 2025      Sheldon Weems   [facility]ly [facility], later [facility] [place] [address]        Dear ,    We are writing to inform you of your test results.    Your test results fall within the expected range(s) or remain unchanged from previous results.  Please continue with current treatment plan.    Resulted Orders   BASIC METABOLIC PANEL   Result Value Ref Range    Sodium 140 135 - 145 mmol/L    Potassium 4.5 3.4 - 5.3 mmol/L    Chloride 106 98 - 107 mmol/L    Carbon Dioxide (CO2) 24 22 - 29 mmol/L    Anion Gap 10 7 - 15 mmol/L    Urea Nitrogen 20.2 8.0 - 23.0 mg/dL    Creatinine 1.06 0.67 - 1.17 mg/dL    GFR Estimate 70 >60 mL/min/1.73m2      Comment:      eGFR calculated using 2021 CKD-EPI equation.    Calcium 9.2 8.8 - 10.4 mg/dL    Glucose 98 70 - 99 mg/dL   PSA, screen   Result Value Ref Range    Prostate Specific Antigen Screen 1.16 ng/mL      Comment:      No reference ranges have been established for patients over 80 years.    Narrative    This result is obtained using the Roche Elecsys total PSA method on the joslyn e801 immunoassay analyzer, which is an ultrasensitive method. Results obtained with different assay methods or kits cannot be used interchangeably.  This test is intended for initial prostate cancer screening. PSA values exceeding the age-specific limits are suspicious for prostate disease, but additional testing, such as prostate biopsy, is needed to diagnose prostate pathology. The American Cancer Society recommends annual examination with digital rectal examination and serum PSA beginning at age 50 and for men with a life expectancy of at least 10 years after detection of prostate cancer. For men in high-risk groups, such as  Americans or men with a first-degree relative diagnosed at a younger age, testing should begin at a younger age. It is generally recommended that information be provided to patients about the benefits and limitations of testing and treatment so they can make  informed decisions.       If you have any questions or concerns, please call the clinic at the number listed above.       Sincerely,      Mike Strong MD    Electronically signed

## 2025-06-26 ENCOUNTER — TRANSFERRED RECORDS (OUTPATIENT)
Dept: HEALTH INFORMATION MANAGEMENT | Facility: CLINIC | Age: 80
End: 2025-06-26
Payer: COMMERCIAL

## 2025-07-03 ENCOUNTER — TELEPHONE (OUTPATIENT)
Dept: FAMILY MEDICINE | Facility: CLINIC | Age: 80
End: 2025-07-03
Payer: COMMERCIAL

## 2025-07-03 NOTE — TELEPHONE ENCOUNTER
orms Request  Name of form/paperwork: disabled parking idenitication card renew  Have you been seen for this request: Yes:     Do we have the form: Dr Sepulveda's mailbox  When is form needed by: when done   How would you like the form returned:    Patient Notified form requests are processed in 3-5 business days: yes    Okay to leave a detailed message? yes

## 2025-07-10 ENCOUNTER — TRANSFERRED RECORDS (OUTPATIENT)
Dept: HEALTH INFORMATION MANAGEMENT | Facility: CLINIC | Age: 80
End: 2025-07-10
Payer: COMMERCIAL

## 2025-07-17 ENCOUNTER — OFFICE VISIT (OUTPATIENT)
Dept: FAMILY MEDICINE | Facility: CLINIC | Age: 80
End: 2025-07-17
Payer: COMMERCIAL

## 2025-07-17 VITALS
DIASTOLIC BLOOD PRESSURE: 70 MMHG | BODY MASS INDEX: 28.33 KG/M2 | OXYGEN SATURATION: 98 % | HEART RATE: 47 BPM | WEIGHT: 180.5 LBS | RESPIRATION RATE: 16 BRPM | TEMPERATURE: 97.9 F | SYSTOLIC BLOOD PRESSURE: 128 MMHG | HEIGHT: 67 IN

## 2025-07-17 DIAGNOSIS — Z87.898 HISTORY OF SHORT TERM MEMORY LOSS: ICD-10-CM

## 2025-07-17 DIAGNOSIS — R26.81 UNSTEADY GAIT: Primary | ICD-10-CM

## 2025-07-17 DIAGNOSIS — R42 VERTIGO: ICD-10-CM

## 2025-07-17 LAB
ALBUMIN UR-MCNC: 30 MG/DL
APPEARANCE UR: CLEAR
BACTERIA #/AREA URNS HPF: ABNORMAL /HPF
BILIRUB UR QL STRIP: ABNORMAL
COLOR UR AUTO: YELLOW
ERYTHROCYTE [DISTWIDTH] IN BLOOD BY AUTOMATED COUNT: 13 % (ref 10–15)
GLUCOSE UR STRIP-MCNC: NEGATIVE MG/DL
HCT VFR BLD AUTO: 44.6 % (ref 40–53)
HGB BLD-MCNC: 14.4 G/DL (ref 13.3–17.7)
HGB UR QL STRIP: ABNORMAL
HYALINE CASTS #/AREA URNS LPF: ABNORMAL /LPF
KETONES UR STRIP-MCNC: ABNORMAL MG/DL
LEUKOCYTE ESTERASE UR QL STRIP: NEGATIVE
MCH RBC QN AUTO: 29.3 PG (ref 26.5–33)
MCHC RBC AUTO-ENTMCNC: 32.3 G/DL (ref 31.5–36.5)
MCV RBC AUTO: 91 FL (ref 78–100)
MUCOUS THREADS #/AREA URNS LPF: PRESENT /LPF
NITRATE UR QL: NEGATIVE
PH UR STRIP: 5.5 [PH] (ref 5–7)
PLATELET # BLD AUTO: 238 10E3/UL (ref 150–450)
RBC # BLD AUTO: 4.91 10E6/UL (ref 4.4–5.9)
RBC #/AREA URNS AUTO: ABNORMAL /HPF
SP GR UR STRIP: 1.02 (ref 1–1.03)
SQUAMOUS #/AREA URNS AUTO: ABNORMAL /LPF
UROBILINOGEN UR STRIP-ACNC: 0.2 E.U./DL
WBC # BLD AUTO: 8.2 10E3/UL (ref 4–11)
WBC #/AREA URNS AUTO: ABNORMAL /HPF

## 2025-07-17 PROCEDURE — 80048 BASIC METABOLIC PNL TOTAL CA: CPT | Performed by: FAMILY MEDICINE

## 2025-07-17 NOTE — PROGRESS NOTES
"  Assessment & Plan     Unsteady gait  - CBC with platelets; Future  - Basic metabolic panel  (Ca, Cl, CO2, Creat, Gluc, K, Na, BUN); Future  - UA Macroscopic with reflex to Microscopic and Culture - Lab Collect; Future  - Adult Neurology  Referral; Future  Referral to  for 2nd opinion    Vertigo  - CBC with platelets; Future  - Basic metabolic panel  (Ca, Cl, CO2, Creat, Gluc, K, Na, BUN); Future  - UA Macroscopic with reflex to Microscopic and Culture - Lab Collect; Future  - Adult Neurology  Referral; Future  Referral to  for 2nd opinion    History of short term memory loss  worsening  - Adult Neurology  Referral; Future    BMI  Estimated body mass index is 28.27 kg/m  as calculated from the following:    Height as of this encounter: 1.702 m (5' 7\").    Weight as of this encounter: 81.9 kg (180 lb 8 oz).           Rukhsana Chung is a 80 year old, presenting for the following health issues:  Dizziness (Pt has been having worsening vertigo recently. )        7/17/2025     2:23 PM   Additional Questions   Roomed by Valerie   Accompanied by Josey-Wife     History of Present Illness       Reason for visit:  Dizziness and left ankle soreness from injury   He is taking medications regularly.        Recurrent problems with vertigo.  ?  Related to eyes, being followed by Dr. Munguia and ophthalmology.  PT not helping  .            Objective    /70   Pulse (!) 47   Temp 97.9  F (36.6  C) (Tympanic)   Resp 16   Ht 1.702 m (5' 7\")   Wt 81.9 kg (180 lb 8 oz)   SpO2 98%   BMI 28.27 kg/m    Body mass index is 28.27 kg/m .  Physical Exam   GENERAL: alert and no distress  NECK: no adenopathy, no asymmetry, masses, or scars  RESP: lungs clear to auscultation - no rales, rhonchi or wheezes  CV: regular rate and rhythm, normal S1 S2, no S3 or S4, no murmur, click or rub, no peripheral edema  ABDOMEN: soft, nontender, no hepatosplenomegaly, no masses and bowel sounds " normal  MS: Unsteady gait, using cane  Neuro- no nystagmus          Signed Electronically by: Mike Strong MD

## 2025-07-21 ENCOUNTER — PATIENT OUTREACH (OUTPATIENT)
Dept: CARE COORDINATION | Facility: CLINIC | Age: 80
End: 2025-07-21
Payer: COMMERCIAL

## 2025-07-23 ENCOUNTER — PATIENT OUTREACH (OUTPATIENT)
Dept: CARE COORDINATION | Facility: CLINIC | Age: 80
End: 2025-07-23
Payer: COMMERCIAL

## 2025-07-25 PROBLEM — R42 VERTIGO: Status: ACTIVE | Noted: 2025-07-25

## 2025-07-25 PROBLEM — H35.30 MACULAR DEGENERATION (SENILE) OF RETINA: Status: ACTIVE | Noted: 2025-07-25

## 2025-07-25 PROBLEM — R26.89 IMBALANCE: Status: ACTIVE | Noted: 2025-07-25
